# Patient Record
Sex: MALE | Race: WHITE | NOT HISPANIC OR LATINO | Employment: OTHER | URBAN - METROPOLITAN AREA
[De-identification: names, ages, dates, MRNs, and addresses within clinical notes are randomized per-mention and may not be internally consistent; named-entity substitution may affect disease eponyms.]

---

## 2018-05-04 ENCOUNTER — HOSPITAL ENCOUNTER (EMERGENCY)
Facility: CLINIC | Age: 73
Discharge: HOME | End: 2018-05-04
Attending: EMERGENCY MEDICINE | Admitting: EMERGENCY MEDICINE
Payer: MEDICARE

## 2018-05-04 VITALS
RESPIRATION RATE: 20 BRPM | TEMPERATURE: 97.7 F | BODY MASS INDEX: 24.38 KG/M2 | HEIGHT: 72 IN | OXYGEN SATURATION: 91 % | WEIGHT: 180 LBS | HEART RATE: 73 BPM | SYSTOLIC BLOOD PRESSURE: 100 MMHG | DIASTOLIC BLOOD PRESSURE: 57 MMHG

## 2018-05-04 DIAGNOSIS — R33.9 URINARY RETENTION: ICD-10-CM

## 2018-05-04 LAB
ALBUMIN UR-MCNC: NEGATIVE MG/DL
APPEARANCE UR: CLEAR
BILIRUB UR QL STRIP: NEGATIVE
COLOR UR AUTO: YELLOW
GLUCOSE UR STRIP-MCNC: NEGATIVE MG/DL
HGB UR QL STRIP: ABNORMAL
KETONES UR STRIP-MCNC: NEGATIVE MG/DL
LEUKOCYTE ESTERASE UR QL STRIP: NEGATIVE
MUCOUS THREADS #/AREA URNS LPF: PRESENT /LPF
NITRATE UR QL: NEGATIVE
PH UR STRIP: 6 PH (ref 5–7)
RBC #/AREA URNS AUTO: 52 /HPF (ref 0–2)
SOURCE: ABNORMAL
SP GR UR STRIP: 1.01 (ref 1–1.03)
SQUAMOUS #/AREA URNS AUTO: <1 /HPF (ref 0–1)
UROBILINOGEN UR STRIP-MCNC: 0 MG/DL (ref 0–2)
WBC #/AREA URNS AUTO: 3 /HPF (ref 0–5)

## 2018-05-04 PROCEDURE — 99284 EMERGENCY DEPT VISIT MOD MDM: CPT | Mod: 25

## 2018-05-04 PROCEDURE — 51702 INSERT TEMP BLADDER CATH: CPT

## 2018-05-04 PROCEDURE — 51798 US URINE CAPACITY MEASURE: CPT

## 2018-05-04 PROCEDURE — 81001 URINALYSIS AUTO W/SCOPE: CPT | Performed by: EMERGENCY MEDICINE

## 2018-05-04 RX ORDER — TAMSULOSIN HYDROCHLORIDE 0.4 MG/1
CAPSULE ORAL DAILY
COMMUNITY
End: 2018-07-30

## 2018-05-04 RX ORDER — MULTIVIT WITH MINERALS/LUTEIN
1 TABLET ORAL DAILY
COMMUNITY

## 2018-05-04 ASSESSMENT — ENCOUNTER SYMPTOMS
ABDOMINAL PAIN: 1
ABDOMINAL DISTENTION: 1
VOMITING: 1
DIFFICULTY URINATING: 1
DIARRHEA: 0

## 2018-05-04 NOTE — ED PROVIDER NOTES
History     Chief Complaint:  Urinary retention    HPI   Mahad Ward is a 72 year old male with a history of benign prostatic hypertrophy, taking tamsulosin, who presents with urinary retention. The patient reports that he began noticing some difficulty urinating two days ago despite drinking a large amount of fluids. Yesterday he notes that he was able to pass a few drops of urine, though he feels very distended. Yesterday he began having some severe abdominal cramping and then vomited. This vomiting did relieve his abdominal pain. He has continued to be unable to urinate. He denies having had any diarrhea.     Allergies:  Levaquin    Medications:    Combivent respimat  Dulera  Tamsulosin     Past Medical History:    BPH  COPD    Past Surgical History:    History reviewed. No pertinent past surgical history.     Family History:    The patient denies any relevant family medical history.     Social History:  The patient was accompanied to the ED by his wife.  Marital Status:   [2]     Review of Systems   Gastrointestinal: Positive for abdominal distention, abdominal pain and vomiting. Negative for diarrhea.   Genitourinary: Positive for difficulty urinating.   All other systems reviewed and are negative.    Physical Exam   Vitals:  Patient Vitals for the past 24 hrs:   BP Temp Temp src Pulse Heart Rate Resp SpO2 Height Weight   05/04/18 1222 100/57 97.7  F (36.5  C) Temporal 73 73 20 91 % 1.829 m (6') 81.6 kg (180 lb)        Physical Exam  Vital signs and nursing notes reviewed.     Constitutional: laying on gurney appears mildly uncomfortable  HENT: Oropharynx is clear and moist  Eyes: Conjunctivae are normal bilaterally. Pupils equal  Neck: normal range of motion  Cardiovascular: Normal rate, regular rhythm, normal heart sounds.   Pulmonary/Chest: Effort normal and breath sounds normal. No respiratory distress.   Abdominal: Soft. Bowel sounds are normal. Some suprapubic abdominal tenderness. Upper  abdomen is soft and non-distended. No flank tenderness.. No rebound or guarding.   Musculoskeletal: No joint swelling or edema.   Neurological: Alert and oriented. No focal weakness  Skin: Skin is warm and dry. No rash noted.   Psych: normal affect     Emergency Department Course     Laboratory:  Laboratory findings were communicated with the patient who voiced understanding of the findings.  UA: Blood: Moderate, RBC: 52(H), Mucous: present    Emergency Department Course:  Nursing notes and vitals reviewed.  I performed an exam of the patient as documented above.     1512 I rechecked with the patient    Findings and plan explained to the Patient. Patient discharged home with instructions regarding supportive care, medications, and reasons to return. The importance of close follow-up was reviewed.      I personally reviewed the laboratory results with the patient and answered all related questions prior to discharge.    Impression & Plan      Medical Decision Making:  Mahad Ward is a 72 year old male who presents to the emergency department today with urinary retention. The patient said his symptoms started approximately 36 hours ago.  On arrival he was not able to empty his bladder and had over 1000 cc's noted on bladder scan.  Gonzalez catheter was placed and the patient had significant improvment in symptomology. On recheck he was sleeping and resting comfortably.  He has no concerning flank pain or abdominal pain on examination. He has a history of prostatic hypertrophy which does put him at risk of urinary retention, but has no other concerning history that would suggest a cause for his urinary retention. His urinalysis does not show any evidence of a urinary tract infection. He does have a Urologist that he has seen in the past. I advised him to call the clinic for follow up in three days to have the Gonzalez catheter removed, or if he cant see his Urologist he can follow upright his primary physician. There is  no clear indication that he needs lab tests or imaging studies at this time. I felt he was safe for discharge home. Patient understands the plan and was discharged home.    Diagnosis:    ICD-10-CM    1. Urinary retention R33.9       Disposition:   Discharged    CMS Diagnoses: None     Scribe Disclosure:  I, Rodrigo RousseauEdwige, am serving as a scribe at 1:24 PM on 5/4/2018 to document services personally performed by Prakash Boo MD, based on my observations and the provider's statements to me.   [facility] EMERGENCY DEPARTMENT       Prakash Boo MD  05/04/18 8005

## 2018-05-04 NOTE — ED AVS SNAPSHOT
[facility] Emergency Department    [address]    [facility] [address]    Phone:  [phone]    Fax:  [phone]                                       Mahad Ward   MRN: [id number]    Department:  [facility] Emergency Department   Date of Visit:  5/4/2018           After Visit Summary Signature Page     I have received my discharge instructions, and my questions have been answered. I have discussed any challenges I see with this plan with the nurse or doctor.    ..........................................................................................................................................  Patient/Patient Representative Signature      ..........................................................................................................................................  Patient Representative Print Name and Relationship to Patient    ..................................................               ................................................  Date                                            Time    ..........................................................................................................................................  Reviewed by Signature/Title    ...................................................              ..............................................  Date                                                            Time

## 2018-05-04 NOTE — ED AVS SNAPSHOT
[facility] Emergency Department    [address]    [facility] [address]    Phone:  [phone]    Fax:  [phone]                                       Mahad Ward   MRN: [id number]    Department:  [facility] Emergency Department   Date of Visit:  5/4/2018           Patient Information     Date Of Birth          1945        Your diagnoses for this visit were:     Urinary retention        You were seen by Prakash Boo MD.      Follow-up Information     Follow up with Your Urologyist. Call today.    Why:  for appointment in 3 days, to remove barba catheter        Follow up with [facility] Emergency Department.    Specialty:  EMERGENCY MEDICINE    Why:  if severe pain, fever, uncontrolled vomiting    Contact information:    [address]  [facility] [id number] [phone]        Discharge Instructions           Barba Catheter Care    A Barba catheter is a rubber tube that is placed through the urethra (opening where urine comes out) and into the bladder. This helps drain urine from the bladder. There is a small balloon on the end of the tube that is inflated after insertion. This keeps the catheter from sliding out of the bladder.  A Barba catheter is used to treat urinary retention (unable to pass urine). It is also used when there is incontinence (loss of bladder control).  Home care    Finish taking any prescribed antibiotic even if you are feeling better before then.    It is important to keep bacteria from getting into the collection bag. Do not disconnect the catheter from the collection bag.    Use a leg band to secure the drainage tube, so it does not pull on the catheter. Drain the collection bag when it becomes full using the drain spout at the bottom of the bag.    Do not try to pull or remove your catheter. This will injure your urethra. It must be removed by your healthcare provider or nurse.  Follow-up care  Follow  up with your healthcare provider as advised for repeat urine testing and catheter removal or replacement.  When to seek medical advice  Call your healthcare provider right away if any of these occur:    Fever of 100.4 F (38 C) or higher, or as directed by your healthcare provider    Bladder pain or fullness    Abdominal swelling, nausea or vomiting, or back pain    Blood or urine leakage around the catheter    Bloody urine coming from the catheter (if a new symptom)    Catheter falls out    Catheter stops draining for 6 hours    Weakness, dizziness, or fainting  Date Last Reviewed: 10/1/2016    4965-3116 [employer]. [address] [place], [place] [id number]. All rights reserved. This information is not intended as a substitute for professional medical care. Always follow your healthcare professional's instructions.        Urinary Retention (Male)  Urinary retention is the medical term for difficulty or inability to pass urine, even though your bladder is full.  Causes  The most common cause of urinary retention in men is the bladder outlet being blocked. This can be due to an enlarged prostate gland or a bladder infection. Certain medicines can also cause this problem. This condition is more likely to occur as men get older.    Symptoms  Common symptoms of urinary retention include:    Pain (not experienced by everyone)    Frequent urination    Feeling that the bladder is still full after urinating    Incontinence (not being able to control the release of urine)    Swollen abdomen  Treatment  This condition is treated by inserting a tube (catheter) into the bladder to drain the urine. This provides immediate relief. The catheter may need to stay in place for a few days. The catheter has a balloon on the tip, which is inflated after insertion. This prevents the catheter from falling out.  Home care    If you were given antibiotics, take them until they are used up, or your healthcare provider tells you  to stop. It is important to finish the antibiotics even though you feel better. This is to make sure your infection has cleared.    If a catheter was left in place, it is important to keep bacteria from getting into the collection bag. Don't disconnect the catheter from the collection bag.    Use a leg band to secure the drainage tube, so it does not pull on the catheter. Drain the collection bag when it becomes full using the drain spout at the bottom of the bag.    Don't pull on or try to remove your catheter. This will injure your urethra. The catheter must be removed by a healthcare provider.  Follow-up care  Follow up with your healthcare provider, or as advised.  If a catheter was left in place, it can usually be removed within 3 to 7 days. Some conditions require the catheter to stay in longer. Your healthcare provider will tell you when to return to have the catheter removed.  When to seek medical advice  Call your healthcare provider right away if any of these occur:    Fever of 100.4 F (38 C) or higher, or as directed by your healthcare provider    Bladder or lower-abdominal pain or fullness    Abdominal swelling, nausea, vomiting, or back pain    Blood or urine leakage around the catheter    Bloody urine coming from the catheter (if a new symptom)    Weakness, dizziness, or fainting    Confusion or change in usual level of alertness    If a catheter was left in place, return if:  ? Catheter falls out  ? Catheter stops draining for 6 hours  Date Last Reviewed: 10/1/2017    6482-5499 [employer] [facility]. [address] [place] [address]. All rights reserved. This information is not intended as a substitute for professional medical care. Always follow your healthcare professional's instructions.          24 Hour Appointment Hotline       To make an appointment at any [facility], call [phone] ([phone]). If you don't have a family doctor or clinic, we will help you find one.  [facility] are conveniently located to serve the needs of you and your family.             Review of your medicines      Our records show that you are taking the medicines listed below. If these are incorrect, please call your family doctor or clinic.        Dose / Directions Last dose taken    ALEVE PO        Refills:  0        CENTRUM SILVER per tablet   Dose:  1 tablet        Take 1 tablet by mouth daily   Refills:  0        COMBIVENT RESPIMAT  MCG/ACT inhaler   Dose:  1 puff   Generic drug:  Ipratropium-Albuterol        Inhale 1 puff into the lungs 4 times daily   Refills:  0        FLOMAX 0.4 MG capsule   Generic drug:  tamsulosin        Take by mouth daily   Refills:  0        mometasone-formoterol 200-5 MCG/ACT oral inhaler   Commonly known as:  DULERA   Dose:  2 puff        Inhale 2 puffs into the lungs 2 times daily   Refills:  0        VITAMIN D (CHOLECALCIFEROL) PO        Take by mouth daily   Refills:  0                Procedures and tests performed during your visit     UA with Microscopic      Orders Needing Specimen Collection     None      Pending Results     No orders found from 5/2/2018 to 5/5/2018.            Pending Culture Results     No orders found from 5/2/2018 to 5/5/2018.            Pending Results Instructions     If you had any lab results that were not finalized at the time of your Discharge, you can call the ED Lab Result RN at [phone]. You will be contacted by this team for any positive Lab results or changes in treatment. The nurses are available 7 days a week from 10A to 6:30P.  You can leave a message 24 hours per day and they will return your call.        Test Results From Your Hospital Stay        5/4/2018  3:07 PM      Component Results     Component Value Ref Range & Units Status    Color Urine Yellow  Final    Appearance Urine Clear  Final    Glucose Urine Negative NEG^Negative mg/dL Final    Bilirubin Urine Negative NEG^Negative Final    Ketones Urine Negative  NEG^Negative mg/dL Final    Specific Gravity Urine 1.014 1.003 - 1.035 Final    Blood Urine Moderate (A) NEG^Negative Final    pH Urine 6.0 5.0 - 7.0 pH Final    Protein Albumin Urine Negative NEG^Negative mg/dL Final    Urobilinogen mg/dL 0.0 0.0 - 2.0 mg/dL Final    Nitrite Urine Negative NEG^Negative Final    Leukocyte Esterase Urine Negative NEG^Negative Final    Source Catheterized Urine  Final    WBC Urine 3 0 - 5 /HPF Final    RBC Urine 52 (H) 0 - 2 /HPF Final    Squamous Epithelial /HPF Urine <1 0 - 1 /HPF Final    Mucous Urine Present (A) NEG^Negative /LPF Final                Clinical Quality Measure: Blood Pressure Screening     Your blood pressure was checked while you were in the emergency department today. The last reading we obtained was  BP: 100/57 . Please read the guidelines below about what these numbers mean and what you should do about them.  If your systolic blood pressure (the top number) is less than 120 and your diastolic blood pressure (the bottom number) is less than 80, then your blood pressure is normal. There is nothing more that you need to do about it.  If your systolic blood pressure (the top number) is 120-139 or your diastolic blood pressure (the bottom number) is 80-89, your blood pressure may be higher than it should be. You should have your blood pressure rechecked within a year by a primary care provider.  If your systolic blood pressure (the top number) is 140 or greater or your diastolic blood pressure (the bottom number) is 90 or greater, you may have high blood pressure. High blood pressure is treatable, but if left untreated over time it can put you at risk for heart attack, stroke, or kidney failure. You should have your blood pressure rechecked by a primary care provider within the next 4 weeks.  If your provider in the emergency department today gave you specific instructions to follow-up with your doctor or provider even sooner than that, you should follow that  "instruction and not wait for up to 4 weeks for your follow-up visit.        Thank you for choosing [facility]       Thank you for choosing [facility] for your care. Our goal is always to provide you with excellent care. Hearing back from our patients is one way we can continue to improve our services. Please take a few minutes to complete the written survey that you may receive in the mail after you visit with us. Thank you!        [employer] Information     [employer] lets you send messages to your doctor, view your test results, renew your prescriptions, schedule appointments and more. To sign up, go to [email or web address] . Click on \"Log in\" on the left side of the screen, which will take you to the Welcome page. Then click on \"Sign up Now\" on the right side of the page.     You will be asked to enter the access code listed below, as well as some personal information. Please follow the directions to create your username and password.     Your access code is: [email or web address]  Expires: 2018  3:24 PM     Your access code will  in 90 days. If you need help or a new code, please call your [facility] clinic or [phone].        Care EveryWhere ID     This is your Care EveryWhere ID. This could be used by other organizations to access your [facility] medical records  [id number]        Equal Access to Services     KAYLEEN FLORES : Hadii lesia Bruno, waaxda luqadaha, qaybta kaalmada aiyana, amanda robbins . So [facility] [phone].    ATENCIÓN: Si habla español, tiene a herbert disposición servicios gratuitos de asistencia lingüística. Llame al [phone].    We comply with applicable federal civil rights laws and [place] laws. We do not discriminate on the basis of race, color, national origin, age, disability, sex, sexual orientation, or gender identity.            After Visit Summary       This is your record. Keep this with you and show to your community pharmacist(s) and doctor(s) " at your next visit.

## 2018-05-04 NOTE — DISCHARGE INSTRUCTIONS
Gonzalez Catheter Care    A Gonzalez catheter is a rubber tube that is placed through the urethra (opening where urine comes out) and into the bladder. This helps drain urine from the bladder. There is a small balloon on the end of the tube that is inflated after insertion. This keeps the catheter from sliding out of the bladder.  A Gonzalez catheter is used to treat urinary retention (unable to pass urine). It is also used when there is incontinence (loss of bladder control).  Home care    Finish taking any prescribed antibiotic even if you are feeling better before then.    It is important to keep bacteria from getting into the collection bag. Do not disconnect the catheter from the collection bag.    Use a leg band to secure the drainage tube, so it does not pull on the catheter. Drain the collection bag when it becomes full using the drain spout at the bottom of the bag.    Do not try to pull or remove your catheter. This will injure your urethra. It must be removed by your healthcare provider or nurse.  Follow-up care  Follow up with your healthcare provider as advised for repeat urine testing and catheter removal or replacement.  When to seek medical advice  Call your healthcare provider right away if any of these occur:    Fever of 100.4 F (38 C) or higher, or as directed by your healthcare provider    Bladder pain or fullness    Abdominal swelling, nausea or vomiting, or back pain    Blood or urine leakage around the catheter    Bloody urine coming from the catheter (if a new symptom)    Catheter falls out    Catheter stops draining for 6 hours    Weakness, dizziness, or fainting  Date Last Reviewed: 10/1/2016    8007-6047 [employer]. [address] [place] [address], [place] [address]. All rights reserved. This information is not intended as a substitute for professional medical care. Always follow your healthcare professional's instructions.        Urinary Retention (Male)  Urinary retention is the medical  term for difficulty or inability to pass urine, even though your bladder is full.  Causes  The most common cause of urinary retention in men is the bladder outlet being blocked. This can be due to an enlarged prostate gland or a bladder infection. Certain medicines can also cause this problem. This condition is more likely to occur as men get older.    Symptoms  Common symptoms of urinary retention include:    Pain (not experienced by everyone)    Frequent urination    Feeling that the bladder is still full after urinating    Incontinence (not being able to control the release of urine)    Swollen abdomen  Treatment  This condition is treated by inserting a tube (catheter) into the bladder to drain the urine. This provides immediate relief. The catheter may need to stay in place for a few days. The catheter has a balloon on the tip, which is inflated after insertion. This prevents the catheter from falling out.  Home care    If you were given antibiotics, take them until they are used up, or your healthcare provider tells you to stop. It is important to finish the antibiotics even though you feel better. This is to make sure your infection has cleared.    If a catheter was left in place, it is important to keep bacteria from getting into the collection bag. Don't disconnect the catheter from the collection bag.    Use a leg band to secure the drainage tube, so it does not pull on the catheter. Drain the collection bag when it becomes full using the drain spout at the bottom of the bag.    Don't pull on or try to remove your catheter. This will injure your urethra. The catheter must be removed by a healthcare provider.  Follow-up care  Follow up with your healthcare provider, or as advised.  If a catheter was left in place, it can usually be removed within 3 to 7 days. Some conditions require the catheter to stay in longer. Your healthcare provider will tell you when to return to have the catheter removed.  When to  seek medical advice  Call your healthcare provider right away if any of these occur:    Fever of 100.4 F (38 C) or higher, or as directed by your healthcare provider    Bladder or lower-abdominal pain or fullness    Abdominal swelling, nausea, vomiting, or back pain    Blood or urine leakage around the catheter    Bloody urine coming from the catheter (if a new symptom)    Weakness, dizziness, or fainting    Confusion or change in usual level of alertness    If a catheter was left in place, return if:  ? Catheter falls out  ? Catheter stops draining for 6 hours  Date Last Reviewed: 10/1/2017    4273-6080 [employer]. [address] [place], [place] [id number]. All rights reserved. This information is not intended as a substitute for professional medical care. Always follow your healthcare professional's instructions.

## 2018-05-04 NOTE — ED TRIAGE NOTES
Reports unable to urinate more than a dribble at a time since Wednesday night; states hx of BPH.  Reports he is having spasms and cramping to suprapubic area that have been bad enough to make him vomit twice. ABCs intact.

## 2018-05-07 ENCOUNTER — HOSPITAL ENCOUNTER (EMERGENCY)
Facility: CLINIC | Age: 73
Discharge: HOME | End: 2018-05-08
Attending: EMERGENCY MEDICINE | Admitting: EMERGENCY MEDICINE
Payer: MEDICARE

## 2018-05-07 VITALS
TEMPERATURE: 97.7 F | HEART RATE: 76 BPM | DIASTOLIC BLOOD PRESSURE: 78 MMHG | SYSTOLIC BLOOD PRESSURE: 137 MMHG | OXYGEN SATURATION: 93 % | RESPIRATION RATE: 18 BRPM

## 2018-05-07 DIAGNOSIS — R33.9 URINARY RETENTION: ICD-10-CM

## 2018-05-07 PROCEDURE — 51798 US URINE CAPACITY MEASURE: CPT

## 2018-05-07 PROCEDURE — 51702 INSERT TEMP BLADDER CATH: CPT

## 2018-05-07 PROCEDURE — 81001 URINALYSIS AUTO W/SCOPE: CPT | Performed by: EMERGENCY MEDICINE

## 2018-05-07 PROCEDURE — 99284 EMERGENCY DEPT VISIT MOD MDM: CPT | Mod: 25

## 2018-05-07 ASSESSMENT — ENCOUNTER SYMPTOMS
BACK PAIN: 0
FEVER: 0
DIFFICULTY URINATING: 1
ABDOMINAL PAIN: 0

## 2018-05-07 NOTE — ED AVS SNAPSHOT
[facility] Emergency Department    [address]    [facility] [address]    Phone:  [phone]    Fax:  [phone]                                       Mahad Ward   MRN: [id number]    Department:  [facility] Emergency Department   Date of Visit:  5/7/2018           After Visit Summary Signature Page     I have received my discharge instructions, and my questions have been answered. I have discussed any challenges I see with this plan with the nurse or doctor.    ..........................................................................................................................................  Patient/Patient Representative Signature      ..........................................................................................................................................  Patient Representative Print Name and Relationship to Patient    ..................................................               ................................................  Date                                            Time    ..........................................................................................................................................  Reviewed by Signature/Title    ...................................................              ..............................................  Date                                                            Time

## 2018-05-07 NOTE — ED AVS SNAPSHOT
[facility] Emergency Department    [address]    [facility] [address]    Phone:  [phone]    Fax:  [phone]                                       Mahad Ward   MRN: [id number]    Department:  [facility] Emergency Department   Date of Visit:  5/7/2018           Patient Information     Date Of Birth          1945        Your diagnoses for this visit were:     None       You were seen by Ramesh Almonte MD.      Discharge References/Attachments     [facility]OPPER CATHETER, CARE (ENGLISH)      24 Hour Appointment Hotline       To make an appointment at any [facility], call [phone] ([phone]). If you don't have a family doctor or clinic, we will help you find one. [facility] clinics are conveniently located to serve the needs of you and your family.             Review of your medicines      Our records show that you are taking the medicines listed below. If these are incorrect, please call your family doctor or clinic.        Dose / Directions Last dose taken    ALEVE PO        Refills:  0        CENTRUM SILVER per tablet   Dose:  1 tablet        Take 1 tablet by mouth daily   Refills:  0        COMBIVENT RESPIMAT  MCG/ACT inhaler   Dose:  1 puff   Generic drug:  Ipratropium-Albuterol        Inhale 1 puff into the lungs 4 times daily   Refills:  0        FLOMAX 0.4 MG capsule   Generic drug:  tamsulosin        Take by mouth daily   Refills:  0        mometasone-formoterol 200-5 MCG/ACT oral inhaler   Commonly known as:  DULERA   Dose:  2 puff        Inhale 2 puffs into the lungs 2 times daily   Refills:  0        VITAMIN D (CHOLECALCIFEROL) PO        Take by mouth daily   Refills:  0                Procedures and tests performed during your visit     UA with Microscopic      Orders Needing Specimen Collection     None      Pending Results     No orders found for last 3 day(s).            Pending Culture Results     No orders found for last 3  day(s).            Pending Results Instructions     If you had any lab results that were not finalized at the time of your Discharge, you can call the ED Lab Result RN at [phone]. You will be contacted by this team for any positive Lab results or changes in treatment. The nurses are available 7 days a week from 10A to 6:30P.  You can leave a message 24 hours per day and they will return your call.        Test Results From Your Hospital Stay        5/8/2018 12:14 AM      Component Results     Component Value Ref Range & Units Status    Color Urine Yellow  Final    Appearance Urine Clear  Final    Glucose Urine Negative NEG^Negative mg/dL Final    Bilirubin Urine Negative NEG^Negative Final    Ketones Urine Negative NEG^Negative mg/dL Final    Specific Gravity Urine 1.015 1.003 - 1.035 Final    Blood Urine Moderate (A) NEG^Negative Final    pH Urine 6.0 5.0 - 7.0 pH Final    Protein Albumin Urine 30 (A) NEG^Negative mg/dL Final    Urobilinogen mg/dL 2.0 0.0 - 2.0 mg/dL Final    Nitrite Urine Negative NEG^Negative Final    Leukocyte Esterase Urine Trace (A) NEG^Negative Final    Source Catheterized Urine  Final    WBC Urine 5 0 - 5 /HPF Final    RBC Urine 53 (H) 0 - 2 /HPF Final    Mucous Urine Present (A) NEG^Negative /LPF Final                Clinical Quality Measure: Blood Pressure Screening     Your blood pressure was checked while you were in the emergency department today. The last reading we obtained was  BP: 137/78 . Please read the guidelines below about what these numbers mean and what you should do about them.  If your systolic blood pressure (the top number) is less than 120 and your diastolic blood pressure (the bottom number) is less than 80, then your blood pressure is normal. There is nothing more that you need to do about it.  If your systolic blood pressure (the top number) is 120-139 or your diastolic blood pressure (the bottom number) is 80-89, your blood pressure may be higher than it should  "be. You should have your blood pressure rechecked within a year by a primary care provider.  If your systolic blood pressure (the top number) is 140 or greater or your diastolic blood pressure (the bottom number) is 90 or greater, you may have high blood pressure. High blood pressure is treatable, but if left untreated over time it can put you at risk for heart attack, stroke, or kidney failure. You should have your blood pressure rechecked by a primary care provider within the next 4 weeks.  If your provider in the emergency department today gave you specific instructions to follow-up with your doctor or provider even sooner than that, you should follow that instruction and not wait for up to 4 weeks for your follow-up visit.        Thank you for choosing [facility]       Thank you for choosing [facility] for your care. Our goal is always to provide you with excellent care. Hearing back from our patients is one way we can continue to improve our services. Please take a few minutes to complete the written survey that you may receive in the mail after you visit with us. Thank you!        [facility] Information     [facility] lets you send messages to your doctor, view your test results, renew your prescriptions, schedule appointments and more. To sign up, go to [email or web address] . Click on \"Log in\" on the left side of the screen, which will take you to the Welcome page. Then click on \"Sign up Now\" on the right side of the page.     You will be asked to enter the access code listed below, as well as some personal information. Please follow the directions to create your username and password.     Your access code is: [email or web address]  Expires: 2018  3:24 PM     Your access code will  in 90 days. If you need help or a new code, please call your [facility] clinic or [phone].        Care EveryWhere ID     This is your Care EveryWhere ID. This could be used by other organizations to access your [facility] medical " records  [id number]        Equal Access to Services     KAYLEEN FLORES : Juan Bruno, sarah ulrich, amanda aldrich. So [facility] [phone].    ATENCIÓN: Si habla español, tiene a herbert disposición servicios gratuitos de asistencia lingüística. Llame al [phone].    We comply with applicable federal civil rights laws and [place] laws. We do not discriminate on the basis of race, color, national origin, age, disability, sex, sexual orientation, or gender identity.            After Visit Summary       This is your record. Keep this with you and show to your community pharmacist(s) and doctor(s) at your next visit.

## 2018-05-08 LAB
ALBUMIN UR-MCNC: 30 MG/DL
APPEARANCE UR: CLEAR
BILIRUB UR QL STRIP: NEGATIVE
COLOR UR AUTO: YELLOW
GLUCOSE UR STRIP-MCNC: NEGATIVE MG/DL
HGB UR QL STRIP: ABNORMAL
KETONES UR STRIP-MCNC: NEGATIVE MG/DL
LEUKOCYTE ESTERASE UR QL STRIP: ABNORMAL
MUCOUS THREADS #/AREA URNS LPF: PRESENT /LPF
NITRATE UR QL: NEGATIVE
PH UR STRIP: 6 PH (ref 5–7)
RBC #/AREA URNS AUTO: 53 /HPF (ref 0–2)
SOURCE: ABNORMAL
SP GR UR STRIP: 1.01 (ref 1–1.03)
UROBILINOGEN UR STRIP-MCNC: 2 MG/DL (ref 0–2)
WBC #/AREA URNS AUTO: 5 /HPF (ref 0–5)

## 2018-05-08 NOTE — ED TRIAGE NOTES
Just had cath out today at 3 pm and has not peed since then. Seen recently here and had cath in. Not waiting as long this time.    Pt A&O x 3, CMS x 3, ABCD's adequate in triage

## 2018-05-08 NOTE — DISCHARGE INSTRUCTIONS
Urinary Retention (Male)  Urinary retention is the medical term for difficulty or inability to pass urine, even though your bladder is full.  Causes  The most common cause of urinary retention in men is the bladder outlet being blocked. This can be due to an enlarged prostate gland or a bladder infection. Certain medicines can also cause this problem. This condition is more likely to occur as men get older.    Symptoms  Common symptoms of urinary retention include:    Pain (not experienced by everyone)    Frequent urination    Feeling that the bladder is still full after urinating    Incontinence (not being able to control the release of urine)    Swollen abdomen  Treatment  This condition is treated by inserting a tube (catheter) into the bladder to drain the urine. This provides immediate relief. The catheter may need to stay in place for a few days. The catheter has a balloon on the tip, which is inflated after insertion. This prevents the catheter from falling out.  Home care    If you were given antibiotics, take them until they are used up, or your healthcare provider tells you to stop. It is important to finish the antibiotics even though you feel better. This is to make sure your infection has cleared.    If a catheter was left in place, it is important to keep bacteria from getting into the collection bag. Don't disconnect the catheter from the collection bag.    Use a leg band to secure the drainage tube, so it does not pull on the catheter. Drain the collection bag when it becomes full using the drain spout at the bottom of the bag.    Don't pull on or try to remove your catheter. This will injure your urethra. The catheter must be removed by a healthcare provider.  Follow-up care  Follow up with your healthcare provider, or as advised.  If a catheter was left in place, it can usually be removed within 3 to 7 days. Some conditions require the catheter to stay in longer. Your healthcare provider will  tell you when to return to have the catheter removed.  When to seek medical advice  Call your healthcare provider right away if any of these occur:    Fever of 100.4 F (38 C) or higher, or as directed by your healthcare provider    Bladder or lower-abdominal pain or fullness    Abdominal swelling, nausea, vomiting, or back pain    Blood or urine leakage around the catheter    Bloody urine coming from the catheter (if a new symptom)    Weakness, dizziness, or fainting    Confusion or change in usual level of alertness    If a catheter was left in place, return if:  ? Catheter falls out  ? Catheter stops draining for 6 hours  Date Last Reviewed: 10/1/2017    5914-0055 [employer]. [address] [place] [address], [place] [address]. All rights reserved. This information is not intended as a substitute for professional medical care. Always follow your healthcare professional's instructions.

## 2018-05-08 NOTE — ED PROVIDER NOTES
History     Chief Complaint:  Urinary retention    HPI   Mahad Ward is a 72 year old male who presents with urinary retention.  The patient says that he was seen in the ED on 5/4/18 for urinary rentention, and at this time a barba catheter was placed.  Today, the barba was removed at the patient's clinic but was told to return to the emergency department if urinary retention continues.  The patient says that after clinic today, his urinary retention did continue and he presents to the ED for further evaluation.  Here in the ED, the patient denies any abdominal pressure, abdominal pain, fever, or back pain.  Of note, the patient takes Flomax.  He has no back pain, leg pain, or other concerns.     Allergies:  Levaquin     Medications:    Combivent respimat  Dulera  Tamsulosin      Past Medical History:    BPH  COPD     Past Surgical History:    History reviewed. No pertinent past surgical history.      Family History:    The patient denies any relevant family medical history.      Social History:  The patient presents alone.  Marital Status:   [2]     Review of Systems   Constitutional: Negative for fever.   Gastrointestinal: Negative for abdominal pain.   Genitourinary: Positive for difficulty urinating.   Musculoskeletal: Negative for back pain.   All other systems reviewed and are negative.      Physical Exam     Patient Vitals for the past 24 hrs:   BP Temp Temp src Pulse Resp SpO2   05/07/18 2310 137/78 97.7  F (36.5  C) Temporal 76 18 93 %       Physical Exam  General:                        Well-nourished                        Speaking in full sentences  Eyes:                        Conjunctiva without injection or scleral icterus  ENT:                        Moist mucous membranes                        Nares patent                        Pinnae normal  Neck:                        Full ROM                        No stiffness appreciated  Resp:                        Lungs CTAB                         No crackles, wheezing or audible rubs                        Good air movement  CV:                                        Normal rate, regular rhythm                        S1 and S2 present                        No murmur, gallop or rub  GI:                        BS present                        Abdomen soft without distention                        Non-tender to light and deep palpation                        No palpable pulsatile mass                        No guarding or rebound tenderness  :                        Gonzalez catheter in place  Skin:                        Warm, dry, well perfused                        No rashes or open wounds on exposed skin  MSK:                        Moves all extremities                        No focal deformities or swelling  Neuro:                        Alert                        Answers questions appropriately                        Moves all extremities equally                        Gait stable  Psych:                        Normal affect, normal mood    Emergency Department Course     Laboratory:  UA with micro: Urine blood moderate, protein albumin urine 30, leukocyte esterase urine trace, RBCs/hpf 3 high, mucus urine present o/w negative    Emergency Department Course:  Nursing notes and vitals reviewed. 2331 I performed an exam of the patient as documented above.     The patient provided a urine sample here in the emergency department. This was sent for laboratory testing, findings above.     Findings and plan explained to the Patient. Patient discharged home with instructions regarding supportive care, medications, and reasons to return. The importance of close follow-up was reviewed.     I personally reviewed the laboratory results with the Patient and answered all related questions prior to discharge.     Impression & Plan      Medical Decision Making:  Patient is a 72-year-old male with a history of BPH presenting to the ER for evaluation of urinary  retention.  VS on presentation were mildly elevated BP though otherwise unremarkable.  Patient was seen and evaluated in the ED for a similar complaint 3 days prior with a diagnosis of urinary retention.  Gonzalez catheter was subsequently removed at 1500 today without associated urination since that time.  Symptoms again concerning for urinary retention likely secondary to BPH.  Bladder scanning demonstrates approximately 1 L of retained urine.  After Gonzalez catheter was placed, urine returned without difficulty.  Repeat urinanalysis does not reveal evidence of infection.  He notes no associated abdominal pain nor back pain to suggest pyelonephritis.  I feel that this is unlikely to represent acute cord compressive process or other neurologic emergency.  I recommended remaining with catheter in place until follow-up with urology.  He does have contact information already.  He is welcome to return to the ER with new or troubling symptoms such as recurrent obstruction, fever, vomiting, or any other concerns.  All questions answered prior to discharge.    Diagnosis:    ICD-10-CM    1. Urinary retention R33.9        Disposition:  discharged to home    Iglesia Rojas  5/7/2018   [facility] EMERGENCY DEPARTMENT  I, Iglesia Rojas, am serving as a scribe at 11:31 PM on 5/7/2018 to document services personally performed by Ramesh Almonte MD based on my observations and the provider's statements to me.        Ramesh Almonte MD  05/08/18 0122

## 2018-05-20 ENCOUNTER — HOSPITAL ENCOUNTER (EMERGENCY)
Facility: CLINIC | Age: 73
Discharge: HOME | End: 2018-05-20
Attending: EMERGENCY MEDICINE | Admitting: EMERGENCY MEDICINE
Payer: MEDICARE

## 2018-05-20 VITALS
TEMPERATURE: 97.3 F | RESPIRATION RATE: 18 BRPM | OXYGEN SATURATION: 92 % | SYSTOLIC BLOOD PRESSURE: 116 MMHG | DIASTOLIC BLOOD PRESSURE: 76 MMHG

## 2018-05-20 DIAGNOSIS — R33.9 URINARY RETENTION: ICD-10-CM

## 2018-05-20 LAB
ALBUMIN UR-MCNC: NEGATIVE MG/DL
ANION GAP SERPL CALCULATED.3IONS-SCNC: 4 MMOL/L (ref 3–14)
APPEARANCE UR: ABNORMAL
BACTERIA #/AREA URNS HPF: ABNORMAL /HPF
BILIRUB UR QL STRIP: NEGATIVE
BUN SERPL-MCNC: 17 MG/DL (ref 7–30)
CALCIUM SERPL-MCNC: 8.6 MG/DL (ref 8.5–10.1)
CHLORIDE SERPL-SCNC: 111 MMOL/L (ref 94–109)
CO2 SERPL-SCNC: 26 MMOL/L (ref 20–32)
COLOR UR AUTO: YELLOW
CREAT SERPL-MCNC: 0.94 MG/DL (ref 0.66–1.25)
GFR SERPL CREATININE-BSD FRML MDRD: 79 ML/MIN/1.7M2
GLUCOSE SERPL-MCNC: 107 MG/DL (ref 70–99)
GLUCOSE UR STRIP-MCNC: NEGATIVE MG/DL
HGB UR QL STRIP: ABNORMAL
KETONES UR STRIP-MCNC: NEGATIVE MG/DL
LEUKOCYTE ESTERASE UR QL STRIP: NEGATIVE
MUCOUS THREADS #/AREA URNS LPF: PRESENT /LPF
NITRATE UR QL: NEGATIVE
PH UR STRIP: 7 PH (ref 5–7)
POTASSIUM SERPL-SCNC: 4.1 MMOL/L (ref 3.4–5.3)
RBC #/AREA URNS AUTO: >182 /HPF (ref 0–2)
SODIUM SERPL-SCNC: 141 MMOL/L (ref 133–144)
SOURCE: ABNORMAL
SP GR UR STRIP: 1.01 (ref 1–1.03)
UROBILINOGEN UR STRIP-MCNC: 0 MG/DL (ref 0–2)
WBC #/AREA URNS AUTO: 2 /HPF (ref 0–5)

## 2018-05-20 PROCEDURE — 99283 EMERGENCY DEPT VISIT LOW MDM: CPT

## 2018-05-20 PROCEDURE — 81001 URINALYSIS AUTO W/SCOPE: CPT | Performed by: EMERGENCY MEDICINE

## 2018-05-20 PROCEDURE — 80048 BASIC METABOLIC PNL TOTAL CA: CPT | Performed by: EMERGENCY MEDICINE

## 2018-05-20 ASSESSMENT — ENCOUNTER SYMPTOMS
FEVER: 0
NAUSEA: 0
DIFFICULTY URINATING: 1
HEMATURIA: 1
ABDOMINAL PAIN: 0

## 2018-05-20 NOTE — ED TRIAGE NOTES
Pt has been self cathing for about 4 days.  States that the last time he was able to drain any urine was 1000 Saturday.  States since then he hasn't drained any urine and has only noticed some blood on the catheter.

## 2018-05-20 NOTE — ED NOTES
Did  Bladder scan  Greater than 999 scanned  Pt just learned to self cath   Has been doing since thurs  Has had no trouble since yesterday  Only few drops of blood since  About 10 am    Here for eval

## 2018-05-20 NOTE — ED NOTES
Patient reports that bladder pressure is relieved after catheter placement.  Labs have been drawn and sent.  Patient is resting and awaiting results.

## 2018-05-20 NOTE — ED PROVIDER NOTES
History     Chief Complaint:  Urinary Retention    HPI   Mahad Ward is a 72 year old male who presents with urinary retention. The patient states he has been experiencing issues with urinary retention and output for the past few weeks, he has seen urology for this over this time but no specific cause of this has been found, though there is possible mention of prostate enlargement being the culprit. Patient has been self-cathing himself for the past 4 days, but states he was last able to obtain urine via this method at 1000 yesterday morning. He notes some small drops of hematuria throughout these past 4 days as well. He denies any fevers or any other symptoms.    Allergies:  Levaquin [Levofloxacin]     Medications:    Ipratropium-Albuterol (COMBIVENT RESPIMAT)  MCG/ACT inhaler  mometasone-formoterol (DULERA) 200-5 MCG/ACT oral inhaler  Naproxen Sodium (ALEVE PO)  tamsulosin (FLOMAX) 0.4 MG capsule    Past Medical History:    BPH  COPD    Past Surgical History:    No pertinent past surgical history.    Family History:    No pertinent family history.    Social History:  Smoking status: Never smoker  Alcohol use: No  Marital Status:        Review of Systems   Constitutional: Negative for fever.   Gastrointestinal: Negative for abdominal pain and nausea.   Genitourinary: Positive for difficulty urinating and hematuria. Negative for decreased urine volume, penile pain and penile swelling.   All other systems reviewed and are negative.    Physical Exam     Patient Vitals for the past 24 hrs:   BP Temp Temp src Heart Rate Resp SpO2   05/20/18 0511 116/76 97.3  F (36.3  C) Temporal 87 18 92 %         Physical Exam  General: Patient is alert and interactive when I enter the room  Head:  The scalp, face, and head appear normal  Eyes:  Conjunctivae are normal  ENT:    The nose is normal    Pinnae are normal    External acoustic canals are normal  Neck:  Trachea midline  CV:  Pulses are normal  Resp:  No  respiratory distress   Abdomen:      Soft, non-tender, non-distended  Musc:  Normal muscular tone    No major joint effusions  Skin:  No rash or lesions noted  Neuro:  Speech is normal and fluent. Face is symmetric.     Moving all extremities well.   Psych: Awake. Alert.  Normal affect.  Appropriate interactions.      Emergency Department Course   Laboratory:  BMP: Chloride 111 (H), Glucose 107 (H), otherwise WNL (Creatinine 0.94)    UA: Slightly Cloudy yellow urine, Blood Large, RBC >182 (H), Bacteria Few, Mucous Present, otherwise WNL    Emergency Department Course:  Nursing notes and vitals reviewed.  (0523) I performed an exam of the patient as documented above.    Blood was drawn from the patient. This was sent for laboratory testing, findings above.   Urine sample was obtained and sent for laboratory analysis, findings above.    Findings and plan explained to the patient. Patient discharged home with instructions regarding supportive care, medications, and reasons to return. The importance of close follow-up was reviewed.  Impression & Plan    Medical Decision Making:  Mahad Ward is a 72 year old male who presents for evaluation of abdominal pain and decreased urinary output.  I considered a broad differential including diverticulitis, aneurysm, urinary retention, ureterolithiasis, UTI, pyelonephritis, neurologic causes (MS, cauda equina,etc), colitis, etc.  The history and exam are consistent with acute urinary retention and this is confirmed after barba catheter placement.  A urinalysis was obtained and was negative for infection.  Will send home with catheter and have patient followup with urology in 3-5 days.  The cause of the acute urinary retention is likely his BPH as he is due to have a TURP done. Creatinine normal.Patient is stable for discharge home.      Diagnosis:    ICD-10-CM    1. Urinary retention R33.9        Disposition:  Patient is discharged to home.      Discharge Medications:  New  Prescriptions    No medications on file       I, Nelson Davis, am serving as a scribe on 5/20/2018 at 5:23 AM to personally document services performed by Dr. Mckenzie based on my observations and the provider's statements to me.         Nelson Davis  5/20/2018   [facility] EMERGENCY DEPARTMENT       Rebecca Mckenzie MD  05/20/18 1930

## 2018-05-20 NOTE — ED AVS SNAPSHOT
[facility] Emergency Department    [address]    [facility] [address]    Phone:  [phone]    Fax:  [phone]                                       Mahad Ward   MRN: [id number]    Department:  [facility] Emergency Department   Date of Visit:  5/20/2018           After Visit Summary Signature Page     I have received my discharge instructions, and my questions have been answered. I have discussed any challenges I see with this plan with the nurse or doctor.    ..........................................................................................................................................  Patient/Patient Representative Signature      ..........................................................................................................................................  Patient Representative Print Name and Relationship to Patient    ..................................................               ................................................  Date                                            Time    ..........................................................................................................................................  Reviewed by Signature/Title    ...................................................              ..............................................  Date                                                            Time

## 2018-05-20 NOTE — DISCHARGE INSTRUCTIONS
Urinary Retention (Male)  Urinary retention is the medical term for difficulty or inability to pass urine, even though your bladder is full.  Causes  The most common cause of urinary retention in men is the bladder outlet being blocked. This can be due to an enlarged prostate gland or a bladder infection. Certain medicines can also cause this problem. This condition is more likely to occur as men get older.    Symptoms  Common symptoms of urinary retention include:    Pain (not experienced by everyone)    Frequent urination    Feeling that the bladder is still full after urinating    Incontinence (not being able to control the release of urine)    Swollen abdomen  Treatment  This condition is treated by inserting a tube (catheter) into the bladder to drain the urine. This provides immediate relief. The catheter may need to stay in place for a few days. The catheter has a balloon on the tip, which is inflated after insertion. This prevents the catheter from falling out.  Home care    If you were given antibiotics, take them until they are used up, or your healthcare provider tells you to stop. It is important to finish the antibiotics even though you feel better. This is to make sure your infection has cleared.    If a catheter was left in place, it is important to keep bacteria from getting into the collection bag. Don't disconnect the catheter from the collection bag.    Use a leg band to secure the drainage tube, so it does not pull on the catheter. Drain the collection bag when it becomes full using the drain spout at the bottom of the bag.    Don't pull on or try to remove your catheter. This will injure your urethra. The catheter must be removed by a healthcare provider.  Follow-up care  Follow up with your healthcare provider, or as advised.  If a catheter was left in place, it can usually be removed within 3 to 7 days. Some conditions require the catheter to stay in longer. Your healthcare provider will  tell you when to return to have the catheter removed.  When to seek medical advice  Call your healthcare provider right away if any of these occur:    Fever of 100.4 F (38 C) or higher, or as directed by your healthcare provider    Bladder or lower-abdominal pain or fullness    Abdominal swelling, nausea, vomiting, or back pain    Blood or urine leakage around the catheter    Bloody urine coming from the catheter (if a new symptom)    Weakness, dizziness, or fainting    Confusion or change in usual level of alertness    If a catheter was left in place, return if:  ? Catheter falls out  ? Catheter stops draining for 6 hours  Date Last Reviewed: 10/1/2017    0716-3613 [employer]. [address] [place] [address], [place] [address]. All rights reserved. This information is not intended as a substitute for professional medical care. Always follow your healthcare professional's instructions.          Gonzalez Catheter Care    A Gonzalez catheter is a rubber tube that is placed through the urethra (opening where urine comes out) and into the bladder. This helps drain urine from the bladder. There is a small balloon on the end of the tube that is inflated after insertion. This keeps the catheter from sliding out of the bladder.  A Gonzalez catheter is used to treat urinary retention (unable to pass urine). It is also used when there is incontinence (loss of bladder control).  Home care    Finish taking any prescribed antibiotic even if you are feeling better before then.    It is important to keep bacteria from getting into the collection bag. Do not disconnect the catheter from the collection bag.    Use a leg band to secure the drainage tube, so it does not pull on the catheter. Drain the collection bag when it becomes full using the drain spout at the bottom of the bag.    Do not try to pull or remove your catheter. This will injure your urethra. It must be removed by your healthcare provider or nurse.  Follow-up  care  Follow up with your healthcare provider as advised for repeat urine testing and catheter removal or replacement.  When to seek medical advice  Call your healthcare provider right away if any of these occur:    Fever of 100.4 F (38 C) or higher, or as directed by your healthcare provider    Bladder pain or fullness    Abdominal swelling, nausea or vomiting, or back pain    Blood or urine leakage around the catheter    Bloody urine coming from the catheter (if a new symptom)    Catheter falls out    Catheter stops draining for 6 hours    Weakness, dizziness, or fainting  Date Last Reviewed: 10/1/2016    1581-9065 [employer]. [address] [place] [address]. All rights reserved. This information is not intended as a substitute for professional medical care. Always follow your healthcare professional's instructions.

## 2018-05-20 NOTE — ED AVS SNAPSHOT
[facility] Emergency Department    [address] [place] [address]    Phone:  [phone]    Fax:  [phone]                                       Mahad Ward   MRN: [id number]    Department:  [facility] Emergency Department   Date of Visit:  5/20/2018           Patient Information     Date Of Birth          1945        Your diagnoses for this visit were:     Urinary retention        You were seen by Rebecca Mckenzie MD.      Follow-up Information     Follow up with [facility] In 2 days.    Contact information:    [address] [place] [address]  [place] [address]  [phone]          Discharge Instructions         Urinary Retention (Male)  Urinary retention is the medical term for difficulty or inability to pass urine, even though your bladder is full.  Causes  The most common cause of urinary retention in men is the bladder outlet being blocked. This can be due to an enlarged prostate gland or a bladder infection. Certain medicines can also cause this problem. This condition is more likely to occur as men get older.    Symptoms  Common symptoms of urinary retention include:    Pain (not experienced by everyone)    Frequent urination    Feeling that the bladder is still full after urinating    Incontinence (not being able to control the release of urine)    Swollen abdomen  Treatment  This condition is treated by inserting a tube (catheter) into the bladder to drain the urine. This provides immediate relief. The catheter may need to stay in place for a few days. The catheter has a balloon on the tip, which is inflated after insertion. This prevents the catheter from falling out.  Home care    If you were given antibiotics, take them until they are used up, or your healthcare provider tells you to stop. It is important to finish the antibiotics even though you feel better. This is to make sure your infection has cleared.    If a catheter was left in place,  it is important to keep bacteria from getting into the collection bag. Don't disconnect the catheter from the collection bag.    Use a leg band to secure the drainage tube, so it does not pull on the catheter. Drain the collection bag when it becomes full using the drain spout at the bottom of the bag.    Don't pull on or try to remove your catheter. This will injure your urethra. The catheter must be removed by a healthcare provider.  Follow-up care  Follow up with your healthcare provider, or as advised.  If a catheter was left in place, it can usually be removed within 3 to 7 days. Some conditions require the catheter to stay in longer. Your healthcare provider will tell you when to return to have the catheter removed.  When to seek medical advice  Call your healthcare provider right away if any of these occur:    Fever of 100.4 F (38 C) or higher, or as directed by your healthcare provider    Bladder or lower-abdominal pain or fullness    Abdominal swelling, nausea, vomiting, or back pain    Blood or urine leakage around the catheter    Bloody urine coming from the catheter (if a new symptom)    Weakness, dizziness, or fainting    Confusion or change in usual level of alertness    If a catheter was left in place, return if:  ? Catheter falls out  ? Catheter stops draining for 6 hours  Date Last Reviewed: 10/1/2017    0370-7015 [employer]. [address] [place] [address]. All rights reserved. This information is not intended as a substitute for professional medical care. Always follow your healthcare professional's instructions.          Gonzalez Catheter Care    A Gonzalez catheter is a rubber tube that is placed through the urethra (opening where urine comes out) and into the bladder. This helps drain urine from the bladder. There is a small balloon on the end of the tube that is inflated after insertion. This keeps the catheter from sliding out of the bladder.  A Gonzalez catheter is used to  treat urinary retention (unable to pass urine). It is also used when there is incontinence (loss of bladder control).  Home care    Finish taking any prescribed antibiotic even if you are feeling better before then.    It is important to keep bacteria from getting into the collection bag. Do not disconnect the catheter from the collection bag.    Use a leg band to secure the drainage tube, so it does not pull on the catheter. Drain the collection bag when it becomes full using the drain spout at the bottom of the bag.    Do not try to pull or remove your catheter. This will injure your urethra. It must be removed by your healthcare provider or nurse.  Follow-up care  Follow up with your healthcare provider as advised for repeat urine testing and catheter removal or replacement.  When to seek medical advice  Call your healthcare provider right away if any of these occur:    Fever of 100.4 F (38 C) or higher, or as directed by your healthcare provider    Bladder pain or fullness    Abdominal swelling, nausea or vomiting, or back pain    Blood or urine leakage around the catheter    Bloody urine coming from the catheter (if a new symptom)    Catheter falls out    Catheter stops draining for 6 hours    Weakness, dizziness, or fainting  Date Last Reviewed: 10/1/2016    5435-6242 [employer]. [address] [place] [address]. All rights reserved. This information is not intended as a substitute for professional medical care. Always follow your healthcare professional's instructions.          24 Hour Appointment Hotline       To make an appointment at any [facility], call [phone] ([phone]). If you don't have a family doctor or clinic, we will help you find one. [facility] are conveniently located to serve the needs of you and your family.             Review of your medicines      Our records show that you are taking the medicines listed below. If these are incorrect, please  call your family doctor or clinic.        Dose / Directions Last dose taken    ALEVE PO        Refills:  0        CENTRUM SILVER per tablet   Dose:  1 tablet        Take 1 tablet by mouth daily   Refills:  0        COMBIVENT RESPIMAT  MCG/ACT inhaler   Dose:  1 puff   Generic drug:  Ipratropium-Albuterol        Inhale 1 puff into the lungs 4 times daily   Refills:  0        FLOMAX 0.4 MG capsule   Generic drug:  tamsulosin        Take by mouth daily   Refills:  0        mometasone-formoterol 200-5 MCG/ACT oral inhaler   Commonly known as:  DULERA   Dose:  2 puff        Inhale 2 puffs into the lungs 2 times daily   Refills:  0        VITAMIN D (CHOLECALCIFEROL) PO        Take by mouth daily   Refills:  0                Procedures and tests performed during your visit     Basic metabolic panel    UA with Microscopic reflex to Culture      Orders Needing Specimen Collection     None      Pending Results     No orders found from 5/18/2018 to 5/21/2018.            Pending Culture Results     No orders found from 5/18/2018 to 5/21/2018.            Pending Results Instructions     If you had any lab results that were not finalized at the time of your Discharge, you can call the ED Lab Result RN at [phone]. You will be contacted by this team for any positive Lab results or changes in treatment. The nurses are available 7 days a week from 10A to 6:30P.  You can leave a message 24 hours per day and they will return your call.        Test Results From Your Hospital Stay        5/20/2018  6:05 AM      Component Results     Component Value Ref Range & Units Status    Sodium 141 133 - 144 mmol/L Final    Potassium 4.1 3.4 - 5.3 mmol/L Final    Chloride 111 (H) 94 - 109 mmol/L Final    Carbon Dioxide 26 20 - 32 mmol/L Final    Anion Gap 4 3 - 14 mmol/L Final    Glucose 107 (H) 70 - 99 mg/dL Final    Urea Nitrogen 17 7 - 30 mg/dL Final    Creatinine 0.94 0.66 - 1.25 mg/dL Final    GFR Estimate 79 >60 mL/min/1.7m2 Final     Non  GFR Calc    GFR Estimate If Black >90 >60 mL/min/1.7m2 Final    African American GFR Calc    Calcium 8.6 8.5 - 10.1 mg/dL Final         5/20/2018  6:26 AM      Component Results     Component Value Ref Range & Units Status    Color Urine Yellow  Final    Appearance Urine Slightly Cloudy  Final    Glucose Urine Negative NEG^Negative mg/dL Final    Bilirubin Urine Negative NEG^Negative Final    Ketones Urine Negative NEG^Negative mg/dL Final    Specific Gravity Urine 1.013 1.003 - 1.035 Final    Blood Urine Large (A) NEG^Negative Final    pH Urine 7.0 5.0 - 7.0 pH Final    Protein Albumin Urine Negative NEG^Negative mg/dL Final    Urobilinogen mg/dL 0.0 0.0 - 2.0 mg/dL Final    Nitrite Urine Negative NEG^Negative Final    Leukocyte Esterase Urine Negative NEG^Negative Final    Source Catheterized Urine  Final    WBC Urine 2 0 - 5 /HPF Final    RBC Urine >182 (H) 0 - 2 /HPF Final    Reviewed, acceptable    Bacteria Urine Few (A) NEG^Negative /HPF Final    Mucous Urine Present (A) NEG^Negative /LPF Final                Clinical Quality Measure: Blood Pressure Screening     Your blood pressure was checked while you were in the emergency department today. The last reading we obtained was  BP: 116/76 . Please read the guidelines below about what these numbers mean and what you should do about them.  If your systolic blood pressure (the top number) is less than 120 and your diastolic blood pressure (the bottom number) is less than 80, then your blood pressure is normal. There is nothing more that you need to do about it.  If your systolic blood pressure (the top number) is 120-139 or your diastolic blood pressure (the bottom number) is 80-89, your blood pressure may be higher than it should be. You should have your blood pressure rechecked within a year by a primary care provider.  If your systolic blood pressure (the top number) is 140 or greater or your diastolic blood pressure (the bottom number)  "is 90 or greater, you may have high blood pressure. High blood pressure is treatable, but if left untreated over time it can put you at risk for heart attack, stroke, or kidney failure. You should have your blood pressure rechecked by a primary care provider within the next 4 weeks.  If your provider in the emergency department today gave you specific instructions to follow-up with your doctor or provider even sooner than that, you should follow that instruction and not wait for up to 4 weeks for your follow-up visit.        Thank you for choosing [facility]       Thank you for choosing [facility] for your care. Our goal is always to provide you with excellent care. Hearing back from our patients is one way we can continue to improve our services. Please take a few minutes to complete the written survey that you may receive in the mail after you visit with us. Thank you!        [employer] Information     [employer] lets you send messages to your doctor, view your test results, renew your prescriptions, schedule appointments and more. To sign up, go to [email or web address] . Click on \"Log in\" on the left side of the screen, which will take you to the Welcome page. Then click on \"Sign up Now\" on the right side of the page.     You will be asked to enter the access code listed below, as well as some personal information. Please follow the directions to create your username and password.     Your access code is: [email or web address]  Expires: 2018  3:24 PM     Your access code will  in 90 days. If you need help or a new code, please call your [facility] clinic or [phone].        Care EveryWhere ID     This is your Care EveryWhere ID. This could be used by other organizations to access your [facility] medical records  [id number]        Equal Access to Services     KAYLEEN FLORES : sarah Tomas qaybta kaalmada adeegyada, waxay idiin hayaan adeeg kharash la'aan ah. So waananth " [phone].    ATENCIÓN: Si habla español, tiene a herbert disposición servicios gratuitos de asistencia lingüística. Llame al [phone].    We comply with applicable federal civil rights laws and [place] laws. We do not discriminate on the basis of race, color, national origin, age, disability, sex, sexual orientation, or gender identity.            After Visit Summary       This is your record. Keep this with you and show to your community pharmacist(s) and doctor(s) at your next visit.

## 2018-06-02 ENCOUNTER — HOSPITAL ENCOUNTER (EMERGENCY)
Facility: CLINIC | Age: 73
Discharge: HOME | End: 2018-06-02
Attending: EMERGENCY MEDICINE | Admitting: EMERGENCY MEDICINE
Payer: MEDICARE

## 2018-06-02 VITALS
HEART RATE: 87 BPM | DIASTOLIC BLOOD PRESSURE: 67 MMHG | TEMPERATURE: 97.8 F | SYSTOLIC BLOOD PRESSURE: 112 MMHG | RESPIRATION RATE: 16 BRPM | OXYGEN SATURATION: 100 %

## 2018-06-02 DIAGNOSIS — N39.0 ACUTE UTI: ICD-10-CM

## 2018-06-02 DIAGNOSIS — T83.091A OBSTRUCTION OF FOLEY CATHETER, INITIAL ENCOUNTER (H): ICD-10-CM

## 2018-06-02 DIAGNOSIS — R10.9 ABDOMINAL CRAMPING: ICD-10-CM

## 2018-06-02 LAB
ALBUMIN UR-MCNC: NEGATIVE MG/DL
ANION GAP SERPL CALCULATED.3IONS-SCNC: 4 MMOL/L (ref 3–14)
APPEARANCE UR: CLEAR
BACTERIA #/AREA URNS HPF: ABNORMAL /HPF
BACTERIA SPEC CULT: NORMAL
BASOPHILS # BLD AUTO: 0 10E9/L (ref 0–0.2)
BASOPHILS NFR BLD AUTO: 0.6 %
BILIRUB UR QL STRIP: NEGATIVE
BUN SERPL-MCNC: 17 MG/DL (ref 7–30)
CALCIUM SERPL-MCNC: 8.7 MG/DL (ref 8.5–10.1)
CHLORIDE SERPL-SCNC: 108 MMOL/L (ref 94–109)
CO2 SERPL-SCNC: 29 MMOL/L (ref 20–32)
COLOR UR AUTO: YELLOW
CREAT SERPL-MCNC: 0.82 MG/DL (ref 0.66–1.25)
DIFFERENTIAL METHOD BLD: NORMAL
EOSINOPHIL # BLD AUTO: 0.3 10E9/L (ref 0–0.7)
EOSINOPHIL NFR BLD AUTO: 5.2 %
ERYTHROCYTE [DISTWIDTH] IN BLOOD BY AUTOMATED COUNT: 13.4 % (ref 10–15)
GFR SERPL CREATININE-BSD FRML MDRD: >90 ML/MIN/1.7M2
GLUCOSE SERPL-MCNC: 115 MG/DL (ref 70–99)
GLUCOSE UR STRIP-MCNC: NEGATIVE MG/DL
HCT VFR BLD AUTO: 42.4 % (ref 40–53)
HGB BLD-MCNC: 13.7 G/DL (ref 13.3–17.7)
HGB UR QL STRIP: ABNORMAL
IMM GRANULOCYTES # BLD: 0 10E9/L (ref 0–0.4)
IMM GRANULOCYTES NFR BLD: 0.4 %
KETONES UR STRIP-MCNC: NEGATIVE MG/DL
LEUKOCYTE ESTERASE UR QL STRIP: ABNORMAL
LYMPHOCYTES # BLD AUTO: 1.3 10E9/L (ref 0.8–5.3)
LYMPHOCYTES NFR BLD AUTO: 25.1 %
MCH RBC QN AUTO: 30 PG (ref 26.5–33)
MCHC RBC AUTO-ENTMCNC: 32.3 G/DL (ref 31.5–36.5)
MCV RBC AUTO: 93 FL (ref 78–100)
MONOCYTES # BLD AUTO: 0.4 10E9/L (ref 0–1.3)
MONOCYTES NFR BLD AUTO: 8.3 %
MUCOUS THREADS #/AREA URNS LPF: PRESENT /LPF
NEUTROPHILS # BLD AUTO: 3.1 10E9/L (ref 1.6–8.3)
NEUTROPHILS NFR BLD AUTO: 60.4 %
NITRATE UR QL: POSITIVE
NRBC # BLD AUTO: 0 10*3/UL
NRBC BLD AUTO-RTO: 0 /100
PH UR STRIP: 7 PH (ref 5–7)
PLATELET # BLD AUTO: 251 10E9/L (ref 150–450)
POTASSIUM SERPL-SCNC: 3.9 MMOL/L (ref 3.4–5.3)
RBC # BLD AUTO: 4.57 10E12/L (ref 4.4–5.9)
RBC #/AREA URNS AUTO: 9 /HPF (ref 0–2)
SODIUM SERPL-SCNC: 141 MMOL/L (ref 133–144)
SOURCE: ABNORMAL
SP GR UR STRIP: 1.01 (ref 1–1.03)
SPECIMEN SOURCE: NORMAL
SQUAMOUS #/AREA URNS AUTO: <1 /HPF (ref 0–1)
UROBILINOGEN UR STRIP-MCNC: 0 MG/DL (ref 0–2)
WBC # BLD AUTO: 5.2 10E9/L (ref 4–11)
WBC #/AREA URNS AUTO: 15 /HPF (ref 0–5)

## 2018-06-02 PROCEDURE — 80048 BASIC METABOLIC PNL TOTAL CA: CPT | Performed by: EMERGENCY MEDICINE

## 2018-06-02 PROCEDURE — 51702 INSERT TEMP BLADDER CATH: CPT

## 2018-06-02 PROCEDURE — 87186 SC STD MICRODIL/AGAR DIL: CPT | Performed by: EMERGENCY MEDICINE

## 2018-06-02 PROCEDURE — 81001 URINALYSIS AUTO W/SCOPE: CPT | Performed by: EMERGENCY MEDICINE

## 2018-06-02 PROCEDURE — 99283 EMERGENCY DEPT VISIT LOW MDM: CPT

## 2018-06-02 PROCEDURE — 85025 COMPLETE CBC W/AUTO DIFF WBC: CPT | Performed by: EMERGENCY MEDICINE

## 2018-06-02 PROCEDURE — 87088 URINE BACTERIA CULTURE: CPT | Performed by: EMERGENCY MEDICINE

## 2018-06-02 PROCEDURE — 87086 URINE CULTURE/COLONY COUNT: CPT | Performed by: EMERGENCY MEDICINE

## 2018-06-02 RX ORDER — NITROFURANTOIN 25; 75 MG/1; MG/1
100 CAPSULE ORAL 2 TIMES DAILY
Qty: 10 CAPSULE | Refills: 0 | Status: SHIPPED | OUTPATIENT
Start: 2018-06-02 | End: 2018-06-07

## 2018-06-02 RX ORDER — LIDOCAINE 40 MG/G
CREAM TOPICAL
Status: DISCONTINUED | OUTPATIENT
Start: 2018-06-02 | End: 2018-06-02 | Stop reason: HOSPADM

## 2018-06-02 ASSESSMENT — ENCOUNTER SYMPTOMS
VOMITING: 0
DIFFICULTY URINATING: 1
CHILLS: 0
FEVER: 0

## 2018-06-02 NOTE — ED AVS SNAPSHOT
[facility] Emergency Department    [address]    [facility] [address]    Phone:  [phone]    Fax:  [phone]                                       Mahad Ward   MRN: [id number]    Department:  [facility] Emergency Department   Date of Visit:  6/2/2018           After Visit Summary Signature Page     I have received my discharge instructions, and my questions have been answered. I have discussed any challenges I see with this plan with the nurse or doctor.    ..........................................................................................................................................  Patient/Patient Representative Signature      ..........................................................................................................................................  Patient Representative Print Name and Relationship to Patient    ..................................................               ................................................  Date                                            Time    ..........................................................................................................................................  Reviewed by Signature/Title    ...................................................              ..............................................  Date                                                            Time

## 2018-06-02 NOTE — ED AVS SNAPSHOT
[facility] Emergency Department    [address]    [facility] [address]    Phone:  [phone]    Fax:  [phone]                                       Mahad Ward   MRN: [id number]    Department:  [facility] Emergency Department   Date of Visit:  6/2/2018           Patient Information     Date Of Birth          1945        Your diagnoses for this visit were:     Obstruction of Gonzalez catheter, initial encounter (H)     Abdominal cramping     Acute UTI        You were seen by Lelo Raygoza MD.      Follow-up Information     Follow up with Urology.    Why:  call on Monday        Follow up with [facility] Emergency Department.    Specialty:  EMERGENCY MEDICINE    Why:  As needed, If symptoms worsen    Contact information:    [address]  [facility] [id number] [phone]        Discharge Instructions       Discharge Instructions  Urinary Tract Infection  You or your child have been diagnosed with a urinary tract infection, or UTI. The urinary tract includes the kidneys (which make urine/pee), ureters (the tubes that carry urine/pee from the kidneys to the bladder), the bladder (which stores urine/pee), and urethra (the tube that carries urine/pee out of the bladder). Urinary tract infections occur when bacteria travel up the urethra into the bladder (bladder infection) and, in some cases, from there into the kidneys (kidney infection).  Generally, every Emergency Department visit should have a follow-up clinic visit with either a primary or a specialty clinic/provider. Please follow-up as instructed by your emergency provider today.  Return to the Emergency Department if:    You or your child have severe back pain.    You or your child are vomiting (throwing up) so that you cannot take your medicine.    You or your child have a new fever (had not previously had a fever) over 101 F.    You or your child have confusion or are  very weak, or feel very ill.    Your child seems much more ill, will not wake up, will not respond right, or is crying for a long time and will not calm down.    You or your child are showing signs of dehydration. These signs may include decreased urination (pee), dry mouth/gums/tongue, or decreased activity.    Follow-up with your provider:     Children under 24 months need to be seen by their regular provider within one week after a diagnosis of a UTI. It may be necessary to do some more tests to look at the child s kidney or bladder.    You should begin to feel better within 24 - 48 hours of starting your antibiotic; follow-up with your regular clinic/doctor/provider if this is not the case.    Treatment:     You will be treated with an antibiotic to kill the bacteria. We have to make an educated guess, based on what we know about common bacteria and antibiotics, as to which antibiotic will work for your infection. We will be correct most times but there will be some cases where the antibiotic chosen is not correct (see urine cultures below).    Take a pain medication such as acetaminophen (Tylenol ) or ibuprofen (Advil , Motrin , Nuprin ).    Phenazopyridine (Pyridium , Uristat ) is a prescription medication that numbs the bladder to reduce the burning pain of some UTIs.  The same medication is available in a non-prescription version (Azo-Standard , Urodol ). This medication will change the color of the urine and tears (usually blue or orange). If you wear contacts, do not wear them while taking this medication as they may be stained by the medication.    Urine Cultures:    If indicated, a urine culture may have been performed today. This test generally takes 24-48 hours to complete so the results are not known at this time. The results can confirm that an infection is present but also determine which antibiotic is effective for the specific bacteria that is causing the infection. If your urine culture shows  "that the antibiotic you were given today will not work to treat your infection, we will attempt to contact you to make arrangements to change the antibiotic. If the culture confirms that the antibiotic is effective for your infection, you will not be contacted. We often recommend follow-up with your regular physician/provider on the culture results regardless of this process.    Antibiotic Warning:     If you have been placed on antibiotics - watch for signs of allergic reaction.  These include rash, lip swelling, difficulty breathing, wheezing, and dizziness.  If you develop any of these symptoms, stop the antibiotic immediately and go to an emergency room or urgent care for evaluation.    Probiotics: If you have been given an antibiotic, you may want to also take a probiotic pill or eat yogurt with live cultures. Probiotics have \"good bacteria\" to help your intestines stay healthy. Studies have shown that probiotics help prevent diarrhea and other intestine problems (including C. diff infection) when you take antibiotics. You can buy these without a prescription in the pharmacy section of the store.   If you were given a prescription for medicine here today, be sure to read all of the information (including the package insert) that comes with your prescription.  This will include important information about the medicine, its side effects, and any warnings that you need to know about.  The pharmacist who fills the prescription can provide more information and answer questions you may have about the medicine.  If you have questions or concerns that the pharmacist cannot address, please call or return to the Emergency Department.   Remember that you can always come back to the Emergency Department if you are not able to see your regular provider in the amount of time listed above, if you get any new symptoms, or if there is anything that worries you.        24 Hour Appointment Hotline       To make an appointment at " any [facility] clinic, call [phone] ([phone]). If you don't have a family doctor or clinic, we will help you find one. [facility] are conveniently located to serve the needs of you and your family.             Review of your medicines      START taking        Dose / Directions Last dose taken    nitroFURantoin (macrocrystal-monohydrate) 100 MG capsule   Commonly known as:  MACROBID   Dose:  100 mg   Quantity:  10 capsule        Take 1 capsule (100 mg) by mouth 2 times daily for 5 days   Refills:  0          Our records show that you are taking the medicines listed below. If these are incorrect, please call your family doctor or clinic.        Dose / Directions Last dose taken    ALEVE PO        Refills:  0        CENTRUM SILVER per tablet   Dose:  1 tablet        Take 1 tablet by mouth daily   Refills:  0        COMBIVENT RESPIMAT  MCG/ACT inhaler   Dose:  1 puff   Generic drug:  Ipratropium-Albuterol        Inhale 1 puff into the lungs 4 times daily   Refills:  0        FLOMAX 0.4 MG capsule   Generic drug:  tamsulosin        Take by mouth daily   Refills:  0        mometasone-formoterol 200-5 MCG/ACT oral inhaler   Commonly known as:  DULERA   Dose:  2 puff        Inhale 2 puffs into the lungs 2 times daily   Refills:  0        VITAMIN D (CHOLECALCIFEROL) PO        Take by mouth daily   Refills:  0                Prescriptions were sent or printed at these locations (1 Prescription)                   Other Prescriptions                Printed at Department/Unit printer (1 of 1)         nitroFURantoin, macrocrystal-monohydrate, (MACROBID) 100 MG capsule                Procedures and tests performed during your visit     Procedure/Test Number of Times Performed    Basic metabolic panel 1    CBC with platelets differential 1    UA with Microscopic 1    Urine Culture Aerobic Bacterial 2      Orders Needing Specimen Collection     None      Pending Results     Date and Time Order Name Status  Description    6/2/2018 0915 Urine Culture Aerobic Bacterial In process             Pending Culture Results     Date and Time Order Name Status Description    6/2/2018 0915 Urine Culture Aerobic Bacterial In process             Pending Results Instructions     If you had any lab results that were not finalized at the time of your Discharge, you can call the ED Lab Result RN at [phone]. You will be contacted by this team for any positive Lab results or changes in treatment. The nurses are available 7 days a week from 10A to 6:30P.  You can leave a message 24 hours per day and they will return your call.        Test Results From Your Hospital Stay        6/2/2018  9:18 AM      Component Results     Component Value Ref Range & Units Status    WBC 5.2 4.0 - 11.0 10e9/L Final    RBC Count 4.57 4.4 - 5.9 10e12/L Final    Hemoglobin 13.7 13.3 - 17.7 g/dL Final    Hematocrit 42.4 40.0 - 53.0 % Final    MCV 93 78 - 100 fl Final    MCH 30.0 26.5 - 33.0 pg Final    MCHC 32.3 31.5 - 36.5 g/dL Final    RDW 13.4 10.0 - 15.0 % Final    Platelet Count 251 150 - 450 10e9/L Final    Diff Method Automated Method  Final    % Neutrophils 60.4 % Final    % Lymphocytes 25.1 % Final    % Monocytes 8.3 % Final    % Eosinophils 5.2 % Final    % Basophils 0.6 % Final    % Immature Granulocytes 0.4 % Final    Nucleated RBCs 0 0 /100 Final    Absolute Neutrophil 3.1 1.6 - 8.3 10e9/L Final    Absolute Lymphocytes 1.3 0.8 - 5.3 10e9/L Final    Absolute Monocytes 0.4 0.0 - 1.3 10e9/L Final    Absolute Eosinophils 0.3 0.0 - 0.7 10e9/L Final    Absolute Basophils 0.0 0.0 - 0.2 10e9/L Final    Abs Immature Granulocytes 0.0 0 - 0.4 10e9/L Final    Absolute Nucleated RBC 0.0  Final         6/2/2018  9:33 AM      Component Results     Component Value Ref Range & Units Status    Sodium 141 133 - 144 mmol/L Final    Potassium 3.9 3.4 - 5.3 mmol/L Final    Chloride 108 94 - 109 mmol/L Final    Carbon Dioxide 29 20 - 32 mmol/L Final    Anion Gap 4 3 -  14 mmol/L Final    Glucose 115 (H) 70 - 99 mg/dL Final    Urea Nitrogen 17 7 - 30 mg/dL Final    Creatinine 0.82 0.66 - 1.25 mg/dL Final    GFR Estimate >90 >60 mL/min/1.7m2 Final    Non  GFR Calc    GFR Estimate If Black >90 >60 mL/min/1.7m2 Final    African American GFR Calc    Calcium 8.7 8.5 - 10.1 mg/dL Final         6/2/2018  9:21 AM      Component Results     Component Value Ref Range & Units Status    Color Urine Yellow  Final    Appearance Urine Clear  Final    Glucose Urine Negative NEG^Negative mg/dL Final    Bilirubin Urine Negative NEG^Negative Final    Ketones Urine Negative NEG^Negative mg/dL Final    Specific Gravity Urine 1.011 1.003 - 1.035 Final    Blood Urine Moderate (A) NEG^Negative Final    pH Urine 7.0 5.0 - 7.0 pH Final    Protein Albumin Urine Negative NEG^Negative mg/dL Final    Urobilinogen mg/dL 0.0 0.0 - 2.0 mg/dL Final    Nitrite Urine Positive (A) NEG^Negative Final    Leukocyte Esterase Urine Large (A) NEG^Negative Final    Source Catheterized Urine  Final    WBC Urine 15 (H) 0 - 5 /HPF Final    RBC Urine 9 (H) 0 - 2 /HPF Final    Bacteria Urine Few (A) NEG^Negative /HPF Final    Squamous Epithelial /HPF Urine <1 0 - 1 /HPF Final    Mucous Urine Present (A) NEG^Negative /LPF Final         6/2/2018  9:42 AM      Component Results     Component    Specimen Description    Midstream Urine    Culture Micro    Canceled, Test credited  Duplicate request           6/2/2018  9:33 AM                Clinical Quality Measure: Blood Pressure Screening     Your blood pressure was checked while you were in the emergency department today. The last reading we obtained was  BP: (!) 151/91 . Please read the guidelines below about what these numbers mean and what you should do about them.  If your systolic blood pressure (the top number) is less than 120 and your diastolic blood pressure (the bottom number) is less than 80, then your blood pressure is normal. There is nothing more that  "you need to do about it.  If your systolic blood pressure (the top number) is 120-139 or your diastolic blood pressure (the bottom number) is 80-89, your blood pressure may be higher than it should be. You should have your blood pressure rechecked within a year by a primary care provider.  If your systolic blood pressure (the top number) is 140 or greater or your diastolic blood pressure (the bottom number) is 90 or greater, you may have high blood pressure. High blood pressure is treatable, but if left untreated over time it can put you at risk for heart attack, stroke, or kidney failure. You should have your blood pressure rechecked by a primary care provider within the next 4 weeks.  If your provider in the emergency department today gave you specific instructions to follow-up with your doctor or provider even sooner than that, you should follow that instruction and not wait for up to 4 weeks for your follow-up visit.        Thank you for choosing [facility]       Thank you for choosing [facility] for your care. Our goal is always to provide you with excellent care. Hearing back from our patients is one way we can continue to improve our services. Please take a few minutes to complete the written survey that you may receive in the mail after you visit with us. Thank you!        [employer] Information     [employer] lets you send messages to your doctor, view your test results, renew your prescriptions, schedule appointments and more. To sign up, go to [email or web address] . Click on \"Log in\" on the left side of the screen, which will take you to the Welcome page. Then click on \"Sign up Now\" on the right side of the page.     You will be asked to enter the access code listed below, as well as some personal information. Please follow the directions to create your username and password.     Your access code is: [email or web address]  Expires: 2018  3:24 PM     Your access code will  in 90 days. If you need help or a new " code, please call your [facility] clinic or [phone].        Care EveryWhere ID     This is your Care EveryWhere ID. This could be used by other organizations to access your [facility] medical records  [id number]        Equal Access to Services     KAYLEEN FLORES : Juan Bruno, waaxda luqadaha, qaybta kaalmada aiyana, amanda lucio. So [facility] [phone].    ATENCIÓN: Si habla español, tiene a herbert disposición servicios gratuitos de asistencia lingüística. Llame al [phone].    We comply with applicable federal civil rights laws and [place] laws. We do not discriminate on the basis of race, color, national origin, age, disability, sex, sexual orientation, or gender identity.            After Visit Summary       This is your record. Keep this with you and show to your community pharmacist(s) and doctor(s) at your next visit.

## 2018-06-02 NOTE — ED TRIAGE NOTES
Patient has barba catheter in place and states that it didn't drain this AM. Patient scheduled to have surgery on prostate this Wednesday. Presents with back pain and bladder pressure and pain.

## 2018-06-02 NOTE — ED PROVIDER NOTES
History     Chief Complaint:  Catheter problem     HPI   Mahad Ward is a 72 year old male, with a history of benign prostatic hyperplasia, who presents with catheter problem. The patient was seen at the ED about two weeks (5/20/2018) prior for urinary retention. The patient was sent home with a catheter and was advised to follow up with urology. Today the patient reports with a catheter problem. He states that since last night he has not noticed any new urine and has had the urge to go. He is currently experiencing cramping. However, denies any fevers, chills, or vomiting. He is otherwise drinking and eating normally. The patient notes chronic shortness of breath that is unchanged. He is currently being seen by Dr. Neff for Urology.       Allergies:  Levaquin     Medications:    Combivent Respimat   Dulera  Centrum silver  Flomax    Past Medical History:    Benign prostatic hyperplasia   Chronic obstructive pulmonary disease    Past Surgical History:    History reviewed. No pertinent past surgical history.    Family History:    The patient denies any relevant family medical history.    Social History:  Here alone  Marital Status:   [2]      Review of Systems   Constitutional: Negative for chills and fever.   Respiratory: Positive for shortness of breath ( chronic, unchanged).    Gastrointestinal: Negative for vomiting.   Genitourinary: Positive for difficulty urinating.   All other systems reviewed and are negative.    Physical Exam   Vitals:  Patient Vitals for the past 24 hrs:   BP Temp Temp src Pulse Resp SpO2   06/02/18 0828 (!) 151/91 97.8  F (36.6  C) Oral 87 16 95 %       Physical Exam  General: Elderly male sitting upright.   Eyes: PERRL, Conjunctive within normal limits  ENT: Moist mucous membranes, oropharynx clear.   CV: Normal S1S2, no murmur, rub or gallop. Regular rate and rhythm  Resp: Clear to auscultation bilaterally, no wheezes, rales or rhonchi. Normal respiratory effort.  GI:  Abdomen is soft, no cva tenderness to percussion. Mild tenderness to the suprapubic region. Nondistended. No palpable masses. No rebound or guarding.  MSK: No edema. Nontender. Normal active range of motion.  Skin: Warm and dry. No rashes or lesions or ecchymoses on visible skin.  Neuro: Alert and oriented. Responds appropriately to all questions and commands. No focal findings appreciated. Normal muscle tone.  Psych: Normal mood and affect. Pleasant.  Emergency Department Course   Laboratory:  Laboratory findings were communicated with the patient who voiced understanding of the findings.  Urine Culture Aerobic Bacterial (Pending)  CBC: AWNL (WBC 5.2, HGB 13.7, )  BMP: Glucose 115(H); O/W WNL (Creatinine 0.82)  UA w/ Microscopic Urine blood Moderate (A); Nitrite Positive (A); Leukocyte Esterase urine Large (A); WBC/HPF 15 (H); RBC/HPF 9 (H); Bacteria Few (A); Mucous Urine Present (A) O/W WNL     Emergency Department Course:  Nursing notes and vitals reviewed.  I performed an exam of the patient as documented above.   The patient provided a urine sample here in the emergency department. This was sent for laboratory testing, findings above.  IV was inserted and blood was drawn for laboratory testing, results above.    10:04 I checked in with the patient. He feels completely better. Denies any new concerns.    I personally reviewed the laboratory results with the patient and answered all related questions prior to discharge.    Impression & Plan      Medical Decision Making:  Mahad Ward is a 72 year old male with a history of prostate hypertrophy, currently with a barba catheter in place for the last 10 days, who presents to the emergency department with concerns for lack of outflow. Barba was replaced here without difficulty and with good urine output and resolution of his abdominal cramping/pressure. There is no hematuria. He does have evidence of pyuria and in the setting with nitrites present as well, he  will be treated while waiting for urine culture. He has an ciprofloxacin so we will discharge him on Macrobid for 5 days. He has surgery reported on Wednesday and should keep this follow up. He should call the urology clinic on Monday and discuss what happened. Return immediately back to the emergency department if fever, vomiting, pain etc. He was passing urine normally with the replacement of barba catheter here in the emergency department. Al questions answered prior to discharge.     Diagnosis:    ICD-10-CM    1. Obstruction of Barba catheter, initial encounter (H) T83.091A    2. Abdominal cramping R10.9    3. Acute UTI N39.0        Disposition:   Discharged.        Discharge Medications:  Discharge Medication List as of 6/2/2018 10:12 AM      START taking these medications    Details   nitroFURantoin, macrocrystal-monohydrate, (MACROBID) 100 MG capsule Take 1 capsule (100 mg) by mouth 2 times daily for 5 days, Disp-10 capsule, R-0, Local Print             Scribe Disclosure:  I, Remberto Farfan, am serving as a scribe at 8:35 AM on 6/2/2018 to document services personally performed by Lelo Raygoza MD, based on my observations and the provider's statements to me.  [facility] EMERGENCY DEPARTMENT       Lelo Raygoza MD  06/03/18 8336

## 2018-06-03 ASSESSMENT — ENCOUNTER SYMPTOMS: SHORTNESS OF BREATH: 1

## 2018-06-05 LAB
BACTERIA SPEC CULT: ABNORMAL
BACTERIA SPEC CULT: ABNORMAL
Lab: ABNORMAL
SPECIMEN SOURCE: ABNORMAL

## 2018-07-30 ENCOUNTER — HOSPITAL ENCOUNTER (INPATIENT)
Facility: CLINIC | Age: 73
LOS: 5 days | Discharge: HOME | DRG: 193 | End: 2018-08-04
Attending: EMERGENCY MEDICINE | Admitting: INTERNAL MEDICINE
Payer: MEDICARE

## 2018-07-30 ENCOUNTER — APPOINTMENT (OUTPATIENT)
Dept: GENERAL RADIOLOGY | Facility: CLINIC | Age: 73
DRG: 193 | End: 2018-07-30
Attending: EMERGENCY MEDICINE
Payer: MEDICARE

## 2018-07-30 DIAGNOSIS — R79.89 ELEVATED TROPONIN: ICD-10-CM

## 2018-07-30 DIAGNOSIS — R09.02 HYPOXIA: ICD-10-CM

## 2018-07-30 DIAGNOSIS — J44.1 COPD EXACERBATION (H): ICD-10-CM

## 2018-07-30 DIAGNOSIS — N28.9 RENAL INSUFFICIENCY: ICD-10-CM

## 2018-07-30 DIAGNOSIS — I82.402 ACUTE DEEP VEIN THROMBOSIS (DVT) OF LEFT LOWER EXTREMITY, UNSPECIFIED VEIN (H): Primary | ICD-10-CM

## 2018-07-30 PROBLEM — J18.9 PNEUMONIA: Status: ACTIVE | Noted: 2018-07-30

## 2018-07-30 LAB
ALBUMIN SERPL-MCNC: 2.5 G/DL (ref 3.4–5)
ALP SERPL-CCNC: 133 U/L (ref 40–150)
ALT SERPL W P-5'-P-CCNC: 27 U/L (ref 0–70)
ANION GAP SERPL CALCULATED.3IONS-SCNC: 7 MMOL/L (ref 3–14)
AST SERPL W P-5'-P-CCNC: 19 U/L (ref 0–45)
BASE EXCESS BLDV CALC-SCNC: 1.5 MMOL/L
BASOPHILS # BLD AUTO: 0 10E9/L (ref 0–0.2)
BASOPHILS NFR BLD AUTO: 0.2 %
BILIRUB SERPL-MCNC: 0.8 MG/DL (ref 0.2–1.3)
BUN SERPL-MCNC: 38 MG/DL (ref 7–30)
CALCIUM SERPL-MCNC: 9 MG/DL (ref 8.5–10.1)
CHLORIDE SERPL-SCNC: 104 MMOL/L (ref 94–109)
CO2 BLDCOV-SCNC: 28 MMOL/L (ref 21–28)
CO2 SERPL-SCNC: 29 MMOL/L (ref 20–32)
CREAT SERPL-MCNC: 1.27 MG/DL (ref 0.66–1.25)
DIFFERENTIAL METHOD BLD: ABNORMAL
EOSINOPHIL # BLD AUTO: 0 10E9/L (ref 0–0.7)
EOSINOPHIL NFR BLD AUTO: 0.1 %
ERYTHROCYTE [DISTWIDTH] IN BLOOD BY AUTOMATED COUNT: 14.1 % (ref 10–15)
GFR SERPL CREATININE-BSD FRML MDRD: 55 ML/MIN/1.7M2
GLUCOSE SERPL-MCNC: 126 MG/DL (ref 70–99)
HCO3 BLDV-SCNC: 29 MMOL/L (ref 21–28)
HCT VFR BLD AUTO: 43 % (ref 40–53)
HGB BLD-MCNC: 13.4 G/DL (ref 13.3–17.7)
IMM GRANULOCYTES # BLD: 0.1 10E9/L (ref 0–0.4)
IMM GRANULOCYTES NFR BLD: 0.9 %
LACTATE BLD-SCNC: 1.4 MMOL/L (ref 0.7–2.1)
LYMPHOCYTES # BLD AUTO: 0.9 10E9/L (ref 0.8–5.3)
LYMPHOCYTES NFR BLD AUTO: 7.3 %
MCH RBC QN AUTO: 30 PG (ref 26.5–33)
MCHC RBC AUTO-ENTMCNC: 31.2 G/DL (ref 31.5–36.5)
MCV RBC AUTO: 96 FL (ref 78–100)
MONOCYTES # BLD AUTO: 1 10E9/L (ref 0–1.3)
MONOCYTES NFR BLD AUTO: 8.2 %
NEUTROPHILS # BLD AUTO: 10.2 10E9/L (ref 1.6–8.3)
NEUTROPHILS NFR BLD AUTO: 83.3 %
NRBC # BLD AUTO: 0 10*3/UL
NRBC BLD AUTO-RTO: 0 /100
NT-PROBNP SERPL-MCNC: 9383 PG/ML (ref 0–900)
O2/TOTAL GAS SETTING VFR VENT: ABNORMAL %
OXYHGB MFR BLDV: 76 %
PCO2 BLDV: 58 MM HG (ref 40–50)
PCO2 BLDV: 59 MM HG (ref 40–50)
PH BLDV: 7.3 PH (ref 7.32–7.43)
PH BLDV: 7.3 PH (ref 7.32–7.43)
PLATELET # BLD AUTO: 427 10E9/L (ref 150–450)
PO2 BLDV: 42 MM HG (ref 25–47)
PO2 BLDV: 45 MM HG (ref 25–47)
POTASSIUM SERPL-SCNC: 5 MMOL/L (ref 3.4–5.3)
PROCALCITONIN SERPL-MCNC: 0.6 NG/ML
PROT SERPL-MCNC: 8.1 G/DL (ref 6.8–8.8)
RBC # BLD AUTO: 4.46 10E12/L (ref 4.4–5.9)
SAO2 % BLDV FROM PO2: 71 %
SODIUM SERPL-SCNC: 140 MMOL/L (ref 133–144)
TROPONIN I SERPL-MCNC: 0.19 UG/L (ref 0–0.04)
TROPONIN I SERPL-MCNC: 0.21 UG/L (ref 0–0.04)
TROPONIN I SERPL-MCNC: 0.26 UG/L (ref 0–0.04)
WBC # BLD AUTO: 12.3 10E9/L (ref 4–11)

## 2018-07-30 PROCEDURE — 40000809 ZZH STATISTIC NO DOCUMENTATION TO SUPPORT CHARGE

## 2018-07-30 PROCEDURE — 40000274 ZZH STATISTIC RCP CONSULT EA 30 MIN

## 2018-07-30 PROCEDURE — 40000275 ZZH STATISTIC RCP TIME EA 10 MIN

## 2018-07-30 PROCEDURE — 25000125 ZZHC RX 250: Performed by: EMERGENCY MEDICINE

## 2018-07-30 PROCEDURE — 36415 COLL VENOUS BLD VENIPUNCTURE: CPT | Performed by: PHYSICIAN ASSISTANT

## 2018-07-30 PROCEDURE — 83605 ASSAY OF LACTIC ACID: CPT

## 2018-07-30 PROCEDURE — 93005 ELECTROCARDIOGRAM TRACING: CPT

## 2018-07-30 PROCEDURE — 99223 1ST HOSP IP/OBS HIGH 75: CPT | Mod: AI | Performed by: INTERNAL MEDICINE

## 2018-07-30 PROCEDURE — 94644 CONT INHLJ TX 1ST HOUR: CPT

## 2018-07-30 PROCEDURE — 85025 COMPLETE CBC W/AUTO DIFF WBC: CPT | Performed by: EMERGENCY MEDICINE

## 2018-07-30 PROCEDURE — 82805 BLOOD GASES W/O2 SATURATION: CPT | Performed by: EMERGENCY MEDICINE

## 2018-07-30 PROCEDURE — 99285 EMERGENCY DEPT VISIT HI MDM: CPT | Mod: 25

## 2018-07-30 PROCEDURE — 96365 THER/PROPH/DIAG IV INF INIT: CPT

## 2018-07-30 PROCEDURE — 94640 AIRWAY INHALATION TREATMENT: CPT | Mod: 76

## 2018-07-30 PROCEDURE — 84484 ASSAY OF TROPONIN QUANT: CPT | Performed by: PHYSICIAN ASSISTANT

## 2018-07-30 PROCEDURE — 84484 ASSAY OF TROPONIN QUANT: CPT | Performed by: EMERGENCY MEDICINE

## 2018-07-30 PROCEDURE — 12000007 ZZH R&B INTERMEDIATE

## 2018-07-30 PROCEDURE — 84145 PROCALCITONIN (PCT): CPT | Performed by: EMERGENCY MEDICINE

## 2018-07-30 PROCEDURE — 96367 TX/PROPH/DG ADDL SEQ IV INF: CPT

## 2018-07-30 PROCEDURE — 87040 BLOOD CULTURE FOR BACTERIA: CPT | Performed by: EMERGENCY MEDICINE

## 2018-07-30 PROCEDURE — 80053 COMPREHEN METABOLIC PANEL: CPT | Performed by: EMERGENCY MEDICINE

## 2018-07-30 PROCEDURE — 36415 COLL VENOUS BLD VENIPUNCTURE: CPT | Performed by: EMERGENCY MEDICINE

## 2018-07-30 PROCEDURE — 82803 BLOOD GASES ANY COMBINATION: CPT

## 2018-07-30 PROCEDURE — 71046 X-RAY EXAM CHEST 2 VIEWS: CPT

## 2018-07-30 PROCEDURE — 94640 AIRWAY INHALATION TREATMENT: CPT

## 2018-07-30 PROCEDURE — 83880 ASSAY OF NATRIURETIC PEPTIDE: CPT | Performed by: EMERGENCY MEDICINE

## 2018-07-30 PROCEDURE — 96375 TX/PRO/DX INJ NEW DRUG ADDON: CPT

## 2018-07-30 PROCEDURE — 96366 THER/PROPH/DIAG IV INF ADDON: CPT

## 2018-07-30 PROCEDURE — 25000125 ZZHC RX 250: Performed by: PHYSICIAN ASSISTANT

## 2018-07-30 PROCEDURE — 25000128 H RX IP 250 OP 636: Performed by: EMERGENCY MEDICINE

## 2018-07-30 RX ORDER — ONDANSETRON 4 MG/1
4 TABLET, ORALLY DISINTEGRATING ORAL EVERY 6 HOURS PRN
Status: DISCONTINUED | OUTPATIENT
Start: 2018-07-30 | End: 2018-08-04 | Stop reason: HOSPADM

## 2018-07-30 RX ORDER — CEFTRIAXONE 1 G/1
1 INJECTION, POWDER, FOR SOLUTION INTRAMUSCULAR; INTRAVENOUS EVERY 24 HOURS
Status: DISCONTINUED | OUTPATIENT
Start: 2018-07-31 | End: 2018-08-04 | Stop reason: HOSPADM

## 2018-07-30 RX ORDER — ALBUTEROL SULFATE 5 MG/ML
10 SOLUTION, NON-ORAL INHALATION CONTINUOUS PRN
Status: DISCONTINUED | OUTPATIENT
Start: 2018-07-30 | End: 2018-07-30

## 2018-07-30 RX ORDER — ALBUTEROL SULFATE 0.83 MG/ML
3 SOLUTION RESPIRATORY (INHALATION)
Status: DISCONTINUED | OUTPATIENT
Start: 2018-07-30 | End: 2018-08-04 | Stop reason: HOSPADM

## 2018-07-30 RX ORDER — NALOXONE HYDROCHLORIDE 0.4 MG/ML
.1-.4 INJECTION, SOLUTION INTRAMUSCULAR; INTRAVENOUS; SUBCUTANEOUS
Status: DISCONTINUED | OUTPATIENT
Start: 2018-07-30 | End: 2018-07-30

## 2018-07-30 RX ORDER — ONDANSETRON 2 MG/ML
4 INJECTION INTRAMUSCULAR; INTRAVENOUS EVERY 6 HOURS PRN
Status: DISCONTINUED | OUTPATIENT
Start: 2018-07-30 | End: 2018-08-04 | Stop reason: HOSPADM

## 2018-07-30 RX ORDER — AMOXICILLIN 250 MG
2 CAPSULE ORAL 2 TIMES DAILY PRN
Status: DISCONTINUED | OUTPATIENT
Start: 2018-07-30 | End: 2018-08-04 | Stop reason: HOSPADM

## 2018-07-30 RX ORDER — AMOXICILLIN 250 MG
1 CAPSULE ORAL 2 TIMES DAILY PRN
Status: DISCONTINUED | OUTPATIENT
Start: 2018-07-30 | End: 2018-08-04 | Stop reason: HOSPADM

## 2018-07-30 RX ORDER — NALOXONE HYDROCHLORIDE 0.4 MG/ML
.1-.4 INJECTION, SOLUTION INTRAMUSCULAR; INTRAVENOUS; SUBCUTANEOUS
Status: DISCONTINUED | OUTPATIENT
Start: 2018-07-30 | End: 2018-08-04 | Stop reason: HOSPADM

## 2018-07-30 RX ORDER — FLUTICASONE PROPIONATE AND SALMETEROL 113; 14 UG/1; UG/1
1 POWDER, METERED RESPIRATORY (INHALATION) 2 TIMES DAILY
COMMUNITY

## 2018-07-30 RX ORDER — ACETAMINOPHEN 325 MG/1
650 TABLET ORAL EVERY 4 HOURS PRN
Status: DISCONTINUED | OUTPATIENT
Start: 2018-07-30 | End: 2018-08-04 | Stop reason: HOSPADM

## 2018-07-30 RX ORDER — CEFTRIAXONE 1 G/1
1 INJECTION, POWDER, FOR SOLUTION INTRAMUSCULAR; INTRAVENOUS ONCE
Status: COMPLETED | OUTPATIENT
Start: 2018-07-30 | End: 2018-07-30

## 2018-07-30 RX ORDER — IPRATROPIUM BROMIDE AND ALBUTEROL SULFATE 2.5; .5 MG/3ML; MG/3ML
3 SOLUTION RESPIRATORY (INHALATION) ONCE
Status: COMPLETED | OUTPATIENT
Start: 2018-07-30 | End: 2018-07-30

## 2018-07-30 RX ORDER — MAGNESIUM SULFATE 1 G/100ML
1 INJECTION INTRAVENOUS ONCE
Status: COMPLETED | OUTPATIENT
Start: 2018-07-30 | End: 2018-07-30

## 2018-07-30 RX ORDER — CHOLECALCIFEROL (VITAMIN D3) 50 MCG
2000 TABLET ORAL EVERY MORNING
COMMUNITY

## 2018-07-30 RX ORDER — METHYLPREDNISOLONE SODIUM SUCCINATE 125 MG/2ML
125 INJECTION, POWDER, LYOPHILIZED, FOR SOLUTION INTRAMUSCULAR; INTRAVENOUS ONCE
Status: COMPLETED | OUTPATIENT
Start: 2018-07-30 | End: 2018-07-30

## 2018-07-30 RX ORDER — LIDOCAINE 40 MG/G
CREAM TOPICAL
Status: DISCONTINUED | OUTPATIENT
Start: 2018-07-30 | End: 2018-08-04 | Stop reason: HOSPADM

## 2018-07-30 RX ORDER — METHYLPREDNISOLONE SODIUM SUCCINATE 125 MG/2ML
60 INJECTION, POWDER, LYOPHILIZED, FOR SOLUTION INTRAMUSCULAR; INTRAVENOUS DAILY
Status: DISCONTINUED | OUTPATIENT
Start: 2018-07-31 | End: 2018-08-03

## 2018-07-30 RX ORDER — POLYETHYLENE GLYCOL 3350 17 G/17G
17 POWDER, FOR SOLUTION ORAL DAILY PRN
Status: DISCONTINUED | OUTPATIENT
Start: 2018-07-30 | End: 2018-08-04 | Stop reason: HOSPADM

## 2018-07-30 RX ORDER — IPRATROPIUM BROMIDE AND ALBUTEROL SULFATE 2.5; .5 MG/3ML; MG/3ML
3 SOLUTION RESPIRATORY (INHALATION) EVERY 4 HOURS
Status: DISCONTINUED | OUTPATIENT
Start: 2018-07-30 | End: 2018-08-01

## 2018-07-30 RX ORDER — AZITHROMYCIN 250 MG/1
250 TABLET, FILM COATED ORAL DAILY
Status: COMPLETED | OUTPATIENT
Start: 2018-07-31 | End: 2018-08-03

## 2018-07-30 RX ORDER — SODIUM CHLORIDE, SODIUM LACTATE, POTASSIUM CHLORIDE, CALCIUM CHLORIDE 600; 310; 30; 20 MG/100ML; MG/100ML; MG/100ML; MG/100ML
1000 INJECTION, SOLUTION INTRAVENOUS CONTINUOUS
Status: DISCONTINUED | OUTPATIENT
Start: 2018-07-30 | End: 2018-07-30

## 2018-07-30 RX ADMIN — CEFTRIAXONE SODIUM 1 G: 1 INJECTION, POWDER, FOR SOLUTION INTRAMUSCULAR; INTRAVENOUS at 15:57

## 2018-07-30 RX ADMIN — IPRATROPIUM BROMIDE AND ALBUTEROL SULFATE 3 ML: .5; 3 SOLUTION RESPIRATORY (INHALATION) at 20:03

## 2018-07-30 RX ADMIN — ALBUTEROL SULFATE 10 MG: 5 SOLUTION RESPIRATORY (INHALATION) at 15:42

## 2018-07-30 RX ADMIN — MAGNESIUM SULFATE IN DEXTROSE 1 G: 10 INJECTION, SOLUTION INTRAVENOUS at 18:01

## 2018-07-30 RX ADMIN — IPRATROPIUM BROMIDE AND ALBUTEROL SULFATE 3 ML: .5; 3 SOLUTION RESPIRATORY (INHALATION) at 14:58

## 2018-07-30 RX ADMIN — SODIUM CHLORIDE, POTASSIUM CHLORIDE, SODIUM LACTATE AND CALCIUM CHLORIDE 1000 ML: 600; 310; 30; 20 INJECTION, SOLUTION INTRAVENOUS at 18:29

## 2018-07-30 RX ADMIN — AZITHROMYCIN MONOHYDRATE 500 MG: 500 INJECTION, POWDER, LYOPHILIZED, FOR SOLUTION INTRAVENOUS at 16:34

## 2018-07-30 RX ADMIN — IPRATROPIUM BROMIDE AND ALBUTEROL SULFATE 3 ML: .5; 3 SOLUTION RESPIRATORY (INHALATION) at 23:36

## 2018-07-30 RX ADMIN — SODIUM CHLORIDE 500 ML: 9 INJECTION, SOLUTION INTRAVENOUS at 15:32

## 2018-07-30 RX ADMIN — METHYLPREDNISOLONE SODIUM SUCCINATE 125 MG: 125 INJECTION, POWDER, FOR SOLUTION INTRAMUSCULAR; INTRAVENOUS at 15:34

## 2018-07-30 ASSESSMENT — ACTIVITIES OF DAILY LIVING (ADL)
FALL_HISTORY_WITHIN_LAST_SIX_MONTHS: NO
SWALLOWING: 0-->SWALLOWS FOODS/LIQUIDS WITHOUT DIFFICULTY
RETIRED_COMMUNICATION: 0-->UNDERSTANDS/COMMUNICATES WITHOUT DIFFICULTY
AMBULATION: 0-->INDEPENDENT
RETIRED_EATING: 0-->INDEPENDENT
DRESS: 0-->INDEPENDENT
ADLS_ACUITY_SCORE: 15
BATHING: 0-->INDEPENDENT
TOILETING: 0-->INDEPENDENT
COGNITION: 0 - NO COGNITION ISSUES REPORTED
TRANSFERRING: 0-->INDEPENDENT

## 2018-07-30 ASSESSMENT — ENCOUNTER SYMPTOMS
SHORTNESS OF BREATH: 1
COUGH: 1
FEVER: 0

## 2018-07-30 NOTE — IP AVS SNAPSHOT
"` `     [place] [address] MEDICAL SURGICAL: [phone]                 INTERAGENCY TRANSFER FORM - NOTES (H&P, Discharge Summary, Consults, Procedures, Therapies)   2018                    Hospital Admission Date: 2018  GREGOR COHEN   : 1945  Sex: Male        Patient PCP Information     Provider PCP Type    [facility]         History & Physicals      H&P by Michelle Yeh PA-C at 2018  6:01 PM     Author:  Michelle Yeh PA-C Service:  Internal Medicine Author Type:  Physician Assistant - C    Filed:  2018  9:11 PM Date of Service:  2018  6:01 PM Creation Time:  2018  6:00 PM    Status:  Attested :  Michelle Yeh PA-C (Physician Assistant - C)    Cosigner:  Raul Sanford MD at 2018  9:49 PM        Attestation signed by Raul Sanford MD at 2018  9:49 PM        Attestation:     Patient seen and examined, discussed with CRISTOFER Azar.  In brief, Bossman Cohen is a pleasant 72-year-old man who quit smoking 4 years ago with past medical history significant for oxygen dependent COPD with relatively recent TURP on 18 who presents with recent productive cough and dyspnea.  He feels he has been sick for quite some time at home, coughing up green sputum and came in tonight due to worsening symptoms.     /56  Pulse 105  Temp 96.8  F (36  C) (Oral)  Resp 24  Ht 1.803 m (5' 11\")  Wt 75.8 kg (167 lb)  SpO2 96%  BMI 23.29 kg/m2  On exam after arrival to the medical floor he is sitting up in bed and was resting comfortably upon my arrival.  Heart is regular rate and rhythm  Lungs actually have good air movement with faint end expiratory wheezes and only mild bilateral crackles  Lower extremities are notable for 1+ pitting edema bilaterally with chronic venous changes noted on his left leg but not his right.     Lab workup was remarkable for BUN of 38, creatinine 1.28, albumin low at 2.5 and " elevated BNP at 9383.  Troponin was also elevated at 0.256 upon presentation.     EKG showed sinus tachycardia     Chest x-ray showed bilateral patchy pulmonary infiltrates.     Assessment/plan: Acute hypoxemic respiratory failure.  He notes he feels much better than when he arrived in the emergency room.  Clinically he likely does have a COPD exacerbation due to either bronchitis or atypical pneumonia though his elevated BNP raises the question of possible flash pulmonary edema or even a cardiomyopathy leading to pulmonary edema.  --Agree with ongoing treatment directed primarily at COPD with steroids, nebs, antibiotics etc.  --Have ordered a transthoracic echocardiogram to evaluate further for cardiomyopathy.  If he slow to improve I would certainly consider trial of IV Lasix given his elevated BNP and lower extremity edema.     Raul Sanford MD  07/30/2018                                                           History and Physical     Mahad Ward MRN# [id number]   YOB: 1945 Age: 72 year old      Date of Admission:  7/30/2018    Primary care provider: [facility]          Assessment and Plan:   Mahad Ward is a 72 year old male with a PMH significant for[AK1.1] O2 dependent COPD and BPH s/p TURP on 6/6/18[AK1.2], who presents with[AK1.1] increased cough and SOB.    1. Acute on chronic respiratory failure - underlying O2 dependent COPD, bilateral infiltrates on CXR with elevated WBC at 12.3. Likely COPD exacerbation due to pneumonia. BNP and troponin are also elevated, 9300 and 0.256 respectively, raising concern for cardiogenic cause and possible pulmonary edema. Continue O2, wean to baseline 3L. Management of COPD and pneumonia per below. Will trend trops, monitor on tele and get an echo in the AM.   2. COPD exacerbation due to pneumonia - 1 week of increasing productive cough of yellow sputum and increased SOB. Afebrile, tachypneic, mild tachycardia and elevated WBC  of 12.3. Received IV Rocephin and Azithromycin in ED, will continue. Also received 125 mg IV solu-medrol, Duoneb and continuous albuterol neb for 1 hr. He also received 1L LR, 500 mL NS and mag sulfate. Continue scheduled Duonebs, PRN albuterol nebs and 60 mg IV solu-medrol daily. Wean to baseline O2 of 3L.   3. RK - Cr 1.27, BUN 38. Possible mild dehydration due to poor PO intake due to SOB vs mild fluid overload. Received 1.5 L of IVFs in ED. Hold off on further hydration, recheck in AM. Echo per above, pending results, may consider diuresis. Pt also recently underwent TURP which may be contributing.   4. BPH - s/p TURP on 6/6. Resume home meds  5. Leukocytosis - likely due to underlying pneumonia. procalcitonin pending.[AK1.2]       Prophylaxis -[AK1.1] mechanical[AK1.2]   Code status -[AK1.1] Full Code[AK1.2]  Dispo -[AK1.1] admit to inpatient[AK1.2]                Chief Complaint:[AK1.1]   Cough, SOB[AK1.2]         History of Present Illness:   Mahad Ward is a 72 year old male with a PMH significant for[AK1.1] O2 dependent COPD and BPH s/p TURP on 6/6/18[AK1.2], who presents with[AK1.1] increased cough and SOB.  Patient reports 1 week of increasing productive cough.  He notes yellow sputum.  He notes that this is an increase from his baseline.  He also notes shortness of breath that is worse with exertion and mild associated chest pain.  He denies fever, chills, abdominal pain, nausea, vomiting, diarrhea or dysuria.  He is chronically on O2 at home, stating he is on 3 L.  Of note, he underwent a TURP in early June without any respiratory complications.[AK1.2]  In the ED,[AK1.1] vital signs relatively stable.  Heart rates 105, respirations 22 and SPO2 was 78% on room air.  Patient is maintained on 3 L of O2 at home and is satting 94-98% on 3 L.  CMP shows an elevated creatinine of 1.27, elevated BUN 38, otherwise unremarkable.  BNP is elevated at 9383, troponin is elevated at 0.256.  CBC shows a elevated  white count of 12.3, otherwise unremarkable.  Chest x-ray shows extensive patchy bilateral interstitial infiltrates.  EKG shows sinus rhythm with occasional PVC, right atrial enlargement, and pulmonary disease pattern.  He received 500 mL normal saline bolus, 1 L lactated Ringer bolus, DuoNeb, continuous albuterol neb for 1 hour, 1 g IV mag sulfate, 125 mg IV Solu-Medrol IV Rocephin and IV azithromycin.  He will be admitted for further management of COPD exacerbation likely due to underlying pneumonia.[AK1.2]    Hx obtained by speaking with ED physician, chart review and pt interview.               Past Medical History:[AK1.1]     Past Medical History:   Diagnosis Date     BPH (benign prostatic hyperplasia)      COPD (chronic obstructive pulmonary disease) (H)[AK1.3]                Past Surgical History:[AK1.1]   History reviewed. No pertinent surgical history.[AK1.3]            Social History:[AK1.1]     Social History     Social History     Marital status:      Spouse name: N/A     Number of children: N/A     Years of education: N/A     Occupational History     Not on file.     Social History Main Topics     Smoking status: Former Smoker     Smokeless tobacco: Never Used     Alcohol use No     Drug use: No     Sexual activity: Not on file     Other Topics Concern     Not on file     Social History Narrative[AK1.3]               Family History:[AK1.1]   Reviewed and noncontributory[AK1.2]         Allergies:[AK1.1]      Allergies   Allergen Reactions     Levaquin [Levofloxacin][AK1.3]                Medications:     Prior to Admission medications    Medication Sig Last Dose Taking? Auth Provider   Cholecalciferol (VITAMIN D3) 2000 units TABS Take 2,000 Units by mouth every morning 7/29/2018 at am Yes Unknown, Entered By History   Fluticasone-Salmeterol (AIRDUO RESPICLICK) 113-14 MCG/ACT AEPB inhaler Inhale 1 puff into the lungs 2 times daily  Yes Unknown, Entered By History   Ipratropium-Albuterol (COMBIVENT  "RESPIMAT)  MCG/ACT inhaler Inhale 1 puff into the lungs every 4 hours   Yes Reported, Patient   Multiple Vitamins-Minerals (CENTRUM SILVER) per tablet Take 1 tablet by mouth daily 7/29/2018 at am Yes Reported, Patient              Review of Systems:   A Comprehensive greater than 10 system review of systems was carried out.  Pertinent positives and negatives are noted above.  Otherwise negative for contributory information.     Review Of Systems  Skin:[AK1.1] negative[AK1.2]  Eyes:[AK1.1] negative[AK1.2]  Ears/Nose/Throat:[AK1.1] negative[AK1.2]  Respiratory:[AK1.1] No shortness of breath, dyspnea on exertion, cough, or hemoptysis[AK1.2]  Cardiovascular:[AK1.1] negative[AK1.2]  Gastrointestinal:[AK1.1] negative[AK1.2]  Genitourinary:[AK1.1] negative[AK1.2]  Musculoskeletal:[AK1.1] negative[AK1.2]  Neurologic:[AK1.1] negative[AK1.2]  Psychiatric:[AK1.1] negative[AK1.2]  Hematologic/Lymphatic/Immunologic:[AK1.1] negative[AK1.2]  Endocrine:[AK1.1] negative[AK1.2]             Physical Exam:[AK1.1]   Blood pressure 113/80, pulse 105, temperature 98.6  F (37  C), temperature source Oral, resp. rate 22, height 1.803 m (5' 11\"), weight 81.6 kg (180 lb), SpO2 98 %.[AK1.3]  Exam:  GENERAL:  Comfortable.  PSYCH: pleasant, oriented, No acute distress.  HEENT:  Atraumatic, normocephalic. Normal conjunctiva, normal hearing, and oropharynx is normal.  NECK:  Supple, no neck vein distention  HEART:  Normal S1, S2 with no murmur, no pericardial rub, gallops or S3 or S4.  LUNGS:[AK1.1] Lung sounds throughout, slight inspiratory wheeze and expiratory wheeze[AK1.2],[AK1.1] mildly tachypneic[AK1.2] No rales or ronchi.  GI:  Soft, normal bowel sounds. Non-tender, non distended.  EXTREMITIES:[AK1.1] Trace bilateral lower extremity[AK1.2] edema, +2 pulses bilateral and equal.  SKIN:  Dry to touch, No rash, wound or ulcerations.  NEUROLOGIC:  CN 2-12 intact, BL 5/5 symmetric upper and lower extremity strength, sensation is intact with " no focal deficits.               Data:[AK1.1]       Recent Labs  Lab 07/30/18  1505   NTBNPI 9383*       Recent Labs  Lab 07/30/18  1505   WBC 12.3*   HGB 13.4   HCT 43.0   MCV 96          Recent Labs  Lab 07/30/18  1505      POTASSIUM 5.0   CHLORIDE 104   CO2 29   ANIONGAP 7   *   BUN 38*   CR 1.27*   GFRESTIMATED 55*   GFRESTBLACK 67   KATELYNN 9.0   PROTTOTAL 8.1   ALBUMIN 2.5*   BILITOTAL 0.8   ALKPHOS 133   AST 19   ALT 27     Invalid input(s): LACTIC ACID    Recent Labs  Lab 07/30/18  1922 07/30/18  1505   TROPI 0.213* 0.256*       Recent Results (from the past 48 hour(s))   XR Chest 2 Views    Narrative    XR CHEST 2 VW 7/30/2018 3:14 PM    HISTORY: Respiratory Distress;       Impression    IMPRESSION: Extensive patchy bilateral interstitial infiltrates.    LENORA WHATLEY MD[AK1.2]         Michelle Yeh PA-C    This patient was seen and discussed with [AK1.1] Claribel[AK1.2] who agrees with the current plans as outlined above.[AK1.1]          Revision History        User Key Date/Time User Provider Type Action    > AK1.2 7/30/2018  9:11 PM Michelle Yeh PA-C Physician Assistant - CHACHA Sign     AK1.3 7/30/2018  6:01 PM Michelle Yeh PA-C Physician Assistant - C      AK1.1 7/30/2018  6:00 PM Michelle Yeh PA-C Physician Assistant - CHACHA                      Discharge Summaries      Discharge Summaries by Stephane Perez MD at 8/4/2018 11:06 AM     Author:  Stephane Perez MD Service:  Hospitalist Author Type:  Physician    Filed:  8/4/2018 11:06 AM Date of Service:  8/4/2018 11:06 AM Creation Time:  8/4/2018 10:56 AM    Status:  Signed :  Stephane Perez MD (Physician)         [facility]  Discharge Summary  Name: Mahad Ward    MRN: [id number]  YOB: 1945    Age: 72 year old  Date of Discharge:[AG1.1]  8/4/2018[AG1.2]  Date of Admission: 7/30/2018  Primary Care Provider: [facility]  Discharge Physician:  Al  MD Chris  Discharging Service:  Hospitalist      Discharge Diagnosis:  Acute on chronic hypoxic respiratory failure secondary to COPD and acute exacerbation with pulmonary hypertension and cor pulmonale  Bilateral opacity and infiltrates suspected infectious in etiology with possible pneumonia cannot entirely rule out bacterial in etiology with moderate elevation of pro-calcitonin and earlier leukocytosis  Left lower extremity acute DVT likely provoke  Findings of pulmonary nodules inflammatory versus infectious or neoplastic  Elevated troponin I likely demand ischemia from #1  Grade 1 diastolic dysfunction     Other Diagnosis:[AG1.1]  Past Medical History:   Diagnosis Date     BPH (benign prostatic hyperplasia)      COPD (chronic obstructive pulmonary disease) (H)[AG1.3]           Discharge Disposition:  Discharged to home     Allergies:  Allergies   Allergen Reactions     Levaquin [Levofloxacin]         Discharge Medications:   Current Discharge Medication List      START taking these medications    Details   amoxicillin-clavulanate (AUGMENTIN) 875-125 MG per tablet Take 1 tablet by mouth 2 times daily for 1 day  Qty: 2 tablet, Refills: 0    Associated Diagnoses: COPD exacerbation (H)      benzonatate (TESSALON) 100 MG capsule Take 1 capsule (100 mg) by mouth 3 times daily as needed for cough  Qty: 15 capsule, Refills: 0    Associated Diagnoses: COPD exacerbation (H)      enoxaparin (LOVENOX) 80 MG/0.8ML injection Inject 0.74 mLs (74 mg) Subcutaneous every 12 hours  Qty: 2 Syringe, Refills: 0    Associated Diagnoses: Acute deep vein thrombosis (DVT) of left lower extremity, unspecified vein (H)      predniSONE (DELTASONE) 20 MG tablet Take 2 tabs (40 mg) by mouth daily x 1 day, 1 tabs (20 mg) daily x 2 days, 1/2 tab (10 mg) daily x 2 days.  Qty: 5 tablet, Refills: 0    Associated Diagnoses: COPD exacerbation (H)      warfarin (COUMADIN) 4 MG tablet Take 1 tablet (4 mg) by mouth daily  Qty: 7 tablet, Refills: 0  "   Comments: Repeat INR this coming Monday and adjust accordingly 8/6/2018  Associated Diagnoses: Acute deep vein thrombosis (DVT) of left lower extremity, unspecified vein (H)         CONTINUE these medications which have NOT CHANGED    Details   Cholecalciferol (VITAMIN D3) 2000 units TABS Take 2,000 Units by mouth every morning      Fluticasone-Salmeterol (AIRDUO RESPICLICK) 113-14 MCG/ACT AEPB inhaler Inhale 1 puff into the lungs 2 times daily      Ipratropium-Albuterol (COMBIVENT RESPIMAT)  MCG/ACT inhaler Inhale 1 puff into the lungs every 4 hours       Multiple Vitamins-Minerals (CENTRUM SILVER) per tablet Take 1 tablet by mouth daily              Condition on Discharge:  Discharge condition: Stable   Discharge vitals: Blood pressure 132/75, pulse 75, temperature 97  F (36.1  C), temperature source Oral, resp. rate 18, height 1.803 m (5' 11\"), weight 73.7 kg (162 lb 6.4 oz), SpO2 98 %.   Code status on discharge: Full Code     History of Present Illness:  See detailed admission note for full details.        Significant Physical Exam Findings Day of Discharge:  HEENT; Atraumatic, normocephalic, pinkish conjuctiva, pupils bilateral reactive   Skin: warm and moist, no rashes  Lymphatics: no cervical or axillary lymphandenopathy  Lungs: Fair air entry, no further crackles heard, very minimal wheezing on left lower lung field  Heart: normal rate, normal rhythm, no rubs or gallops.   Abdomen: normal bowel sounds, no tenderness, no peritoneal signs, no guarding  Extremities: no deformities, no edema   Neuro; follow commands, alert and oriented x3, spontaneous speech, coherent, moves all extremities spontaneously  Psych; no hallucination, euthymic mood, not agitated    Procedures other than Imaging:  None     Imaging:[AG1.1]  Results for orders placed or performed during the hospital encounter of 07/30/18   XR Chest 2 Views    Narrative    XR CHEST 2 VW 7/30/2018 3:14 PM    HISTORY: Respiratory Distress;       " Impression    IMPRESSION: Extensive patchy bilateral interstitial infiltrates.    LENORA WHATLEY MD   US Lower Extremity Venous Duplex Left    Narrative    VENOUS ULTRASOUND LEFT LEG  7/31/2018 6:27 PM     HISTORY: Asymptomatic edema, evaluate for clot in one with  acute-on-chronic hypoxic respiratory failure.     COMPARISON: None.    FINDINGS: Examination of the deep veins with graded compression and  color flow Doppler with spectral wave form analysis was performed.   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 There is partially occlusive deep venous thrombus in one  of these duplicated veins which extends into the popliteal vein.      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 This is partially occlusive.    CLARA BA MD   CT Chest Pulmonary Embolism w Contrast    Narrative    CT CHEST PULMONARY EMBOLISM WITH CONTRAST 7/31/2018 9:24 PM     HISTORY: DVT. Hypoxia, rule out PE.     CONTRAST DOSE:  64 mL Isovue-[id number]    Radiation dose for this scan was reduced using automated exposure  control, adjustment of the mA and/or kV according to patient size, or  iterative reconstruction technique.    FINDINGS:  There is a good contrast bolus within the pulmonary  arteries. There are no pulmonary arterial filling defects to indicate  pulmonary embolism. Contrast enhancement within the aorta is less  optimal. However, no aortic dissection is demonstrated. Nonenlarged  bilateral hilar lymph nodes are noted. Within the lateral segment of  the right middle lobe, there is a pleural-based 2.0 x 2.5 cm opacity  or mass. A smaller similar of pleural-based opacity or mass is also  noted laterally within the left lower lobe at the left lung base.  Miliary nodular pattern is noted throughout both lungs involving upper  and lower lobes. No pleural effusion or pneumothorax is  demonstrated.  Nonspecific 1.3 cm hypodensity is present within the right lobe of the  liver. Hepatic fatty infiltration is noted.      Impression    IMPRESSION:  1. No CT evidence of pulmonary embolism.  2. Right middle lobe lateral segment pleural-based 2.5 x 2.0 cm  ill-defined opacity which could represent neoplastic or inflammatory  disease. Similar pleural-based opacity is noted laterally in the left  lower lung near the lung base.  3. Miliary nodular opacities throughout both upper and lower lungs of  indeterminate etiology. Again, this could represent neoplastic versus  inflammatory disease.    SHENA KIRKPATRICK MD[AG1.3]        Consultations:  No consultations were requested during this admission.     Recent Lab Results:    Recent Labs  Lab 08/04/18  0551 08/03/18  0705 08/02/18  0630 08/01/18 0418   WBC  --  10.2 12.1* 16.2*   HGB  --  11.7* 12.2* 11.4*   HCT  --  37.7* 39.3* 37.1*   MCV  --  95 96 97    371 409 398       Recent Labs  Lab 07/31/18  0930 07/30/18  1539 07/30/18  1505   CULT Moderate growthNormal rohini No growth after 5 days No growth after 5 days       Recent Labs  Lab 08/02/18  0630 08/01/18  0418 07/31/18  0613 07/30/18  1505    141 142 140   POTASSIUM 4.5 4.7 4.8 5.0   CHLORIDE 103 106 109 104   CO2 33* 31 27 29   ANIONGAP 3 4 6 7   GLC 80 105* 134* 126*   BUN 22 34* 30 38*   CR 0.69 0.82 0.83 1.27*   GFRESTIMATED >90 >90 >90 55*   GFRESTBLACK >90 >90 >90 67   KATELYNN 8.7 8.6 8.4* 9.0   MAG  --   --  2.5*  --    PROTTOTAL  --   --   --  8.1   ALBUMIN  --   --   --  2.5*   BILITOTAL  --   --   --  0.8   ALKPHOS  --   --   --  133   AST  --   --   --  19   ALT  --   --   --  27       Recent Labs  Lab 08/02/18  0630 08/01/18  0418 07/31/18  0613 07/30/18  1505   GLC 80 105* 134* 126*       Recent Labs  Lab 07/30/18  1542   LACT 1.4       Recent Labs  Lab 07/30/18  2254 07/30/18  1922 07/30/18  1505   TROPI 0.189* 0.213* 0.256*     No results for input(s): COLOR, APPEARANCE, URINEGLC,  URINEBILI, URINEKETONE, SG, UBLD, URINEPH, PROTEIN, UROBILINOGEN, NITRITE, LEUKEST, RBCU, WBCU in the last 168 hours.       Pending Results:    Unresulted Labs Ordered in the Past 30 Days of this Admission     Date and Time Order Name Status Description    7/30/2018 1510 Blood culture ONE site Preliminary     7/30/2018 1506 Blood culture ONE site Preliminary            Discharge Instructions and Follow-Up:   Discharge diet:   Active Diet Order      Combination Diet Regular Diet Adult      Diet   Discharge activity: Activity as tolerated   Discharge follow-up: 1 week with PCP, repeat INR this coming Monday.   Outpatient therapy: None    Other instructions:  I spoke earlier with patient's primary care physician over the phone and discussed with the current diagnosis and plan of care here and the need for follow-up with recent diagnosis of DVT currently on anticoagulation and earlier CT scan findings that might need further imaging in the near future.     Hospital Course:  Bossman is currently doing well and back to his baseline home oxygen settings.  He feels a whole lot better with his breathing status as he can tolerate oral diet, ambulating in his room and denies any worsening symptoms of shortness of breath.  He denies any coughing spells no reported fever, tolerating diet with no nausea or vomiting.  No reported bleeding tendencies.  He is requesting for home discharge today and reassured me that he can likely finish the course of Lovenox can be given subcutaneously at least 2 more dosing.  His INR is on trending up at 1.7 and he received already 4 days of heparin/Lovenox while on warfarin.  He was ruled out for pulmonary embolism but will has multiple findings on his CT scan imaging of the chest.  It was felt likely that he has underlying infectious process as well provided with antibiotics receive IV 6 day course here and will be finishing an oral Augmentin in the next 2 more doses upon discharge.  He will likely  benefit for repeat imaging to monitor these findings and hopefully rule out any malignant processes.    Please refer to excerpts of my prior progress notes for other details of his hospitalization here as listed below.    Summary of Stay: Mahad Ward is a 72 year old male with a history of O2 dependent COPD at [facility], quit smoking 4 years ago, BPH s/p TURP 6/6/18, admitted on 7/30/2018 with productive cough and SOB and on presentation had e/o acute on chronic respiratory failure with sats in the upper 70 % range which promptly responded to supplemental O2.  Admission evaluation notable for extensive bilateral pulmonary infiltrates, elevated trop at 0.256, and BNP at 9K.  Although clinically it all seemed most consistent with with COPD with exacerbation 2/2 LRI presumed bacterial. There was no e/o sepsis.        He has been placed on steroids/nebs and empiric abx.  Echo on 7/31 significant for pulmonary hypertension and some e/o right heart strain, he was also noted to have asx le edema (which he reports as chronic) and hx of dvt in setting of trauma.  LLE doppler confirmed DVT and so initiated on heparin gtt.  CT PE negative for pulmonary emboli but + for some pleural based nodules      Overnight into 7/31 he had a brief episode of AF with RVR that spontaneously resolved      Problem List:   1. Acute on chronic hypoxic respiratory failure with mild CO2 retention: 2/2 acute copd with exacerbation.   2. Acute on chronic COPD with exacerbation-felt infectious-bacterial vs viral difficult to tell.  procal not particularly impressive at 0.6.  Would complete course of short course of abx (5 days of azith, 7 days ceftriaxone or equivalent).  Resp panel negative, strep urinary agn neg, sputum culture pending.  And for steroid burst 5-7 days, duonebs.    -Change IV Medrol to oral prednisone today.     3. LLE DVT:  Did have TURP last month and has been more immobile of late-initiated on heparin, await pharm liason input as  did poorly with warfarin in the past per his report. Presumably prostate cancer screening up to date in light of recent TURP-but should follow-up with primary MD, thinks he had a colonoscopy recently but unsure of the details-should also f/u with his primary for this.  Has some new pulmonary nodules which will need close f/u as well-see below.  Could be provoked but needs these other issues looked at.   -Start warfarin and assume it is a provoke DVT with recent surgery and hospitalization will need to be an anticoagulation for his DVT at least in the next 3 months however if he has recurrence of atrial fibrillation that might need a lifelong coverage with anticoagulation.  -Stop heparin infusion and change to Lovenox subcu with therapeutic dosing.  -Patient stated that he had no prior bleeding tendencies, he was on warfarin for short period of time several decades ago after he apparently had a blood clot on his lower extremity as he sustained a work-related injury at that time.     4. Pulmonary nodules: high risk-could be inflammatory/infectious vs neoplastic-I'd recommend repeat CT scan in 4 weeks to ensure resolution and if not then needs bx or PET.      5. Elevated troponin:  EKG without acute ST-T findings.  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    Earlier determined that likely not ACS related and will need further risk stratification as outpatient.  Given age/tob hx should get OP stress testing at some point. Would wait til current episode resolved  6. Grade 1 diastolic dysfunction by echo-I think he would benefit from trial of diuresis.  Given furosemide 20 mg IV x1 and  noted no improvement in respiratory status  7. Chronic O2 dependent COPD at 3 L/nc      DVT Prophylaxis: Enoxaparin (Lovenox) SQ     Total time spent in face to face contact with the patient and coordinating discharge was:  > 30 Minutes.[AG1.1]       Revision History         "User Key Date/Time User Provider Type Action    > AG1.2 8/4/2018 11:06 AM Stephane Perez MD Physician Sign     AG1.3 8/4/2018 11:02 AM Stephane Perez MD Physician      AG1.1 8/4/2018 10:56 AM Stephane Perez MD Physician                      Consult Notes      Consults by Sunshine De La Rosa RN at 7/31/2018  2:30 PM     Author:  Sunshine De La Rosa RN Service:  (none) Author Type:      Filed:  7/31/2018  2:30 PM Date of Service:  7/31/2018  2:30 PM Creation Time:  7/31/2018  2:23 PM    Status:  Signed :  Sunshine De La Rosa RN ()     Consult Orders:    1. Care Coordinator IP Consult [[id number]] ordered by Carla Gutiérrez MD at 07/31/18 1420     2. Care Coordinator IP Consult [[id number]] ordered by Michelle Yeh PA-C at 07/30/18 1810                Care Transition Initial Assessment - RN    Reason For Consult: care coordination/care conference, discharge planning   Met with: Patient.  DATA[SO1.1]   Active Problems:    COPD exacerbation (H)    Pneumonia[SO1.2]       Cognitive Status: awake, alert and oriented.  [facility]  Primary Care MD Name: González Cornelius  Contact information and PCP information verified: Yes  Lives With: spouse                    Insurance concerns: No Insurance issues identified  ASSESSMENT  Patient currently receives the following services:  Home O2- [facility]        Identified issues/concerns regarding health management: Patient admitted with COPD exacerbation. Patient has home O2. Patient receiving nebs and steroids in hospital. Patient does not have a neb machine at home. Given and reviewed COPD action sheet with patient. Patient states that activity is a trigger and states \"I know I should have been seen sooner as I was feeling tired all the time, but I thought it would just go away.\" Reinforced to monitor for COPD symptoms daily and utilize action plan for seeking medical care. Patient states will make own f/u " appointment.     PLAN  Financial costs for the patient: Not discussed.  Patient given options and choices for discharge Yes.  Patient/family is agreeable to the plan?  Yes.  Patient anticipates discharging to Home.        Patient anticipates needs for home equipment: Unkown, maybe neb machine  Plan/Disposition: Home   Appointments: Patient will make f/u PCP appointment.       Care  (CTS) will continue to follow as needed.    Sunshine LAM CTS[SO1.1]             Revision History        User Key Date/Time User Provider Type Action    > SO1.2 7/31/2018  2:30 PM Sunshine De La Rosa RN Case Manager Sign     SO1.1 7/31/2018  2:23 PM Sunshine De La Rosa RN Case Manager                      Progress Notes - Physician (Notes from 08/01/18 through 08/04/18)      Progress Notes by Jasbir Shah PT at 8/4/2018  9:26 AM     Author:  Jasbir Shah PT Service:  (none) Author Type:  Physical Therapist    Filed:  8/4/2018  9:26 AM Date of Service:  8/4/2018  9:26 AM Creation Time:  8/4/2018  9:26 AM    Status:  Signed :  Jasbir Shah PT (Physical Therapist)          08/04/18 0919   Quick Adds   Type of Visit Initial PT Evaluation   Living Environment   Lives With spouse   Living Arrangements mobile home   Home Accessibility stairs to enter home;tub/shower is not walk in   Number of Stairs to Enter Home 3   Number of Stairs Within Home 0   Stair Railings at Home outside, present on left side   Transportation Available family or friend will provide   Living Environment Comment Pt lives with spouse in mobile home, 3 stairs to enter, all needs met on main level, tub/shower, standard height toilet.    Self-Care   Dominant Hand right   Usual Activity Tolerance moderate   Current Activity Tolerance moderate   Regular Exercise no   Equipment Currently Used at Home none   Activity/Exercise/Self-Care Comment Indep PLOF   Functional Level Prior   Ambulation 0-->independent   Transferring 0-->independent   Toileting  "0-->independent   Bathing 0-->independent   Dressing 0-->independent   Eating 0-->independent   Communication 0-->understands/communicates without difficulty   Swallowing 0-->swallows foods/liquids without difficulty   Cognition 0 - no cognition issues reported   Fall history within last six months no   Which of the above functional risks had a recent onset or change? ambulation   Prior Functional Level Comment Pt states he can amb funtional grocery store distances with use of cart, has portable O2 concentrator up to 3Lpm he uses with activity, no O2 at rest.   General Information   Onset of Illness/Injury or Date of Surgery - Date 08/03/18   Referring Physician Stephane Perez MD   Patient/Family Goals Statement return home today or tomorrow   Pertinent History of Current Problem (include personal factors and/or comorbidities that impact the POC) Mahad Ward is a 72 year old male with a history of O2 dependent COPD at Counts include [facility] beds at the [facility], quit smoking 4 years ago, BPH s/p TURP 6/6/18, admitted on 7/30/2018 with productive cough and SOB and on presentation had e/o acute on chronic respiratory failure with sats in the upper 70 % range which promptly responded to supplemental O2.  Admission evaluation notable for extensive bilateral pulmonary infiltrates, elevated trop at 0.256, and BNP at 9K.  Although clinically it all seemed most consistent with with COPD with exacerbation 2/2 LRI presumed bacterial. There was no e/o sepsis.    Currently, pt reports feeling \"much better\".   Precautions/Limitations oxygen therapy device and L/min  (currently on 3Lpm O2)   General Observations 3Lpm O2 currently, SaO2 92%.   Cognitive Status Examination   Orientation orientation to person, place and time   Pain Assessment   Patient Currently in Pain No   Integumentary/Edema   Integumentary/Edema Comments mild generalized swelling B lower extremities, redness L lower leg   Posture    Posture Forward head position   Range of Motion (ROM) "   ROM Comment WFL BLEs to > 100 deg flexion, AROM shoulders overhead   Strength   Strength Comments 5/5 B knee ext, 4+/5 B hip flexion, 5/5 B ankle DF, WFL for mobility, generalized deconditioning assumed   Bed Mobility   Bed Mobility Comments Indep supine > sit EOB with HOB elevated   Transfer Skills   Transfer Comments SBA/indep sit <> stand transfers, good functional standing balance, no UE support needed   Gait   Gait Comments Amb with reciprocal gait pattern, no AD, mild imbalance at times but no LOB, SOB reported, on 3Lpm O2   Balance   Balance Comments [place] EO x 30 sec steady, EC x 10 sec mild sway, mostly steady gait with mild dynamic gait impairement, no overt LOB, no AD, low fall risk.   Sensory Examination   Sensory Perception Comments baseline mild numbness in feet   Coordination   Coordination no deficits were identified   Muscle Tone   Muscle Tone no deficits were identified   General Therapy Interventions   Planned Therapy Interventions gait training;risk factor education;home program guidelines;progressive activity/exercise;strengthening   Clinical Impression   Criteria for Skilled Therapeutic Intervention yes, treatment indicated   PT Diagnosis Impaired functional activity tolerance and endurance   Influenced by the following impairments O2 needs, deconditioning, mild swelling LEs, SOB with activity   Functional limitations due to impairments Impaired tolerance with functional amb distances, SOB/increased O2 needs with activity   Clinical Presentation Stable/Uncomplicated   Clinical Presentation Rationale improving status, indep PLOF, current O2 needs   Clinical Decision Making (Complexity) Low complexity   Therapy Frequency` daily   Predicted Duration of Therapy Intervention (days/wks) 1 day   Anticipated Equipment Needs at Discharge (none)   Anticipated Discharge Disposition Home   Risk & Benefits of therapy have been explained Yes   Patient, Family & other staff in agreement with plan of care  "Yes   Clinical Impression Comments will benefit from 1 PT session to education/train on proper activity guidelines/progressive walking/exercise program.   [facility] AM-PAC TM \"6 Clicks\"   2016, Trustees of [facility], under license to [employer].  All rights reserved.   6 Clicks Short Forms Basic Mobility Inpatient Short Form   [facility] AM-PAC  \"6 Clicks\" V.2 Basic Mobility Inpatient Short Form   1. Turning from your back to your side while in a flat bed without using bedrails? 4 - None   2. Moving from lying on your back to sitting on the side of a flat bed without using bedrails? 4 - None   3. Moving to and from a bed to a chair (including a wheelchair)? 4 - None   4. Standing up from a chair using your arms (e.g., wheelchair, or bedside chair)? 4 - None   5. To walk in hospital room? 3 - A Little   6. Climbing 3-5 steps with a railing? 3 - A Little   Basic Mobility Raw Score (Score out of 24.Lower scores equate to lower levels of function) 22   Total Evaluation Time   Total Evaluation Time (Minutes) 15[CS1.1]        Revision History        User Key Date/Time User Provider Type Action    > CS1.1 8/4/2018  9:26 AM Jasbir Shah, PT Physical Therapist Sign            Progress Notes by Norma Rojas OT at 8/3/2018  4:02 PM     Author:  Norma Rojas OT Service:  (none) Author Type:  Occupational Therapist    Filed:  8/3/2018  4:02 PM Date of Service:  8/3/2018  4:02 PM Creation Time:  8/3/2018  4:02 PM    Status:  Signed :  Norma Rojas OT (Occupational Therapist)          08/03/18 1527   Quick Adds   Type of Visit Initial Occupational Therapy Evaluation   Living Environment   Lives With spouse   Living Arrangements mobile home   Home Accessibility stairs to enter home;tub/shower is not walk in   Number of Stairs to Enter Home 3   Number of Stairs Within Home 0   Transportation Available car;family or friend will provide   Living Environment Comment Pt lives with spouse in " mobile home, 3 stairs to enter, all needs met on main level, tub/shower, standard height toilet.    Self-Care   Dominant Hand right   Usual Activity Tolerance moderate   Current Activity Tolerance moderate   Regular Exercise no   Equipment Currently Used at Home none   Functional Level Prior   Ambulation 0-->independent   Transferring 0-->independent   Toileting 0-->independent   Bathing 0-->independent   Dressing 0-->independent   Eating 0-->independent   Communication 0-->understands/communicates without difficulty   Swallowing 0-->swallows foods/liquids without difficulty   Cognition 0 - no cognition issues reported   Fall history within last six months no   Prior Functional Level Comment Pt reports indep in all ADLS, IADLs and mobility tasks with no AD at baseline. Shares household tasks with spouse.    General Information   Onset of Illness/Injury or Date of Surgery - Date 07/30/18   Referring Physician Stephane Perez MD   Patient/Family Goals Statement Pt's goal is to d/c home   Additional Occupational Profile Info/Pertinent History of Current Problem Per chart: Pt is a 72 year old male with a history of O2 dependent COPD at 3/NC, quit smoking 4 years ago, BPH s/p TURP 6/6/18, admitted on 7/30/2018 with productive cough and SOB and on presentation had e/o acute on chronic respiratory failure with sats in the upper 70 % range which promptly responded to supplemental O2.  Admission evaluation notable for extensive bilateral pulmonary infiltrates, elevated trop at 0.256, and BNP at 9K.  Although clinically it all seemed most consistent with with COPD with exacerbation 2/2 LRI presumed bacterial.   Precautions/Limitations oxygen therapy device and L/min   Cognitive Status Examination   Orientation orientation to person, place and time   Level of Consciousness alert   Able to Follow Commands WNL/WFL   Personal Safety (Cognitive) mild impairment   Memory impaired   Visual Perception   Visual Perception No  deficits were identified   Sensory Examination   Sensory Comments Pt denies numbness/tingling in BUEs   Pain Assessment   Patient Currently in Pain No   Range of Motion (ROM)   ROM Comment WFL   Strength   Strength Comments BUE MMT: 4+/5   Hand Strength   Hand Strength Comments WFL   Coordination   Upper Extremity Coordination No deficits were identified   Mobility   Bed Mobility Comments Initiated - refer to OT daily note for details.    Bed Mobility Skill: Sit to Supine   Level of [place]: Sit/Supine stand-by assist   Physical Assist/Nonphysical Assist: Sit/Supine 1 person assist   Bed Mobility Skill: Supine to Sit   Level of [place]: Supine/Sit stand-by assist   Physical Assist/Nonphysical Assist: Supine/Sit 1 person assist   Transfer Skills   Transfer Comments Initiated - refer to OT daily note for details   Transfer Skill: Bed to Chair/Chair to Bed   Level of [place]: Bed to Chair independent   Transfer Skill: Sit to Stand   Level of [place]: Sit/Stand independent   Transfer Skill: Toilet Transfer   Level of [place]: Toilet independent   Balance   Balance Comments No balance deficits identified   Upper Body Dressing   Level of [place]: Dress Upper Body stand-by assist   Physical Assist/Nonphysical Assist: Dress Upper Body 1 person assist   Lower Body Dressing   Level of [place]: Dress Lower Body stand-by assist   Physical Assist/Nonphysical Assist: Dress Lower Body 1 person assist   Toileting   Level of [place]: Toilet stand-by assist   Physical Assist/Nonphysical Assist: Toilet 1 person assist   Grooming   Level of [place]: Grooming stand-by assist   Physical Assist/Nonphysical Assist: Grooming 1 person assist   Instrumental Activities of Daily Living (IADL)   Previous Responsibilities laundry;housekeeping;yardwork;medication management;driving   IADL Comments Pt has support of spouse for IADLs as needed upon return home   Activities of Daily Living Analysis  "  Impairments Contributing to Impaired Activities of Daily Living (O2 use)   ADL Comments Pt mildly below baseline level of functioning in ADLs, limited by decreased activity tolerance   General Therapy Interventions   Planned Therapy Interventions ADL retraining;IADL retraining;strengthening   Clinical Impression   Criteria for Skilled Therapeutic Interventions Met yes, treatment indicated   OT Diagnosis Impaired ADLs, IADLs and mobility   Influenced by the following impairments Decreased functional activity tolerance, O2 need, SOB   Assessment of Occupational Performance 5 or more Performance Deficits   Identified Performance Deficits Bathing, dressing, grooming, toileting, homemaking, transfers   Clinical Decision Making (Complexity) Low complexity   Therapy Frequency daily   Predicted Duration of Therapy Intervention (days/wks) 3 days   Anticipated Discharge Disposition Home with Assist   Risks and Benefits of Treatment have been explained. Yes   Patient, Family & other staff in agreement with plan of care Yes   Clinical Impression Comments Pt would benefit from skilled OT to maximize safety and indep in all ADLs, IADLs and mobility tasks due to current deficits impacting function    [facility] AM-PAC  \"6 Clicks\" Daily Activity Inpatient Short Form   1. Putting on and taking off regular lower body clothing? 3 - A Little   2. Bathing (including washing, rinsing, drying)? 3 - A Little   3. Toileting, which includes using toilet, bedpan or urinal? 3 - A Little   4. Putting on and taking off regular upper body clothing? 3 - A Little   5. Taking care of personal grooming such as brushing teeth? 4 - None   6. Eating meals? 4 - None   Daily Activity Raw Score (Score out of 24.Lower scores equate to lower levels of function) 20   Total Evaluation Time   Total Evaluation Time (Minutes) 10[JL1.1]        Revision History        User Key Date/Time User Provider Type Action    > JL1.1 8/3/2018  4:02 PM Norma Rojas, " OT Occupational Therapist Sign            Progress Notes by Stephane Perez MD at 8/3/2018  9:49 AM     Author:  Stephane Perez MD Service:  Hospitalist Author Type:  Physician    Filed:  8/3/2018  9:53 AM Date of Service:  8/3/2018  9:49 AM Creation Time:  8/3/2018  9:49 AM    Status:  Signed :  Stephane Perez MD (Physician)         [facility]  Hospitalist Progress Note[AG1.1]  Stephane Perez MD, MD 08/03/2018[AG1.2]  (Text Page)  Reason for Stay (Diagnosis): Multifactorial shortness of breath         Assessment and Plan:        Summary of Stay: Mahad Ward is a 72 year old male with a history of O2 dependent COPD at [facility], quit smoking 4 years ago, BPH s/p TURP 6/6/18, admitted on 7/30/2018 with productive cough and SOB and on presentation had e/o acute on chronic respiratory failure with sats in the upper 70 % range which promptly responded to supplemental O2.  Admission evaluation notable for extensive bilateral pulmonary infiltrates, elevated trop at 0.256, and BNP at 9K.  Although clinically it all seemed most consistent with with COPD with exacerbation 2/2 LRI presumed bacterial. There was no e/o sepsis.       He has been placed on steroids/nebs and empiric abx.  Echo on 7/31 significant for pulmonary hypertension and some e/o right heart strain, he was also noted to have asx le edema (which he reports as chronic) and hx of dvt in setting of trauma.  LLE doppler confirmed DVT and so initiated on heparin gtt.  CT PE negative for pulmonary emboli but + for some pleural based nodules     Overnight into 7/31 he had a brief episode of AF with RVR that spontaneously resolved     Problem List:   1. Acute on chronic hypoxic respiratory failure with mild CO2 retention: 2/2 acute copd with exacerbation.   2. Acute on chronic COPD with exacerbation-felt infectious-bacterial vs viral difficult to tell.  procal not particularly impressive at 0.6.  Would complete course of  short course of abx (5 days of azith, 7 days ceftriaxone or equivalent).  Resp panel negative, strep urinary agn neg, sputum culture pending.  And for steroid burst 5-7 days, fernanda.    -Change IV Medrol to oral prednisone today.    3. LLE DVT:  Did have TURP last month and has been more immobile of late-initiated on heparin, await pharm liason input as did poorly with warfarin in the past per his report. Presumably prostate cancer screening up to date in light of recent TURP-but should follow-up with primary MD, thinks he had a colonoscopy recently but unsure of the details-should also f/u with his primary for this.  Has some new pulmonary nodules which will need close f/u as well-see below.  Could be provoked but needs these other issues looked at.   -Start warfarin and assume it is a provoke DVT with recent surgery and hospitalization will need to be an anticoagulation for his DVT at least in the next 3 months however if he has recurrence of atrial fibrillation that might need a lifelong coverage with anticoagulation.  -Stop heparin infusion and change to Lovenox subcu with therapeutic dosing.  -Patient stated that he had no prior bleeding tendencies, he was on warfarin for short period of time several decades ago after he apparently had a blood clot on his lower extremity as he sustained a work-related injury at that time.    4. Pulmonary nodules: high risk-could be inflammatory/infectious vs neoplastic-I'd recommend repeat CT scan in 4 weeks to ensure resolution and if not then needs bx or PET.     5. Elevated troponin:  EKG without acute ST-T findings.  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    Earlier determined that likely not ACS related and will need further risk stratification as outpatient.  Given age/tob hx should get OP stress testing at some point. Would wait til current episode resolved  6. Grade 1 diastolic dysfunction by echo-I  "think he would benefit from trial of diuresis.  Given furosemide 20 mg IV x1 and  noted no improvement in respiratory status  7. Chronic O2 dependent COPD at 3 L/nc     DVT Prophylaxis: Enoxaparin (Lovenox) SQ  Code Status: Full Code  Functional:  Lives with wife in a house, independent.   Grandson is also staying        Disposition Plan     Expected discharge in 1-2 days to prior living arrangement once respiratory status stable.        Interval History (Subjective):      Continuing care today.  Seen and examined.  Chart reviewed.  Case discussed with nursing service.  Bossman appears to be in better spirits as he is able to sleep for several hours last night as his coughing spells as improve.  He feels that his breathing and shortness of breath are also improving.  Remain afebrile.  Tolerating oral diet.  No bleeding tendencies reported.              Physical Exam:      Last Vital Signs:[AG1.1]  /79 (BP Location: Right arm)  Pulse 83  Temp 96.2  F (35.7  C) (Oral)  Resp 20  Ht 1.803 m (5' 11\")  Wt 73.7 kg (162 lb 6.4 oz)  SpO2 95%  BMI 22.65 kg/m2    I/O last 3 completed shifts:  In: 150 [P.O.:150]  Out: 2000 [Urine:2000]  Wt Readings from Last 1 Encounters:   08/03/18 73.7 kg (162 lb 6.4 oz)     Vitals:    07/30/18 1449 07/30/18 1850 08/01/18 1135 08/02/18 0652   Weight: 81.6 kg (180 lb) 75.8 kg (167 lb) 76.3 kg (168 lb 3.2 oz) 75.3 kg (166 lb)    08/03/18 0600   Weight: 73.7 kg (162 lb 6.4 oz)[AG1.2]       Constitutional: Awake, alert, cooperative, no apparent distress   Respiratory:  Fair air entry, minimal scant wheezing mostly in the left lung fields, bilateral fine inspiratory crackles   Cardiovascular: Regular rate and rhythm, normal S1 and S2, and no murmur noted   Abdomen: Normal bowel sounds, soft, non-distended, non-tender   Skin: No rashes, no cyanosis, dry to touch   Neuro: Alert and oriented x3, no weakness, spontaneous and coherent speech   Extremities:  +1 pitting edema on both lower " extremities, normal range of motion   Other(s): Euthymic mood, not agitated       All other systems: Negative          Medications:      All current medications were reviewed with changes reflected in problem list.         Data:      All new lab and imaging data was reviewed.   Labs:[AG1.1]    Recent Labs  Lab 07/31/18  0930 07/30/18  1539 07/30/18  1505   CULT Moderate growthNormal rohini No growth after 4 days No growth after 4 days       Recent Labs  Lab 08/02/18  0630 08/01/18  0418 07/31/18  0613    141 142   POTASSIUM 4.5 4.7 4.8   CHLORIDE 103 106 109   CO2 33* 31 27   ANIONGAP 3 4 6   GLC 80 105* 134*   BUN 22 34* 30   CR 0.69 0.82 0.83   GFRESTIMATED >90 >90 >90   GFRESTBLACK >90 >90 >90   KATELYNN 8.7 8.6 8.4*       Recent Labs  Lab 08/03/18  0705 08/02/18  0630 08/01/18  0418   WBC 10.2 12.1* 16.2*   HGB 11.7* 12.2* 11.4*   HCT 37.7* 39.3* 37.1*   MCV 95 96 97    409 398       Recent Labs  Lab 08/02/18  0630 08/01/18  0418 07/31/18  0613 07/30/18  1505   GLC 80 105* 134* 126*       Recent Labs  Lab 08/03/18  0705 08/02/18  0630   INR 1.32* 1.12       Recent Labs  Lab 07/30/18  2254 07/30/18  1922 07/30/18  1505   TROPI 0.189* 0.213* 0.256*     No results for input(s): COLOR, APPEARANCE, URINEGLC, URINEBILI, URINEKETONE, SG, UBLD, URINEPH, PROTEIN, UROBILINOGEN, NITRITE, LEUKEST, RBCU, WBCU in the last 168 hours.[AG1.2]   Imaging:   Recent Results (from the past 48 hour(s))   US Lower Extremity Venous Duplex Left    Narrative    VENOUS ULTRASOUND LEFT LEG  7/31/2018 6:27 PM     HISTORY: Asymptomatic edema, evaluate for clot in one with  acute-on-chronic hypoxic respiratory failure.     COMPARISON: None.    FINDINGS: Examination of the deep veins with graded compression and  color flow Doppler with spectral wave form analysis was performed.   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 There is partially occlusive deep venous thrombus in one  of these  duplicated veins which extends into the popliteal vein.      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 This is partially occlusive.    CLARA BA MD   CT Chest Pulmonary Embolism w Contrast    Narrative    CT CHEST PULMONARY EMBOLISM WITH CONTRAST 7/31/2018 9:24 PM     HISTORY: DVT. Hypoxia, rule out PE.     CONTRAST DOSE:  64 mL Isovue-370    Radiation dose for this scan was reduced using automated exposure  control, adjustment of the mA and/or kV according to patient size, or  iterative reconstruction technique.    FINDINGS:  There is a good contrast bolus within the pulmonary  arteries. There are no pulmonary arterial filling defects to indicate  pulmonary embolism. Contrast enhancement within the aorta is less  optimal. However, no aortic dissection is demonstrated. Nonenlarged  bilateral hilar lymph nodes are noted. Within the lateral segment of  the right middle lobe, there is a pleural-based 2.0 x 2.5 cm opacity  or mass. A smaller similar of pleural-based opacity or mass is also  noted laterally within the left lower lobe at the left lung base.  Miliary nodular pattern is noted throughout both lungs involving upper  and lower lobes. No pleural effusion or pneumothorax is demonstrated.  Nonspecific 1.3 cm hypodensity is present within the right lobe of the  liver. Hepatic fatty infiltration is noted.      Impression    IMPRESSION:  1. No CT evidence of pulmonary embolism.  2. Right middle lobe lateral segment pleural-based 2.5 x 2.0 cm  ill-defined opacity which could represent neoplastic or inflammatory  disease. Similar pleural-based opacity is noted laterally in the left  lower lung near the lung base.  3. Miliary nodular opacities throughout both upper and lower lungs of  indeterminate etiology. Again, this could represent neoplastic versus  inflammatory disease.    SHENA KIRKPATRICK MD[AG1.1]           Revision History        User Key Date/Time User Provider Type Action    > AG1.2 8/3/2018  9:53 AM Stephane Perez MD Physician Sign     AG1.1 8/3/2018  9:49 AM Stephane Perez MD Physician             Progress Notes by Carla Gutiérrez MD at 8/1/2018  6:04 PM     Author:  Carla Gutiérrez MD Service:  Hospitalist Author Type:  Physician    Filed:  8/2/2018  5:27 PM Date of Service:  8/1/2018  6:04 PM Creation Time:  8/1/2018  6:04 PM    Status:  Addendum :  Carla Gutiérrez MD (Physician)         [facility]  Hospitalist Progress Note  Carla Gutiérrez MD 08/01/2018    Reason for Stay (Diagnosis): SOB         Assessment and Plan:      Summary of Stay: Mahad Ward is a 72 year old male with a history of O2 dependent COPD at [facility], quit smoking 4 years ago, BPH s/p TURP 6/6/18, admitted on 7/30/2018 with productive cough and SOB and on presentation had e/o acute on chronic respiratory failure with sats in the upper 70 % range which promptly responded to supplemental O2.  Admission evaluation notable for extensive bilateral pulmonary infiltrates, elevated trop at 0.256, and BNP at 9K.  Although clinically it all seemed most consistent with with COPD with exacerbation 2/2 LRI presumed bacterial. There was no e/o sepsis.      He has been placed on steroids/nebs and empiric abx.  Echo on 7/31 significant for pulmonary hypertension and some e/o right heart strain, he was also noted to have asx le edema (which he reports as chronic) and hx of dvt in setting of trauma.  LLE doppler confirmed DVT and so initiated on heparin gtt.  CT PE negative for pulmonary emboli but + for some pleural based nodules    Overnight into 7/31 he had a brief episode of AF with RVR that spontaneously resolved    Problem List:   1. Acute on chronic hypoxic respiratory failure with mild CO2 retention: 2/2 acute copd with exacerbation.   2. Acute on chronic COPD with exacerbation-felt infectious-bacterial vs viral  difficult to tell.  procal not particularly impressive at 0.6.  Would complete course of short course of abx (5 days of azith, 5 days ceftriaxone or equivalent).  Resp panel negative, strep urinary agn neg, sputum culture pending. Would do steroid burst 5-7 days, duonebs.  More wheezy today as is refusing nebs  3. LLE DVT:  Did have TURP last month and has been more immobile of late-initiated on heparin, await pharm liason input as did poorly with warfarin in the past per his report. Presumably prostate cancer screening up to date in light of recent TURP-but should dbl check with primary MD, thinks he had a colonoscopy recently but unsure of the details-should also f/u with his primary for this.  Has some new pulmonary nodules which will need close f/u as well-see below.  Could be provoked but needs these other issues looked at.   4. Pulmonary nodules: high risk-could be inflammatory/infectious vs neoplastic-I'd recommend repeat CT scan in 4 weeks to ensure resolution and if not then needs bx or PET.   5. Elevated troponin[KD1.1] 2/2 demand ischemia[KD1.2]:  EKG without acute ST-T findings.  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   Given age/tob hx should get OP stress testing at some point. Would wait til current episode resolved  6. G1 dd by echo-I think he would benefit from trial of diuresis.  Given furosemide 20 mg IV x1 and I note no improvement in respiratory status  7. Chronic O2 dependent COPD at 3 L/nc    DVT Prophylaxis: Enoxaparin (Lovenox) SQ  Code Status: Full Code  Functional:  Lives with wife in a house, independent.   Grandson is also staying    Disposition Plan   Expected discharge in 2 days to prior living arrangement once respiratory status stable.     Entered: Carlarenato Gutiérrez 08/01/2018, 6:04 PM               Interval History (Subjective):      Thinks he's doing ok although clearly looks more sob to me today. Denies any pain.  No  "n/v/d.  No cp.                    Physical Exam:      Last Vital Signs:  /70 (BP Location: Right arm)  Pulse 87  Temp 96.3  F (35.7  C) (Oral)  Resp 20  Ht 1.803 m (5' 11\")  Wt 76.3 kg (168 lb 3.2 oz)  SpO2 96%  BMI 23.46 kg/m2    I/O:up 1.6 L  Weight- unclear  Tele NSR    Pleasant nad looks stated age thin head nc/at sclera clear lungs diffusely diminished air movement but also with tight sounding wheezes in b lung fields  rrr-also distant no mrg 1 + le edema on the right, 2 + on the left           Medications:      All current medications were reviewed with changes reflected in problem list.         Data:      All new lab and imaging data was reviewed.   Labs:    Recent Labs  Lab 08/01/18  0418      POTASSIUM 4.7   CHLORIDE 106   CO2 31   ANIONGAP 4   *   BUN 34*   CR 0.82   GFRESTIMATED >90   GFRESTBLACK >90   KATELYNN 8.6       Recent Labs  Lab 08/01/18  0418   WBC 16.2*   HGB 11.4*   HCT 37.1*   MCV 97         Imaging:    Echo:  7/31/18   The visual ejection fraction is estimated at 50-55%.  Grade I or early diastolic dysfunction.  Abnormal \"septal bounce\" due to right heart disease/pulm HTN  The right ventricle is mild to moderately dilated.  Moderately decreased right ventricular systolic function  The right atrium is mildly dilated.  There is no color Doppler evidence of an atrial shunt.  Right ventricular systolic pressure could not be approximated due to  inadequate tricuspid regurgitation.  Dilated IVC (>2.5cm) with no respiratory collapse; right atrial pressure is  estimated at >20mmHg.  Borderline aortic root dilatation.  Study most likely c/w acute/chronic cor pulmonale[KD1.1]       Revision History        User Key Date/Time User Provider Type Action    > KD1.2 8/2/2018  5:27 PM Carla Gutiérrez MD Physician Addend     KD1.1 8/1/2018  6:12 PM Carla Gutiérrez MD Physician Sign            Progress Notes by Stephane Perez MD at 8/2/2018 10:23 AM     Author:  Stephane Perez " MD Jose Raul Service:  Hospitalist Author Type:  Physician    Filed:  8/2/2018 10:30 AM Date of Service:  8/2/2018 10:23 AM Creation Time:  8/2/2018 10:23 AM    Status:  Signed :  Stephane Perez MD (Physician)         [facility]  Hospitalist Progress Note[AG1.1]  Stephane Perez MD, MD 08/02/2018[AG1.2]  (Text Page)  Reason for Stay (Diagnosis): Multifactorial shortness of breath         Assessment and Plan:        Summary of Stay: Mahad Ward is a 72 year old male with a history of O2 dependent COPD at 3/[facility], quit smoking 4 years ago, BPH s/p TURP 6/6/18, admitted on 7/30/2018 with productive cough and SOB and on presentation had e/o acute on chronic respiratory failure with sats in the upper 70 % range which promptly responded to supplemental O2.  Admission evaluation notable for extensive bilateral pulmonary infiltrates, elevated trop at 0.256, and BNP at 9K.  Although clinically it all seemed most consistent with with COPD with exacerbation 2/2 LRI presumed bacterial. There was no e/o sepsis.       He has been placed on steroids/nebs and empiric abx.  Echo on 7/31 significant for pulmonary hypertension and some e/o right heart strain, he was also noted to have asx le edema (which he reports as chronic) and hx of dvt in setting of trauma.  LLE doppler confirmed DVT and so initiated on heparin gtt.  CT PE negative for pulmonary emboli but + for some pleural based nodules     Overnight into 7/31 he had a brief episode of AF with RVR that spontaneously resolved     Problem List:   1. Acute on chronic hypoxic respiratory failure with mild CO2 retention: 2/2 acute copd with exacerbation.   2. Acute on chronic COPD with exacerbation-felt infectious-bacterial vs viral difficult to tell.  procal not particularly impressive at 0.6.  Would complete course of short course of abx (5 days of azith, 5 days ceftriaxone or equivalent).  Resp panel negative, strep urinary agn neg, sputum culture  pending.  And for steroid burst 5-7 days, fernanda.    3. LLE DVT:  Did have TURP last month and has been more immobile of late-initiated on heparin, await pharm liason input as did poorly with warfarin in the past per his report. Presumably prostate cancer screening up to date in light of recent TURP-but should follow-up with primary MD, thinks he had a colonoscopy recently but unsure of the details-should also f/u with his primary for this.  Has some new pulmonary nodules which will need close f/u as well-see below.  Could be provoked but needs these other issues looked at.   -Start warfarin and assume it is a provoke DVT with recent surgery and hospitalization will need to be an anticoagulation for his DVT at least in the next 3 months however if he has recurrence of atrial fibrillation that might need a lifelong coverage with anticoagulation.  -Patient stated that he had no prior bleeding tendencies, he was on warfarin for short period of time several decades ago after he apparently had a blood clot on his lower extremity as he sustained a work-related injury at that time.  4. Pulmonary nodules: high risk-could be inflammatory/infectious vs neoplastic-I'd recommend repeat CT scan in 4 weeks to ensure resolution and if not then needs bx or PET.   5. Elevated troponin:  EKG without acute ST-T findings.  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    Earlier determined that likely not ACS related and will need further risk stratification as outpatient.  Given age/tob hx should get OP stress testing at some point. Would wait til current episode resolved  6. Grade 1 diastolic dysfunction by echo-I think he would benefit from trial of diuresis.  Given furosemide 20 mg IV x1 and  noted no improvement in respiratory status  7. Chronic O2 dependent COPD at 3 L/nc     DVT Prophylaxis: Enoxaparin (Lovenox) SQ  Code Status: Full Code  Functional:  Lives with wife  "in a house, independent.   Grandson is also staying        Disposition Plan     Expected discharge in 2 days to prior living arrangement once respiratory status stable.        Interval History (Subjective):      I have assumed care today.  Seen and examined.  Chart reviewed.  Case discussed with nursing service.  Bossman is found sitting on the hospital bed and not in any form of distress.  He denies any chest pain, shortness of breath, nausea, vomiting he thinks he is feeling a little better with earlier shortness of breath.  He is tolerating oral diet with no reported complaints of nausea, vomiting or diarrhea.  Remain afebrile.  On oxygen supplementation.              Physical Exam:      Last Vital Signs:[AG1.1]  /70 (BP Location: Right arm)  Pulse 67  Temp 97.4  F (36.3  C) (Oral)  Resp 18  Ht 1.803 m (5' 11\")  Wt 75.3 kg (166 lb)  SpO2 95%  BMI 23.15 kg/m2    I/O last 3 completed shifts:  In: 1258.1 [P.O.:1180; I.V.:78.1]  Out: 1525 [Urine:1525]  Wt Readings from Last 1 Encounters:   08/02/18 75.3 kg (166 lb)     Vitals:    07/30/18 1449 07/30/18 1850 08/01/18 1135 08/02/18 0652   Weight: 81.6 kg (180 lb) 75.8 kg (167 lb) 76.3 kg (168 lb 3.2 oz) 75.3 kg (166 lb)[AG1.2]       Constitutional: Awake, alert, cooperative, no apparent distress   Respiratory:  Fair air entry, minimal scant wheezing, bilateral fine inspiratory crackles   Cardiovascular: Regular rate and rhythm, normal S1 and S2, and no murmur noted   Abdomen: Normal bowel sounds, soft, non-distended, non-tender   Skin: No rashes, no cyanosis, dry to touch   Neuro: Alert and oriented x3, no weakness, spontaneous and coherent speech   Extremities:  +1 pitting edema on both lower extremities, normal range of motion   Other(s): Euthymic mood, not agitated       All other systems: Negative          Medications:      All current medications were reviewed with changes reflected in problem list.         Data:      All new lab and imaging data was " reviewed.   Labs:[AG1.1]    Recent Labs  Lab 07/31/18  0930 07/30/18  1539 07/30/18  1505   CULT Culture in progress No growth after 3 days No growth after 3 days       Recent Labs  Lab 08/02/18  0630 08/01/18  0418 07/31/18  0613    141 142   POTASSIUM 4.5 4.7 4.8   CHLORIDE 103 106 109   CO2 33* 31 27   ANIONGAP 3 4 6   GLC 80 105* 134*   BUN 22 34* 30   CR 0.69 0.82 0.83   GFRESTIMATED >90 >90 >90   GFRESTBLACK >90 >90 >90   KATELYNN 8.7 8.6 8.4*       Recent Labs  Lab 08/02/18  0630 08/01/18  0418 07/31/18  0613   WBC 12.1* 16.2* 13.7*   HGB 12.2* 11.4* 11.9*   HCT 39.3* 37.1* 38.7*   MCV 96 97 97    398 377       Recent Labs  Lab 08/02/18  0630 08/01/18  0418 07/31/18  0613 07/30/18  1505   GLC 80 105* 134* 126*     No results for input(s): INR in the last 168 hours.    Recent Labs  Lab 07/30/18  2254 07/30/18  1922 07/30/18  1505   TROPI 0.189* 0.213* 0.256*     No results for input(s): COLOR, APPEARANCE, URINEGLC, URINEBILI, URINEKETONE, SG, UBLD, URINEPH, PROTEIN, UROBILINOGEN, NITRITE, LEUKEST, RBCU, WBCU in the last 168 hours.[AG1.3]   Imaging:[AG1.1]   Recent Results (from the past 48 hour(s))   US Lower Extremity Venous Duplex Left    Narrative    VENOUS ULTRASOUND LEFT LEG  7/31/2018 6:27 PM     HISTORY: Asymptomatic edema, evaluate for clot in one with  acute-on-chronic hypoxic respiratory failure.     COMPARISON: None.    FINDINGS: Examination of the deep veins with graded compression and  color flow Doppler with spectral wave form analysis was performed.   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 There is partially occlusive deep venous thrombus in one  of these duplicated veins which extends into the popliteal vein.      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 This is partially occlusive.    CLARA BA MD   CT Chest Pulmonary  Embolism w Contrast    Narrative    CT CHEST PULMONARY EMBOLISM WITH CONTRAST 7/31/2018 9:24 PM     HISTORY: DVT. Hypoxia, rule out PE.     CONTRAST DOSE:  64 mL Isovue-370    Radiation dose for this scan was reduced using automated exposure  control, adjustment of the mA and/or kV according to patient size, or  iterative reconstruction technique.    FINDINGS:  There is a good contrast bolus within the pulmonary  arteries. There are no pulmonary arterial filling defects to indicate  pulmonary embolism. Contrast enhancement within the aorta is less  optimal. However, no aortic dissection is demonstrated. Nonenlarged  bilateral hilar lymph nodes are noted. Within the lateral segment of  the right middle lobe, there is a pleural-based 2.0 x 2.5 cm opacity  or mass. A smaller similar of pleural-based opacity or mass is also  noted laterally within the left lower lobe at the left lung base.  Miliary nodular pattern is noted throughout both lungs involving upper  and lower lobes. No pleural effusion or pneumothorax is demonstrated.  Nonspecific 1.3 cm hypodensity is present within the right lobe of the  liver. Hepatic fatty infiltration is noted.      Impression    IMPRESSION:  1. No CT evidence of pulmonary embolism.  2. Right middle lobe lateral segment pleural-based 2.5 x 2.0 cm  ill-defined opacity which could represent neoplastic or inflammatory  disease. Similar pleural-based opacity is noted laterally in the left  lower lung near the lung base.  3. Miliary nodular opacities throughout both upper and lower lungs of  indeterminate etiology. Again, this could represent neoplastic versus  inflammatory disease.    SHENA KIRKPATRICK MD[AG1.4]          Revision History        User Key Date/Time User Provider Type Action    > AG1.4 8/2/2018 10:30 AM Stephane Perez MD Physician Sign     AG1.3 8/2/2018 10:25 AM Stephane Perez MD Physician      AG1.2 8/2/2018 10:24 AM Stephane Perez MD Physician      AG1.1  8/2/2018 10:23 AM Stephane Perez MD Physician                   Procedure Notes     No notes of this type exist for this encounter.         Progress Notes - Therapies (Notes from 08/01/18 through 08/04/18)      Progress Notes by Jasbir Shah PT at 8/4/2018  9:26 AM     Author:  Jasbir Shah PT Service:  (none) Author Type:  Physical Therapist    Filed:  8/4/2018  9:26 AM Date of Service:  8/4/2018  9:26 AM Creation Time:  8/4/2018  9:26 AM    Status:  Signed :  Jasbir Shah PT (Physical Therapist)          08/04/18 0919   Quick Adds   Type of Visit Initial PT Evaluation   Living Environment   Lives With spouse   Living Arrangements mobile home   Home Accessibility stairs to enter home;tub/shower is not walk in   Number of Stairs to Enter Home 3   Number of Stairs Within Home 0   Stair Railings at Home outside, present on left side   Transportation Available family or friend will provide   Living Environment Comment Pt lives with spouse in mobile home, 3 stairs to enter, all needs met on main level, tub/shower, standard height toilet.    Self-Care   Dominant Hand right   Usual Activity Tolerance moderate   Current Activity Tolerance moderate   Regular Exercise no   Equipment Currently Used at Home none   Activity/Exercise/Self-Care Comment Indep PLOF   Functional Level Prior   Ambulation 0-->independent   Transferring 0-->independent   Toileting 0-->independent   Bathing 0-->independent   Dressing 0-->independent   Eating 0-->independent   Communication 0-->understands/communicates without difficulty   Swallowing 0-->swallows foods/liquids without difficulty   Cognition 0 - no cognition issues reported   Fall history within last six months no   Which of the above functional risks had a recent onset or change? ambulation   Prior Functional Level Comment Pt states he can amb funtional grocery store distances with use of cart, has portable O2 concentrator up to 3Lpm he uses with activity, no O2 at  "rest.   General Information   Onset of Illness/Injury or Date of Surgery - Date 08/03/18   Referring Physician Stephane Perez MD   Patient/Family Goals Statement return home today or tomorrow   Pertinent History of Current Problem (include personal factors and/or comorbidities that impact the POC) Mahad Ward is a 72 year old male with a [facility], quit smoking 4 years ago, BPH s/p TURP 6/6/18, admitted on 7/30/2018 with productive cough and SOB and on presentation had e/o acute on chronic respiratory failure with sats in the upper 70 % range which promptly responded to supplemental O2.  Admission evaluation notable for extensive bilateral pulmonary infiltrates, elevated trop at 0.256, and BNP at 9K.  Although clinically it all seemed most consistent with with COPD with exacerbation 2/2 LRI presumed bacterial. There was no e/o sepsis.    Currently, pt reports feeling \"much better\".   Precautions/Limitations oxygen therapy device and L/min  (currently on 3Lpm O2)   General Observations 3Lpm O2 currently, SaO2 92%.   Cognitive Status Examination   Orientation orientation to person, place and time   Pain Assessment   Patient Currently in Pain No   Integumentary/Edema   Integumentary/Edema Comments mild generalized swelling B lower extremities, redness L lower leg   Posture    Posture Forward head position   Range of Motion (ROM)   ROM Comment WFL BLEs to > 100 deg flexion, AROM shoulders overhead   Strength   Strength Comments 5/5 B knee ext, 4+/5 B hip flexion, 5/5 B ankle DF, WFL for mobility, generalized deconditioning assumed   Bed Mobility   Bed Mobility Comments Indep supine > sit EOB with HOB elevated   Transfer Skills   Transfer Comments SBA/indep sit <> stand transfers, good functional standing balance, no UE support needed   Gait   Gait Comments Amb with reciprocal gait pattern, no AD, mild imbalance at times but no LOB, SOB reported, on 3Lpm O2   Balance   Balance " "Comments Rhomberg EO x 30 sec steady, EC x 10 sec mild sway, mostly steady gait with mild dynamic gait impairement, no overt LOB, no AD, low fall risk.   Sensory Examination   Sensory Perception Comments baseline mild numbness in feet   Coordination   Coordination no deficits were identified   Muscle Tone   Muscle Tone no deficits were identified   General Therapy Interventions   Planned Therapy Interventions gait training;risk factor education;home program guidelines;progressive activity/exercise;strengthening   Clinical Impression   Criteria for Skilled Therapeutic Intervention yes, treatment indicated   PT Diagnosis Impaired functional activity tolerance and endurance   Influenced by the following impairments O2 needs, deconditioning, mild swelling LEs, SOB with activity   Functional limitations due to impairments Impaired tolerance with functional amb distances, SOB/increased O2 needs with activity   Clinical Presentation Stable/Uncomplicated   Clinical Presentation Rationale improving status, indep PLOF, current O2 needs   Clinical Decision Making (Complexity) Low complexity   Therapy Frequency` daily   Predicted Duration of Therapy Intervention (days/wks) 1 day   Anticipated Equipment Needs at Discharge (none)   Anticipated Discharge Disposition Home   Risk & Benefits of therapy have been explained Yes   Patient, Family & other staff in agreement with plan of care Yes   Clinical Impression Comments will benefit from 1 PT session to education/train on proper activity guidelines/progressive walking/exercise program.   [facility] [employer] \"6 Clicks\"   2016, Trustees of [facility], under license to [employer].  All rights reserved.   6 Clicks Short Forms Basic Mobility Inpatient Short Form   [facility] [employer]  \"6 Clicks\" V.2 Basic Mobility Inpatient Short Form   1. Turning from your back to your side while in a flat bed without using bedrails? 4 - None   2. Moving from lying on your back to " sitting on the side of a flat bed without using bedrails? 4 - None   3. Moving to and from a bed to a chair (including a wheelchair)? 4 - None   4. Standing up from a chair using your arms (e.g., wheelchair, or bedside chair)? 4 - None   5. To walk in hospital room? 3 - A Little   6. Climbing 3-5 steps with a railing? 3 - A Little   Basic Mobility Raw Score (Score out of 24.Lower scores equate to lower levels of function) 22   Total Evaluation Time   Total Evaluation Time (Minutes) 15[CS1.1]        Revision History        User Key Date/Time User Provider Type Action    > CS1.1 8/4/2018  9:26 AM Jasbir Shah, PT Physical Therapist Sign            Progress Notes by Norma Rojas OT at 8/3/2018  4:02 PM     Author:  Norma Rojas OT Service:  (none) Author Type:  Occupational Therapist    Filed:  8/3/2018  4:02 PM Date of Service:  8/3/2018  4:02 PM Creation Time:  8/3/2018  4:02 PM    Status:  Signed :  Norma Rojas OT (Occupational Therapist)          08/03/18 1527   Quick Adds   Type of Visit Initial Occupational Therapy Evaluation   Living Environment   Lives With spouse   Living Arrangements mobile home   Home Accessibility stairs to enter home;tub/shower is not walk in   Number of Stairs to Enter Home 3   Number of Stairs Within Home 0   Transportation Available car;family or friend will provide   Living Environment Comment Pt lives with spouse in mobile home, 3 stairs to enter, all needs met on main level, tub/shower, standard height toilet.    Self-Care   Dominant Hand right   Usual Activity Tolerance moderate   Current Activity Tolerance moderate   Regular Exercise no   Equipment Currently Used at Home none   Functional Level Prior   Ambulation 0-->independent   Transferring 0-->independent   Toileting 0-->independent   Bathing 0-->independent   Dressing 0-->independent   Eating 0-->independent   Communication 0-->understands/communicates without difficulty   Swallowing 0-->swallows  foods/liquids without difficulty   Cognition 0 - no cognition issues reported   Fall history within last six months no   Prior Functional Level Comment Pt reports indep in all ADLS, IADLs and mobility tasks with no AD at baseline. Shares household tasks with spouse.    General Information   Onset of Illness/Injury or Date of Surgery - Date 07/30/18   Referring Physician Stephane Perez MD   Patient/Family Goals Statement Pt's goal is to d/c home   Additional Occupational Profile Info/Pertinent History of Current Problem Per chart: Pt is a 72 year old male with a history of O2 dependent COPD at [facility], quit smoking 4 years ago, BPH s/p TURP 6/6/18, admitted on 7/30/2018 with productive cough and SOB and on presentation had e/o acute on chronic respiratory failure with sats in the upper 70 % range which promptly responded to supplemental O2.  Admission evaluation notable for extensive bilateral pulmonary infiltrates, elevated trop at 0.256, and BNP at 9K.  Although clinically it all seemed most consistent with with COPD with exacerbation 2/2 LRI presumed bacterial.   Precautions/Limitations oxygen therapy device and L/min   Cognitive Status Examination   Orientation orientation to person, place and time   Level of Consciousness alert   Able to Follow Commands WNL/WFL   Personal Safety (Cognitive) mild impairment   Memory impaired   Visual Perception   Visual Perception No deficits were identified   Sensory Examination   Sensory Comments Pt denies numbness/tingling in BUEs   Pain Assessment   Patient Currently in Pain No   Range of Motion (ROM)   ROM Comment WFL   Strength   Strength Comments BUE MMT: 4+/5   Hand Strength   Hand Strength Comments WFL   Coordination   Upper Extremity Coordination No deficits were identified   Mobility   Bed Mobility Comments Initiated - refer to OT daily note for details.    Bed Mobility Skill: Sit to Supine   Level of [place]: Sit/Supine stand-by assist   Physical  Assist/Nonphysical Assist: Sit/Supine 1 person assist   Bed Mobility Skill: Supine to Sit   Level of [place]: Supine/Sit stand-by assist   Physical Assist/Nonphysical Assist: Supine/Sit 1 person assist   Transfer Skills   Transfer Comments Initiated - refer to OT daily note for details   Transfer Skill: Bed to Chair/Chair to Bed   Level of [place]: Bed to Chair independent   Transfer Skill: Sit to Stand   Level of [place]: Sit/Stand independent   Transfer Skill: Toilet Transfer   Level of [place]: Toilet independent   Balance   Balance Comments No balance deficits identified   Upper Body Dressing   Level of [place]: Dress Upper Body stand-by assist   Physical Assist/Nonphysical Assist: Dress Upper Body 1 person assist   Lower Body Dressing   Level of [place]: Dress Lower Body stand-by assist   Physical Assist/Nonphysical Assist: Dress Lower Body 1 person assist   Toileting   Level of [place]: Toilet stand-by assist   Physical Assist/Nonphysical Assist: Toilet 1 person assist   Grooming   Level of [place]: Grooming stand-by assist   Physical Assist/Nonphysical Assist: Grooming 1 person assist   Instrumental Activities of Daily Living (IADL)   Previous Responsibilities laundry;housekeeping;yardwork;medication management;driving   IADL Comments Pt has support of spouse for IADLs as needed upon return home   Activities of Daily Living Analysis   Impairments Contributing to Impaired Activities of Daily Living (O2 use)   ADL Comments Pt mildly below baseline level of functioning in ADLs, limited by decreased activity tolerance   General Therapy Interventions   Planned Therapy Interventions ADL retraining;IADL retraining;strengthening   Clinical Impression   Criteria for Skilled Therapeutic Interventions Met yes, treatment indicated   OT Diagnosis Impaired ADLs, IADLs and mobility   Influenced by the following impairments Decreased functional activity tolerance, O2 need, SOB  "  Assessment of Occupational Performance 5 or more Performance Deficits   Identified Performance Deficits Bathing, dressing, grooming, toileting, homemaking, transfers   Clinical Decision Making (Complexity) Low complexity   Therapy Frequency daily   Predicted Duration of Therapy Intervention (days/wks) 3 days   Anticipated Discharge Disposition Home with Assist   Risks and Benefits of Treatment have been explained. Yes   Patient, Family & other staff in agreement with plan of care Yes   Clinical Impression Comments Pt would benefit from skilled OT to maximize safety and indep in all ADLs, IADLs and mobility tasks due to current deficits impacting function    [facility] AM-PAC  \"6 Clicks\" Daily Activity Inpatient Short Form   1. Putting on and taking off regular lower body clothing? 3 - A Little   2. Bathing (including washing, rinsing, drying)? 3 - A Little   3. Toileting, which includes using toilet, bedpan or urinal? 3 - A Little   4. Putting on and taking off regular upper body clothing? 3 - A Little   5. Taking care of personal grooming such as brushing teeth? 4 - None   6. Eating meals? 4 - None   Daily Activity Raw Score (Score out of 24.Lower scores equate to lower levels of function) 20   Total Evaluation Time   Total Evaluation Time (Minutes) 10[JL1.1]        Revision History        User Key Date/Time User Provider Type Action    > JL1.1 8/3/2018  4:02 PM Norma Rojas OT Occupational Therapist Sign            "

## 2018-07-30 NOTE — ED NOTES
Pt arrived to room after XR. Pt with SOB. Oxygen at 78%. Patient placed on nasal cannula. See flowsheet. Pt oxygen saturation at 94% with 3L. Pt states SOB improving. MD at beside. Will continue to monitor.

## 2018-07-30 NOTE — LETTER
Key Recommendations:  Patient currently receives the following services:  Home O2- [facility]       Patient admitted with COPD exacerbation.Patient receiving nebs and steroids in hospital. Patient does not have a neb machine at home. Given and reviewed COPD action sheet with patient. Reinforced to monitor for COPD symptoms daily and utilize action plan for seeking medical care.   Patient will need close monitoring for COPD symptoms, monitor BNP as was elevated 9383, and may need neb machine for home.    Sunshine De La Rosa    AVS/Discharge Summary is the source of truth; this is a helpful guide for improved communication of patient story

## 2018-07-30 NOTE — H&P
History and Physical     Mahad Ward MRN# [id number]   YOB: 1945 Age: 72 year old      Date of Admission:  7/30/2018    Primary care provider: Siena Gonzalez          Assessment and Plan:   aMhad Ward is a 72 year old male with a PMH significant for O2 dependent COPD and BPH s/p TURP on 6/6/18, who presents with increased cough and SOB.    1. Acute on chronic respiratory failure - underlying O2 dependent COPD, bilateral infiltrates on CXR with elevated WBC at 12.3. Likely COPD exacerbation due to pneumonia. BNP and troponin are also elevated, 9300 and 0.256 respectively, raising concern for cardiogenic cause and possible pulmonary edema. Continue O2, wean to baseline 3L. Management of COPD and pneumonia per below. Will trend trops, monitor on tele and get an echo in the AM.   2. COPD exacerbation due to pneumonia - 1 week of increasing productive cough of yellow sputum and increased SOB. Afebrile, tachypneic, mild tachycardia and elevated WBC of 12.3. Received IV Rocephin and Azithromycin in ED, will continue. Also received 125 mg IV solu-medrol, Duoneb and continuous albuterol neb for 1 hr. He also received 1L LR, 500 mL NS and mag sulfate. Continue scheduled Duonebs, PRN albuterol nebs and 60 mg IV solu-medrol daily. Wean to baseline O2 of 3L.   3. RK - Cr 1.27, BUN 38. Possible mild dehydration due to poor PO intake due to SOB vs mild fluid overload. Received 1.5 L of IVFs in ED. Hold off on further hydration, recheck in AM. Echo per above, pending results, may consider diuresis. Pt also recently underwent TURP which may be contributing.   4. BPH - s/p TURP on 6/6. Resume home meds  5. Leukocytosis - likely due to underlying pneumonia. procalcitonin pending.       Prophylaxis - mechanical   Code status - Full Code  Dispo - admit to inpatient                Chief Complaint:   Cough, SOB         History of Present Illness:   Mahad Ward is a 72 year old male with a PMH significant  for O2 dependent COPD and BPH s/p TURP on 6/6/18, who presents with increased cough and SOB.  Patient reports 1 week of increasing productive cough.  He notes yellow sputum.  He notes that this is an increase from his baseline.  He also notes shortness of breath that is worse with exertion and mild associated chest pain.  He denies fever, chills, abdominal pain, nausea, vomiting, diarrhea or dysuria.  He is chronically on O2 at home, stating he is on 3 L.  Of note, he underwent a TURP in early June without any respiratory complications.  In the ED, vital signs relatively stable.  Heart rates 105, respirations 22 and SPO2 was 78% on room air.  Patient is maintained on 3 L of O2 at home and is satting 94-98% on 3 L.  CMP shows an elevated creatinine of 1.27, elevated BUN 38, otherwise unremarkable.  BNP is elevated at 9383, troponin is elevated at 0.256.  CBC shows a elevated white count of 12.3, otherwise unremarkable.  Chest x-ray shows extensive patchy bilateral interstitial infiltrates.  EKG shows sinus rhythm with occasional PVC, right atrial enlargement, and pulmonary disease pattern.  He received 500 mL normal saline bolus, 1 L lactated Ringer bolus, DuoNeb, continuous albuterol neb for 1 hour, 1 g IV mag sulfate, 125 mg IV Solu-Medrol IV Rocephin and IV azithromycin.  He will be admitted for further management of COPD exacerbation likely due to underlying pneumonia.    Hx obtained by speaking with ED physician, chart review and pt interview.               Past Medical History:     Past Medical History:   Diagnosis Date     BPH (benign prostatic hyperplasia)      COPD (chronic obstructive pulmonary disease) (H)                Past Surgical History:   History reviewed. No pertinent surgical history.            Social History:     Social History     Social History     Marital status:      Spouse name: N/A     Number of children: N/A     Years of education: N/A     Occupational History     Not on file.  "    Social History Main Topics     Smoking status: Former Smoker     Smokeless tobacco: Never Used     Alcohol use No     Drug use: No     Sexual activity: Not on file     Other Topics Concern     Not on file     Social History Narrative               Family History:   Reviewed and noncontributory         Allergies:      Allergies   Allergen Reactions     Levaquin [Levofloxacin]                Medications:     Prior to Admission medications    Medication Sig Last Dose Taking? Auth Provider   Cholecalciferol (VITAMIN D3) 2000 units TABS Take 2,000 Units by mouth every morning 7/29/2018 at am Yes Unknown, Entered By History   Fluticasone-Salmeterol (AIRDUO RESPICLICK) 113-14 MCG/ACT AEPB inhaler Inhale 1 puff into the lungs 2 times daily  Yes Unknown, Entered By History   Ipratropium-Albuterol (COMBIVENT RESPIMAT)  MCG/ACT inhaler Inhale 1 puff into the lungs every 4 hours   Yes Reported, Patient   Multiple Vitamins-Minerals (CENTRUM SILVER) per tablet Take 1 tablet by mouth daily 7/29/2018 at am Yes Reported, Patient              Review of Systems:   A Comprehensive greater than 10 system review of systems was carried out.  Pertinent positives and negatives are noted above.  Otherwise negative for contributory information.     Review Of Systems  Skin: negative  Eyes: negative  Ears/Nose/Throat: negative  Respiratory: No shortness of breath, dyspnea on exertion, cough, or hemoptysis  Cardiovascular: negative  Gastrointestinal: negative  Genitourinary: negative  Musculoskeletal: negative  Neurologic: negative  Psychiatric: negative  Hematologic/Lymphatic/Immunologic: negative  Endocrine: negative             Physical Exam:   Blood pressure 113/80, pulse 105, temperature 98.6  F (37  C), temperature source Oral, resp. rate 22, height 1.803 m (5' 11\"), weight 81.6 kg (180 lb), SpO2 98 %.  Exam:  GENERAL:  Comfortable.  PSYCH: pleasant, oriented, No acute distress.  HEENT:  Atraumatic, normocephalic. Normal " conjunctiva, normal hearing, and oropharynx is normal.  NECK:  Supple, no neck vein distention  HEART:  Normal S1, S2 with no murmur, no pericardial rub, gallops or S3 or S4.  LUNGS: Lung sounds throughout, slight inspiratory wheeze and expiratory wheeze, mildly tachypneic No rales or ronchi.  GI:  Soft, normal bowel sounds. Non-tender, non distended.  EXTREMITIES: Trace bilateral lower extremity edema, +2 pulses bilateral and equal.  SKIN:  Dry to touch, No rash, wound or ulcerations.  NEUROLOGIC:  CN 2-12 intact, BL 5/5 symmetric upper and lower extremity strength, sensation is intact with no focal deficits.               Data:       Recent Labs  Lab 07/30/18  1505   NTBNPI 9383*       Recent Labs  Lab 07/30/18  1505   WBC 12.3*   HGB 13.4   HCT 43.0   MCV 96          Recent Labs  Lab 07/30/18  1505      POTASSIUM 5.0   CHLORIDE 104   CO2 29   ANIONGAP 7   *   BUN 38*   CR 1.27*   GFRESTIMATED 55*   GFRESTBLACK 67   KATELYNN 9.0   PROTTOTAL 8.1   ALBUMIN 2.5*   BILITOTAL 0.8   ALKPHOS 133   AST 19   ALT 27     Invalid input(s): LACTIC ACID    Recent Labs  Lab 07/30/18  1922 07/30/18  1505   TROPI 0.213* 0.256*       Recent Results (from the past 48 hour(s))   XR Chest 2 Views    Narrative    XR CHEST 2 VW 7/30/2018 3:14 PM    HISTORY: Respiratory Distress;       Impression    IMPRESSION: Extensive patchy bilateral interstitial infiltrates.    MD Michelle GRAYSON PA-C    This patient was seen and discussed with Dr. Sanford who agrees with the current plans as outlined above.

## 2018-07-30 NOTE — IP AVS SNAPSHOT
"[place] [address] MEDICAL SURGICAL: [phone]                                              INTERAGENCY TRANSFER FORM - PHYSICIAN ORDERS   2018                    Hospital Admission Date: 2018  GREGOR COHEN   : 1945  Sex: Male        Attending Provider: Raul Sanford MD     Allergies:  Levaquin [Levofloxacin]    Infection:  None   Service:  GENERAL [facility]    Ht:  1.803 m (5' 11\")   Wt:  73.7 kg (162 lb 6.4 oz)   Admission Wt:  81.6 kg (180 lb)    BMI:  22.65 kg/m 2   BSA:  1.92 m 2            Patient PCP Information     [facility]      ED Clinical Impression     Diagnosis Description Comment Added By Time Added    COPD exacerbation (H) [J44.1] COPD exacerbation (H) [J44.1]  Siena Giordano MD 2018  4:40 PM    Hypoxia [R09.02] Hypoxia [R09.02]  Siena Giordano MD 2018  4:40 PM    Renal insufficiency [N28.9] Renal insufficiency [N28.9]  Siena Giordano MD 2018  4:40 PM    Elevated troponin [R74.8] Elevated troponin [R74.8]  Siena Giordano MD 2018  7:52 AM      Hospital Problems as of 2018              Priority Class Noted POA    COPD exacerbation (H) Medium  2018 Unknown    Pneumonia Medium  2018 Unknown      Non-Hospital Problems as of 2018     None      Code Status History     Date Active Date Inactive Code Status Order ID Comments User Context    2018 10:49 AM  Full Code [id number]  Stephane Perez MD Outpatient    2018  6:54 PM 2018 10:49 AM Full Code [id number]  Michelle Yeh PA-C Inpatient         Medication Review      START taking        Dose / Directions Comments    amoxicillin-clavulanate 875-125 MG per tablet   Commonly known as:  AUGMENTIN        Dose:  1 tablet   Start taking on:  2018   Take 1 tablet by mouth 2 times daily for 1 day   Quantity:  2 tablet   Refills:  0        benzonatate 100 MG capsule   Commonly known as:  TESSALON        Dose:  100 " mg   Take 1 capsule (100 mg) by mouth 3 times daily as needed for cough   Quantity:  15 capsule   Refills:  0        enoxaparin 80 MG/0.8ML injection   Commonly known as:  LOVENOX   Used for:  Acute deep vein thrombosis (DVT) of left lower extremity, unspecified vein (H)        Dose:  1 mg/kg   Inject 0.74 mLs (74 mg) Subcutaneous every 12 hours   Quantity:  2 Syringe   Refills:  0        predniSONE 20 MG tablet   Commonly known as:  DELTASONE        Take 2 tabs (40 mg) by mouth daily x 1 day, 1 tabs (20 mg) daily x 2 days, 1/2 tab (10 mg) daily x 2 days.   Quantity:  5 tablet   Refills:  0        warfarin 4 MG tablet   Commonly known as:  COUMADIN   Used for:  Acute deep vein thrombosis (DVT) of left lower extremity, unspecified vein (H)        Dose:  4 mg   Take 1 tablet (4 mg) by mouth daily   Quantity:  7 tablet   Refills:  0    Repeat INR this coming Monday and adjust accordingly 8/6/2018         CONTINUE these medications which have NOT CHANGED        Dose / Directions Comments    CENTRUM SILVER per tablet        Dose:  1 tablet   Take 1 tablet by mouth daily   Refills:  0        COMBIVENT RESPIMAT  MCG/ACT inhaler   Generic drug:  Ipratropium-Albuterol        Dose:  1 puff   Inhale 1 puff into the lungs every 4 hours   Refills:  0        Fluticasone-Salmeterol 113-14 MCG/ACT Aepb inhaler   Commonly known as:  AIRDUO RESPICLICK        Dose:  1 puff   Inhale 1 puff into the lungs 2 times daily   Refills:  0        Vitamin D3 2000 units Tabs        Dose:  2000 Units   Take 2,000 Units by mouth every morning   Refills:  0                Summary of Visit     Reason for your hospital stay       Admitted for increasing SOB, found with COPD in flare, possible pneumonia, acute DVT             After Care     Activity       Your activity upon discharge: activity as tolerated       Diet       Follow this diet upon discharge: Orders Placed This Encounter      Combination Diet Regular Diet Adult              Procedures     Oxygen Adult       [place] [facility] Order 4-5 liter(s) by nasal cannula pulse dosing with use of portable tank. Expected treatment length is indefinite (99 months).. Test on conserving device as applicable.    Patients who qualify for home O2 coverage under the CMS guidelines require ABG tests or O2 sat readings obtained closest to, but no earlier than 2 days prior to the discharge, as evidence of the need for home oxygen therapy. Testing must be performed while patient is in the chronic stable state. See notes for O2 sats.    I certify that this patient, Mahad Ward has been under my care and that I, or a nurse practitioner or physician's assistant working with me, had a face-to-face encounter that meets the face-to-face encounter requirements with this patient on 8/4/2018. The patient, Mahad Ward was evaluated or treated in whole, or in part, for the following medical condition, which necessitates the use of the ordered oxygen. Treatment Diagnosis: acute on chronic hypoxic respiratory failure with COPD and cor pulmonale    Attending Provider: Stephane Perez MD  Physician signature: See electronic signature associated with these discharge orders  Date of Order: August 4, 2018             Follow-Up Appointment Instructions     Future Labs/Procedures    Follow-up and recommended labs and tests      Comments:    Follow up with primary care provider, González Cornelius, within 7 days to evaluate medication change, to evaluate treatment change and for hospital follow- up.  The following labs/tests are recommended: Repeat INR levels this coming Monday, August 6, 2018 and please adjust Coumadin dosing accordingly..      Follow-Up Appointment Instructions     Follow-up and recommended labs and tests        Follow up with primary care provider, González Cornelius, within 7 days to evaluate medication change, to evaluate treatment change and for hospital follow- up.  The following labs/tests are recommended:  Repeat INR levels this coming Monday, August 6, 2018 and please adjust Coumadin dosing accordingly..             Statement of Approval     Ordered          08/04/18 1049  I have reviewed and agree with all the recommendations and orders detailed in this document.  EFFECTIVE NOW     Approved and electronically signed by:  Stephane Perez MD

## 2018-07-30 NOTE — ED PROVIDER NOTES
History     Chief Complaint:  Shortness of Breath    HPI   Mahad Ward is a 72 year old male with a history of COPD exacerbation and hyperlipidemia who presents to the emergency department today for evaluation of shortness of breath. The patient reports he has been experiencing a productive yellow phlegm cough and shortness of breath for the past week. His shortness of breath has continued to worsen prompting his visit to the emergency department. He denies fever, recent steroid use, hemoptysis, leg swelling, blood thinner use. Of note, the patient follows up with his primary care physician for his COPD and does not follow up with a pulmonologist. He is on 2 liters nasal cannula at home.   Dyspnea on exertion.  No chest pain.  No hemoptysis.  No leg pain/swelling.  No recent antibiotics or trips.    Allergies:  Levaquin [Levofloxacin]    Medications:    Ipratropium-Albuterol (COMBIVENT RESPIMAT)  MCG/ACT inhaler  Multiple Vitamins-Minerals (CENTRUM SILVER) per tablet  tamsulosin (FLOMAX) 0.4 MG capsule  VITAMIN D, CHOLECALCIFEROL, PO    Past Medical History:    BPH  COPD  Hyperlipidemia  Lung nodule  Colon polyp  Erectile dysfunction  Skin cancer    BPH (benign prostatic hyperplasia) 06/06/2018   COPD with acute exacerbation (HC) 10/27/2014   Hyperlipidemia 09/30/2014   Stasis dermatitis, left leg 12/07/2013   ACP (advance care planning) 08/29/2013   Overview:     Formatting of this note may be different from the original.  Patient has identified Health Care Agent(s): Yes  Add Health Care Agents: Yes   Health Care Agent(s):  Primary Health Care Agent: Hortencia Ward Relationship: wife Phone: (h) [phone] (c) [phone]   Secondary Health Care Agent: Irwin Ward Relationship: son Phone: (c) [phone]     Patient has Advance Care Plan Documents (Health Care Directive, POLST): Yes   Advance Care Plan Documents:  Health Care Directive completed 8/29/13    Patient has identified Specific  "Treatment Preferences: Yes   Specific Treatment Preferences:   Code Status: CPR/Attempt Resuscitation     Goals of Treatment: Provide Life sustaining treatment. Intubate, cardiovert, and provide medically necessary care to sustain life if expected to survive.    If it is reasonable certain that patient will not recover the ability to know who he is or has a terminal condition or in a persistent vegetative state. STOP OR WITHHOLD ALL TREATMENTS that are prolonging his life         Last Assessment & Plan:     Advance Care Planning: Disease-specific Session    Mahad Ward is a [facility] patient of primary care provider Dr González Cornelius [facility].    Advance care planning discussions were completed with Mahad and his healthcare agent, his wife Hortencia Ward at the [facility].    Understanding of Illness and Disease [place]:   Mahad identifies his medical condition as COPD. Mahad shares that he was diagnosed 3-4 years ago. He shares that he was a smoker but has quit. Mahad also shares that he has \"bad\" circulation and his left leg swells. In regards to his COPD Mahad shares that if he \"does not do anything strenuous he gets along OK\"  Mahad admits that he does get SOB with exertion and the heat, humidity and cold weather can make it more difficulty to breath. Mahad shares that he uses his \"puffers\" referring to his inhalers and they help. According to Mahad he uses his Advair twice a day and the Combivent 3-4 times a day. Medical history is also significant for venous insufficiency and BPH.   Mahad identifies the following symptoms of his medical condition as being the most bothersome: dyspnea on exertion. His COPD has restricted some activity. He admits that he can't walk any distance in the woods and go hunting like he once did. Last fall he and a friend got a permit to hunt from their car.     Goals of Care:   Mahad currently hopes to maintain independence.    Quality of Life:   The following " "present and future experiences are most important for Mahad to live well: Spending time with family.  Mahad and Hortencia have been  for 47 years. They had 3 children. Their first born son Vasquez was killed at age 2 when he was hit by a car. They have a daughter Madina who lives in [place] and son Irwin who lives in [place]. They have 4 grand children.  Mahad is independent in activities of daily living. They live in a mobile home in [place].  Mahad shares that he likes to hunt and fish and enjoys traveling.   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    Mahad devante with serious challenges in his life: support from wife and children.    Mahad identifies the following fears and worries about his medical care: none     Treatment and Care Preferences:   Past experiences in dealing with family and/or friends that have  or been seriously ill include the tragic death of his first born son Vasquez. Mahad also shares that his father  of lung and esophageal cancer at the age of 84. According to Mahad his father  at home with hospice. He had hospice support for a few weeks. Mahad further shared that his mother also had COPD. She was dependent on oxygen and  in the hospital on her birthday from pneumonia.    As a result of these experiences, Mahad expresses these health care preferences:  \" Really hope it's just bang! Don't not want to be kept alive on machines.    If Mahad reaches a point where he can no longer make decisions for herself and it is reasonable certain that she will not recover the ability to know who he is or has a terminal condition or in a persistent vegetative state.    Mahad requests to STOP OR WITHHOLD ALL TREATMENTS that are prolonging his life. This includes but is not limited to tube feedings, IV (intravenous) fluids, respirator/ventilator (breathing machine), cardiopulmonary resuscitation (CPR), and antibiotics.    If/when Mahad is at the " "end-stage of his illness or terminally ill, he has chosen to receive care at home with hospice.    Summary Mahad's Treatment Preferences:  LOW SURVIVAL; HIGH TREATMENT BURDEN: If Mahad suffered a serious complication, such that he was facing a prolonged hospital stay, required ongoing medical interventions, and the chance of living through the complication was low (for example, only 5 out of 100 would live), Mahad would choose: to focus treatment on comfort and quality of life (\"Quality of life is more important than length of life to Mahad.\")  Comment: \"Does'nt seem reasonable to prolong treatment.\"  \"Don't want to lay there and let machines to do all the work.\"    HIGH SURVIVAL; LOW FUNCTIONAL STATUS: If Mahad had a serious complication and had a good chance of living through the complication but it was expected that he would never be able to walk or talk again and would require 24 hour nursing care, he would choose: to focus treatment on comfort and quality of life (\"Quality of life is more important than length of life to Mahad.\")  Comment: \"miserable\"    HIGH SURVIVAL; LOW COGNITIVE STATUS: If Mahad had a serious complication and had a good chance of living through the complication but it was expected that he would never know who he was or who he was with and would require 24 hour nursing care, he would choose: to focus treatment on comfort and quality of life (\"Quality of life is more important than length of life to Mahad.\")  Comment: \"Just keep me comfortable.\"  No tube feeding.    CARDIO-PULMONARY RESUSCITATION (CPR): The facts, risks and benefits of CPR were discussed with Mahad. If he had a sudden event that caused his heart and breathing to stop, he: WOULD want CPR attempted    MECHANICAL VENTILATION: If Mahad had an episode where he was unable to breathe on his own, he would choose the following: attempt to use any appropriate non-invasive method to assist breathing, and use of mechanical " ventilation Short term OK- but if won't be able to breath on his own would not want to be dependent on machines permanently.    Mahad has chosen his healthcare agent to: do what he or she thinks is best at the time, considering Mahad's wishes    Follow Up Plan:   Mahad was encouraged to continue advance care planning discussions with his primary care provider. Advance Care Planning discussion guide and CPR fact sheet were given to Mahad and his health care agent for review.     Mahad identified the following concerns during his advance care planning session: none    Questions identified for his primary care provider: none    Documents addressed during this advance care planning session:  * Health Care Agents identified. Primary health care agent is his wife Hortencia Ward; secondary health care agent is his son Irwin Ward.  * Health Care Directive completed and scanned into medical record.  * Statement of Treatment Preferences for advanced illness completed and scanned into the medical record.    Recommendations/Plan:   Mahad was encouraged to review Advance Care Plan with Son Irwin as he has been designated as his alternative healthcare agent.   Review HCD after any significant change in medical condition or every 5 years.    Mahad would benefit from: Care Navigation Help Desk  Advance Care Team and Care Navigation brochure(s) were given to Mahad and/or his healthcare agent.   Care Management services available through primary clinic to support and assist with living well and setting goals for with health and wellness or chronic illness.   No needs identified at this time.    Advance Care Planning documentation was cc ed to his primary provider.     Interviewer: Belen Florence RN PHN Advance Care Planning Facilitator [phone] 8/29/2013 9:54 PM  8/29/2013       Lung nodule 06/29/2011   Overview:     CT chest 4/2011  1. Diffuse emphysema.   2. Indeterminate opacities in both lungs are likely  benign, however, recommend followup CT chest in three months to assure stability.   3. Tiny cyst in the dome of the liver    CT 6/30/2011  The small areas of fibrosis and nodularity seen on the previous examination in both lungs are stable. New small areas of infiltrate in the left lung, typical of inflammatory change. Continued followup in six months time recommended     COPD (chronic obstructive pulmonary disease) (HC) 04/12/2011   Overview:     Seen Dr Bernal: MN LUNG.  Recommendation for oral abx and prednisone at onset of bronchitis.     Pseudophakia of both eyes 02/25/2011   Regular astigmatism 01/16/2009   Presbyopia 01/16/2009   Hypertrophy of prostate without urinary obstruction and other lower urinary tract symptoms (LUTS) 03/17/2008   COLON POLYP 03/14/2005   INSUFFICIENCY, VENOUS NOS     ERECTILE DYSFUNCTION - ORGANIC     SKIN CANCER - BASAL CELL     Resolved Problems    Problem Noted Date Resolved Date   Acute on chronic respiratory failure with hypoxia (HC) 10/28/2014 08/10/2015   Pneumonia 10/28/2014 08/10/2015   DYSHIDROTIC ECZEMA 04/26/2004 05/10/2004   OBSTRUCTION, CHRONIC AIRWAY NEC   09/30/2014   ABNORMAL FINDING, STOOL CONTENTS   04/26/2004   FATIGUE AND MALAISE   04/26/2004         Past Surgical History:    APPENDECTOMY       COLONOSCOPY SCREENING   CATARACT REMOVAL x2  MN PRK LASER   TONSIL AND ADENOIDECTOMY        COLONOSCOPY SCREENING  TURP     Family History:    Cancer    Social History:  The patient was accompanied to the ED by wife.  Smoking Status: Former  Smokeless Tobacco: Never  Alcohol Use: No  Marital Status:       Review of Systems   Constitutional: Negative for fever.   Respiratory: Positive for cough and shortness of breath.    Cardiovascular: Negative for chest pain and leg swelling.   All other systems reviewed and are negative.  Pt c/o cough and shortness of breath for a week. Cough is productive of yellow phlegm.     Physical Exam     Patient Vitals for the past 24  "hrs:   BP Temp Temp src Pulse Resp SpO2 Height Weight   07/30/18 1449 100/61 98.6  F (37  C) Oral 105 22 (!) 82 % 1.803 m (5' 11\") 81.6 kg (180 lb)       Physical Exam  GEN: patient appears tired, wife at the bedside  HEAD: atraumatic, normocephalic  EYES: pupils reactive, conjunctivae normal  ENT: TMs flat and white bilaterally, oropharynx normal with no erythema or exudate, mucus membranes dry  NECK: no cervical LAD, no JVD  RESPIRATORY: mild tachypnea, breath sounds decreased to auscultation (exp wheezes), patient able to speak in full sentences  CVS: normal S1/S2, II/VI systolic ej murmurs, no rubs/gallops  ABDOMEN: soft, nontender, no masses or organomegaly, no rebound, decreased bowel sounds  BACK: no costovertebral angle tenderness  EXTREMITIES: intact pulses x 4, full range of motion at joints, no edema, venous stasis left leg but no redness  SKIN: warm and dry  NEURO: GCS 15, cranial nerves intact.  Motor- moves all 4 extremities.  Overall symmetrical exam.  Alert, wife at the bedside  HEME: no bruising or petechiae/contusions  LYMPH: no lymphadenopathy        Emergency Department Course   ECG:  Indication: shortness of breath  Completed at 1523.   Sinsu rhythm with occasional premature ventricular complexes  Right atrial enlargement  Pulmonary disease pattern  Abnormal ECG   Rate 99 bpm. SC interval 138. QRS duration 78. QT/QTc 334/428. P-R-T axes 86  60  76.    Imaging:  Radiology findings were communicated with the patient and family who voiced understanding of the findings.  XR chest 2 views  IMPRESSION: Extensive patchy bilateral interstitial infiltrates.  Report per radiology     Laboratory:  Laboratory findings were communicated with the patient and family who voiced understanding of the findings.  CBC: WBC 12.3 (H) o/w WNL. (HGB 13.4, )   CMP: AWNL (Creatinine 1.27 (H), glucose 126 (H), BUN 38 (H), GFR estimate 55 (L), albumin 2.5 (L) o/w WNL  Troponin (Collected 1505): 0.256 (HH)  BNP: 9383 " "(H)  Blood gas venous: pH Venous 7.30 (L), PCO2 Venous 58 (H), PO2 Venous 42, Bicarbonate 28, Base Deficit 71, FIO2 1.4   ISTAT gases lactate lea POCT: pH Venous 7.30 (L), PCO2 Venous 58 (H), PO2 Venous 42, Bicarbonate Venous 28, O2 Sat Venous 71, Lactic Acid 1.4      Blood cultures: Pending      Interventions:  1458 DuoNeb 3mL Nebulization   1532 NS Bolus 1,000mL IV  1534 solu-medrol 125 mg IV  1542 albuterol 10 mg continuous nebulization x 1 hour  1634 Rocephin 1 g IV  1634 Azithromycin 500 mg IV  1801 magnesium sulfate 1 g IV  LR at 75cc/hr   [place]  Cardiac/Sp02 monitoring  Oxygen by nasal cannula at 2L/min (plus face mask at 15L/min prn with nebs)    Emergency Department Course:  Nursing notes and vitals reviewed.  1507: I performed an exam of the patient as documented above.   1633: Patient rechecked and updated.   1645: I spoke with Michelle CLEVELAND of the hospitalist service regarding patient's presentation, findings, and plan of care.  Findings and plan explained to the Patient and spouse who consents to admission. Discussed the patient with Dr. Sanford, who will admit the patient to a cardiac telemetry bed for further monitoring, evaluation, and treatment.  I personally reviewed the laboratory, imaging, and EKG results with the Patient and spouse and answered all related questions prior to admission.    4:01 PM reviewed old records    5:43 PM recheck  /75  Pulse 105  Temp 98.6  F (37  C) (Oral)  Resp 22  Ht 1.803 m (5' 11\")  Wt 81.6 kg (180 lb)  SpO2 95%  BMI 25.1 kg/m2  Awaiting on bed    6pm awaiting on bed, improved  BP 98/60 (BP Location: Right arm)  Pulse 99  Temp 96.1  F (35.6  C) (Oral)  Resp 18  Ht 1.803 m (5' 11\")  Wt 75.8 kg (167 lb)  SpO2 95%  BMI 23.29 kg/m2    Most recent CT scan of chest.  ECHO in 2010 at the [facility].  2017 September  CT CHEST WO9/14/2017  "[facility]  Result Narrative   [facility] IMAGING  CT " CHEST WO  9/14/2017 10:16 AM    INDICATION: Lung nodule, COPD, follow-up.  TECHNIQUE: Routine chest. Dose reduction techniques were used.   IV CONTRAST: None.  COMPARISON: 10/27/2014.    FINDINGS:   LUNGS AND PLEURA: Small amount of scarring in the right lung apex, stable. Mild diffuse bronchial wall thickening greatest in the right lower lobe, stable. Emphysema, stable. There are a small number of tiny centrilobular nodules remaining in the posterior segment of the right upper lobe, periphery of the right lower lobe, and to a much lesser extent in the periphery of the left upper lobe all substantially reduced since 10/27/2014. No new or enlarging pulmonary nodules. No focal consolidation. No pleural effusion.    MEDIASTINUM: There are a few hilar and mediastinal lymph nodes at the upper limits of normal in size and stable. Atheromatous changes in a normal caliber thoracic aorta.    LIMITED UPPER ABDOMEN: Gallstones, stable. Small right hepatic and right renal cysts are also stable.    MUSCULOSKELETAL: Negative.    CONCLUSION:   1.  There has been almost complete resolution of previously demonstrated consolidative opacities and centrilobular nodules consistent with resolution of bronchopneumonia.  2.  Mild diffuse bronchial wall thickening, stable and consistent with chronic bronchitis.  3.  Emphysema, stable.  4.  Right apical scarring, stable.  5.  A few hilar and mediastinal lymph nodes at the upper limits of normal in size are stable and should be reactive.  6.  Gallstones, right hepatic cyst, and right renal cyst, all stable.         Impression & Plan    Medical Decision Making:  Mahad Ward is a 72 year old male who does have a history of COPD for which he is on home oxygen who presents with increasing shortness of breath, a productive yellow cough and having to increase his oxygen and nasal cannula.  The patient has described increasing dyspnea on exertion and a dry cough of yellow phlegm.  When he first  presented, his O2 sats were in the 70s-80s but he came up with oxygen by nasal cannula administration  An IV was placed and labs were sent.   Oxygen by nasal cannula and face mask was administered.     He was tachypneic and he was wheezing and appeared uncomfortable.  His initial EKG showed right ventricular strain but no evidence of STEMI.  An IV bag was placed with small fluid bolus (?CHF present).  He was given a DuoNeb followed by a continuous neb, magnesium, and Solu-Medrol.  He was taken for an x-ray which shows bilateral interstitial infiltrates ?new pneumonia vs CHF.  He had already been given Rocephin in addition to Zithromax.  His lactic acid was normal.  His pH was 7.3, and his pCO2 was 58 which is about at his baseline.  His white blood cell count was slightly elevated.  His creatinine was slightly high, so fluid resuscitation with LR has been ordered.  His BNP is slightly elevated to 9000 and will need to be trended (cannot get ahold of his previous ECHO in 2010 from [facility] to assess EF).  His troponin is slightly positive, but he endorses no  chest pain.  The plan will be to continue to hydrate, continue with the antibiotics, pulmonary toilet, and he will be admitted.  Needs cardiac evaluation to look for RWMAs.    Attempted to find old ECHO for the patient; I can see one from 2010, but not the results.  We cannot rule out that element of flash pulmonary edema could be present (bilateral infiltrates and elevated BNP).  Patient will need ECHO.  Most recent CT scan of the chest from 2017 noted above.  EKG with no STEMI and no chest pain.  Plan to trend troponin.  Overall patient's WOB much decreased.  Offerred patient BiPAP but he declines at this point.    Diagnosis:    ICD-10-CM    1. COPD exacerbation (H) J44.1    2. Hypoxia R09.02    3. Renal insufficiency N28.9    4. Dyspnea  5. Rule out CHF  6. Elevated troponin    Disposition:  Admitted to cardiac telemetry bed with Dr. Sanford and Michelle  Ruba CLEVELAND    Scribe Disclosure:  I, Bobby Woodsjulio, am serving as a scribe at 3:07 PM on 7/30/2018 to document services personally performed by Siena Giordano MD based on my observations and the provider's statements to me.     7/30/2018   [facility] EMERGENCY DEPARTMENT       Siena Giordano MD  07/31/18 0753

## 2018-07-30 NOTE — IP AVS SNAPSHOT
` `     [place] [address] MEDICAL SURGICAL: [phone]            Medication Administration Report for Mahad Ward as of 18 1213   Legend:    Given Hold Not Given Due Canceled Entry Other Actions    Time Time (Time) Time  Time-Action       Inactive    Active    Linked        Medications 18    0.9% sodium chloride BOLUS  Dose: 100 mL  Freq: ONCE Route: IV  Start: 18   Admin. Amount: 100 mL  Dispense Loc: [facility] Floor Stock  Administrations Remainin  Volume: 100 mL                      acetaminophen (TYLENOL) tablet 650 mg  Dose: 650 mg  Freq: EVERY 4 HOURS PRN Route: PO  PRN Reason: mild pain  Start: 18   Admin Instructions: Alternate ibuprofen (if ordered) with acetaminophen.  Maximum acetaminophen dose from all sources = 75 mg/kg/day not to exceed 4 grams/day.    Admin. Amount: 2 tablet (2 × 325 mg tablet)  Last Admin: 18 0943  Dispense Loc:  [facility]       0841 (650 mg)-Given       1552 (650 mg)-Given       2016 (650 mg)-Given        0339 (650 mg)-Given       0817 (650 mg)-Given       1602 (650 mg)-Given        0033 (650 mg)-Given       0926 (650 mg)-Given        0933 (650 mg)-Given        0943 (650 mg)-Given           albuterol neb solution 2.5 mg  Dose: 3 mL  Freq: EVERY 2 HOURS PRN Route: NEBULIZATION  PRN Reason: shortness of breath / dyspnea  Start: 18 185   Admin. Amount: 2.5 mg = 3 mL Conc: 2.5 mg/3 mL  Last Admin: 18 0355  Dispense Loc: [facility]  Volume: 3 mL          0355 (2.5 mg)-Given            benzonatate (TESSALON) capsule 100 mg  Dose: 100 mg  Freq: 3 TIMES DAILY PRN Route: PO  PRN Reason: cough  Start: 18 1446   Admin. Amount: 1 capsule (1 × 100 mg capsule)  Last Admin: 18 0748  Dispense Loc: [facility]         1604 (100 mg)-Given        0324 (100 mg)-Given       1620 (100 mg)-Given        0748 (100 mg)-Given           cefTRIAXone (ROCEPHIN) 1 g vial to attach to NS  100 mL bag for ADULTS or NS 50 mL bag for PEDS  Dose: 1 g  Freq: EVERY 24 HOURS Route: IV  Indications of Use: COMMUNITY ACQUIRED PNEUMONIA  Last Dose: 1 g (18 154)  Start: 18 153   Admin. Amount: 1 g  Last Admin: 18  Dispense Loc: [facility]  Infused Over: 15-30 Minutes  Volume: 10 mL       1552 (1 g)-New Bag        1546 (1 g)-New Bag        1549 (1 g)-New Bag        1544 (1 g)-New Bag        [ ] 1530           enoxaparin (LOVENOX) injection 70 mg  Dose: 1 mg/kg  Weight Dosing Info: 73.7 kg  Freq: EVERY 12 HOURS Route: SC  Start: 18 1000   Admin Instructions: Give Lovenox 1-2 hours AFTER turning heparin off <br>PharmD to adjust timing as patient is being taken off from IV heparin infusion    Admin. Amount: 70 mg = 0.7 mL Conc: 80 mg/0.8 mL  Last Admin: 18  Dispense Loc: [facility]  Volume: 0.8 mL          0932 (70 mg)-Given        (70 mg)-Given        0929 (70 mg)-Given       [ ] 2200           guaiFENesin (MUCINEX) 12 hr tablet 600 mg  Dose: 600 mg  Freq: 2 TIMES DAILY Route: PO  Start: 18 0530   Admin Instructions: DO NOT CRUSH.    Admin. Amount: 1 tablet (1 × 600 mg tablet)  Last Admin: 18 0749  Dispense Loc: [facility]       0541 (600 mg)-Given       0829 (600 mg)-Given       213 (600 mg)-Given        0817 (600 mg)-Given       1950 (600 mg)-Given               0926 (600 mg)-Given        (600 mg)-Given        0932 (600 mg)-Given        (600 mg)-Given        0749 (600 mg)-Given              [ ] 2100           iopamidol (ISOVUE-370) solution 500 mL  Dose: 500 mL  Freq: ONCE Route: IV  Start: 18   Admin. Amount: 500 mL  Dispense Loc: [facility] Floor Stock  Administrations Remainin  Volume: 500 mL       ()-Not Given               ipratropium - albuterol 0.5 mg/2.5 mg/3 mL (DUONEB) neb solution 3 mL  Dose: 3 mL  Freq: EVERY 4 HOURS PRN Route: NEBULIZATION  PRN Reason: wheezing  Start: 18 1145   Admin. Amount: 3 mL  Last Admin:  "08/01/18 1922  Dispense Loc: [facility]  Volume: 3 mL        1319 (3 mL)-Given       1922 (3 mL)-Given              lidocaine (LMX4) kit  Freq: EVERY 1 HOUR PRN Route: Top  PRN Reason: pain  PRN Comment: with VAD insertion or accessing implanted port.  Start: 07/30/18 1854   Admin Instructions: Do NOT give if patient has a history of allergy to any local anesthetic or any \"bri\" product.   Apply 30 minutes prior to VAD insertion or port access.  MAX Dose:  2.5 g (  of 5 g tube)    Dispense Loc:  Main Pharmacy               lidocaine 1 % 1 mL  Dose: 1 mL  Freq: EVERY 1 HOUR PRN Route: OTHER  PRN Comment: mild pain with VAD insertion or accessing implanted port  Start: 07/30/18 1854   Admin Instructions: Do NOT give if patient has a history of allergy to any local anesthetic or any \"bri\" product. MAX dose 1 mL subcutaneous OR intradermal in divided doses.    Admin. Amount: 1 mL  Dispense Loc: [facility] Stock  Volume: 2 mL               melatonin tablet 1 mg  Dose: 1 mg  Freq: AT BEDTIME PRN Route: PO  PRN Reason: sleep  Start: 07/30/18 1854   Admin Instructions: Do not give unless at least 6 hours of uninterrupted sleep is expected.    Admin. Amount: 1 tablet (1 × 1 mg tablet)  Dispense Loc: [facility] MS3[facility]               naloxone (NARCAN) injection 0.1-0.4 mg  Dose: 0.1-0.4 mg  Freq: EVERY 2 MIN PRN Route: IV  PRN Reason: opioid reversal  Start: 07/30/18 1854   Admin Instructions: For respiratory rate LESS than or EQUAL to 8.  Partial reversal dose:  0.1 mg titrated q 2 minutes for Analgesia Side Effects Monitoring Sedation Level of 3 (frequently drowsy, arousable, drifts to sleep during conversation).Full reversal dose:  0.4 mg bolus for Analgesia Side Effects Monitoring Sedation Level of 4 (somnolent, minimal or no response to stimulation).  For ordered doses up to 2mg give IVP. Give each 0.4mg over 15 seconds in emergency situations. For non-emergent situations further dilute in 9mL of NS to facilitate titration " of response.    Admin. Amount: 0.1-0.4 mg = 0.25-1 mL Conc: 0.4 mg/mL  Dispense Loc: [facility]  Volume: 1 mL               ondansetron (ZOFRAN-ODT) ODT tab 4 mg  Dose: 4 mg  Freq: EVERY 6 HOURS PRN Route: PO  PRN Reasons: nausea,vomiting  Start: 07/30/18 1854   Admin Instructions: This is Step 1 of nausea and vomiting management.  If nausea not resolved in 15 minutes, go to Step 2 prochlorperazine (COMPAZINE). Do not push through foil backing. Peel back foil and gently remove. Place on tongue immediately. Administration with liquid unnecessary  With dry hands, peel back foil backing and gently remove tablet; do not push oral disintegrating tablet through foil backing; administer immediately on tongue and oral disintegrating tablet dissolves in seconds; then swallow with saliva; liquid not required.    Admin. Amount: 1 tablet (1 × 4 mg tablet)  Dispense Loc: [facility]              Or  ondansetron (ZOFRAN) injection 4 mg  Dose: 4 mg  Freq: EVERY 6 HOURS PRN Route: IV  PRN Reasons: nausea,vomiting  Start: 07/30/18 1854   Admin Instructions: This is Step 1 of nausea and vomiting management.  If nausea not resolved in 15 minutes, go to Step 2 prochlorperazine (COMPAZINE).  Irritant. For ordered doses up to 4 mg, give IV Push undiluted over 2-5 minutes.    Admin. Amount: 4 mg = 2 mL Conc: 4 mg/2 mL  Dispense Loc: [facility]  Infused Over: 2-5 Minutes  Volume: 2 mL               polyethylene glycol (MIRALAX/GLYCOLAX) Packet 17 g  Dose: 17 g  Freq: DAILY PRN Route: PO  PRN Reason: constipation  Start: 07/30/18 1854   Admin Instructions: Give in 8oz of  water, juice, or soda. Hold for loose stools.  This is the second step of a three step constipation treatment.  1 Packet = 17 grams. Mixed prescribed dose in 8 ounces of water. Follow with 8 oz. of water.    Admin. Amount: 17 g  Dispense Loc: [facility]               predniSONE (DELTASONE) tablet 40 mg  Dose: 40 mg  Freq: DAILY Route: PO  Start: 08/04/18 0900   Admin.  Amount: 2 tablet (2 × 20 mg tablet)  Last Admin: 08/04/18 0748  Dispense Loc: [facility]           0748 (40 mg)-Given                  senna-docusate (SENOKOT-S;PERICOLACE) 8.6-50 MG per tablet 1 tablet  Dose: 1 tablet  Freq: 2 TIMES DAILY PRN Route: PO  PRN Reason: constipation  Start: 07/30/18 1854   Admin Instructions: If no bowel movement in 24 hours, increase to 2 tablets PO.  Hold for loose stools.  This is the first step of a three step constipation treatment.    Admin. Amount: 1 tablet  Dispense Loc: [facility]              Or  senna-docusate (SENOKOT-S;PERICOLACE) 8.6-50 MG per tablet 2 tablet  Dose: 2 tablet  Freq: 2 TIMES DAILY PRN Route: PO  PRN Reason: constipation  Start: 07/30/18 1854   Admin Instructions: Hold for loose stools.  This is the first step of a three step constipation treatment.    Admin. Amount: 2 tablet  Dispense Loc: [facility]               sodium chloride (PF) 0.9% PF flush 3 mL  Dose: 3 mL  Freq: EVERY 8 HOURS Route: IK  Start: 07/30/18 1900   Admin Instructions: And Q1H PRN, to lock peripheral IV dormant line.    Admin. Amount: 3 mL  Last Admin: 08/04/18 0325  Dispense Loc: [facility] Floor Stock  Volume: 4 mL      1858 (3 mL)-Given        0322 (3 mL)-Given       1110 (3 mL)-Given       2013 (3 mL)-Given        (0355)-Not Given       (1111)-Not Given       (1931)-Not Given        (0343)-Not Given       (1414)-Not Given               (0325)-Not Given       (1418)-Not Given               0325 (3 mL)-Given       (1122)-Not Given       [ ] 2000           sodium chloride (PF) 0.9% PF flush 3 mL  Dose: 3 mL  Freq: EVERY 1 HOUR PRN Route: IK  PRN Reason: line flush  PRN Comment: for peripheral IV flush post IV meds  Start: 07/30/18 1854   Admin. Amount: 3 mL  Last Admin: 08/03/18 1621  Dispense Loc: [facility] Floor Stock  Volume: 4 mL          1621 (10 mL)-Given            Warfarin Therapy Reminder (Check START DATE - warfarin may be starting in the FUTURE)  Dose: 1 each  Freq: CONTINUOUS PRN  Route: XX  Start: 18 1029   Admin Instructions: *Note to reorder warfarin daily*  Pharmacy Warfarin Dosing Service  Patient is on Warfarin Therapy - check for daily order    Admin. Amount: 1 each  Dispense Loc: [facility]              Future Medications  Medications 18       warfarin (COUMADIN) tablet 5 mg  Dose: 5 mg  Freq: ONCE AT 6PM Route: PO  Start: 18 1800   Admin. Amount: 1 tablet (1 × 5 mg tablet)  Dispense Loc:  [facility]  Administrations Remainin           [ ] 1800          Completed Medications  Medications 18         Dose: 250 mg  Freq: DAILY Route: PO  Indications of Use: COMMUNITY ACQUIRED PNEUMONIA  Start: 18 0900   End: 18 0932   Admin. Amount: 1 tablet (1 × 250 mg tablet)  Last Admin: 18  Dispense Loc:  [facility]  Administrations Remainin       0828 (250 mg)-Given        0817 (250 mg)-Given        0926 (250 mg)-Given        0932 (250 mg)-Given              Dose: 3,000 Units  Freq: ONCE Route: IV  Start: 18 1445   End: 18 1457   Admin Instructions: Nurse to administer dose from existing infusion. If no infusion bag or syringe for this order is available, contact pharmacist to re-enter medication order.    Admin. Amount: 3,000 Units  Last Admin: 18 145  Dispense Loc: [facility]  Administrations Remainin        1457 (3,000 Units)-Given                Dose: 5 mg  Freq: ONCE AT 6PM Route: PO  Start: 18 1800   End: 18   Admin. Amount: 1 tablet (1 × 5 mg tablet)  Last Admin: 18  Dispense Loc:  [facility]  Administrations Remainin          1717 (5 mg)-Given              Dose: 7.5 mg  Freq: ONCE AT 6PM Route: PO  Start: 18 1800   End: 18   Admin. Amount: 1 tablet (1 × 7.5 mg tablet)  Last Admin: 18 7676  Dispense Loc:  [facility]  Administrations Remaining:  0         1737 (7.5 mg)-Given            Discontinued Medications  Medications 18         Start: 18 1352   End: 18 0159   Admin Instructions: Sabrina Joshi : cabinet override  For ordered doses up to 100 mcg give IV Push undiluted over a minimum of 3-5 minutes.    Administrations Remainin        (1734)-Not Given [C]        0159-Med Discontinued           Rate: 16.5 mL/hr Dose: 1650 Units/hr  Freq: CONTINUOUS Route: IV  Last Dose: Stopped (18 0933)  Start: 18   End: 18   Admin Instructions: If platelet counts falls by 50% baseline, hold heparin and notify MD for further orders.  Goal Heparin Xa level 0.3-0.7    Last Admin: 18  Dispense Loc:  Main Pharmacy  Volume: 250 mL       2131 (1,350 Units/hr)-New Bag       2132 (1,350 Units/hr)-Rate/Dose Verify        1208 (1,350 Units/hr)-New Bag       1457 (1,650 Units/hr)-New Bag       1736 (1,650 Units/hr)-Rate/Dose Verify        0001 (1,650 Units/hr)-Rate/Dose Verify       0253 (1,650 Units/hr)-New Bag       0926 (1,650 Units/hr)-Rate/Dose Verify       2004 (1,650 Units/hr)-New Bag        0000 (1,650 Units/hr)-Rate/Dose Change       0328 (1,650 Units/hr)-Rate/Dose Verify       0817-Med Discontinued  933-Stopped              Dose: 62.5 mg  Freq: DAILY Route: IV  Start: 18 0900   End: 18 0950   Admin Instructions: Give Doses 125mg and less IV Push over 2-3 minutes - reconstitute with 2 mL of bacteriostatic water if no diluent is provided. Doses greater than 125 mg need to be in at least 50 mL IVPB.    Admin. Amount: 62.5 mg = 1 mL Conc: 125 mg/2 mL  Last Admin: 18 0932  Dispense Loc: [facility]  Volume: 2 mL       0829 (62.5 mg)-Given        0923 (62.5 mg)-Given        0925 (62.5 mg)-Given        0932 (62.5 mg)-Given       0950-Med Discontinued          Start: 18 1352   End: 18 0159   Admin Instructions: Sabrina Joshi :  cabinet override  For ordered doses up to 2.5 mg give IV Push slowly titrated over a minimum of 2 minutes. Dilute each 1mg in 4mL of NS.    Administrations Remainin        (7950)-Not Given [C]        0159-Med Discontinued           Dose: 6 mg  Freq: ONCE AT 6PM Route: PO  Start: 18 1800   End: 18 1144   Admin. Amount: 2 tablet (2 × 3 mg tablet)  Dispense Loc:  [facility]  Administrations Remainin          1144-Med Discontinued     Medications 18

## 2018-07-30 NOTE — IP AVS SNAPSHOT
` ` Patient Information     Patient Name Sex     Mahad Ward ([id number]) Male 1945       Room Bed    352      Patient Demographics     Address Phone    [address]  [facility] [address] [phone] (Home)  none (Work)  [phone] (Mobile) *Preferred*      Patient Ethnicity & Race     Ethnic Group Patient Race    American White      Emergency Contact(s)     Name Relation Home Work Mobile    Hortencia Ward Spouse [phone]      Documents on File        Status Date Received Description       Documents for the Patient    Consent for EHR Access Received 18     Insurance Card Received 18 [employer]    Patient ID Received 18     [facility] Specified Other       Privacy Notice - [place] Received 18     Consent for Services - Hospital and Clinic Received 18     HIE Auth Received 18     HIM SHIRA Authorization  [facility]       Documents for the Encounter    CMS IM for Patient Signature Received 18     CMS IM for Patient Signature Received 18 2nd IMM    Discharge Attachment   ENOXAPARIN INJECTION (ENGLISH)    Discharge Attachment   WARFARIN TABLETS (ENGLISH)      Admission Information     Attending Provider Admitting Provider Admission Type Admission Date/Time    Raul Sanford MD Foehrenbacher, Matthew Henry, MD Emergency 18  1505    Discharge Date Hospital Service Auth/Cert Status Service Area     General [facility]    Unit Room/Bed Admission Status        3 MEDICAL SURGICAL  Admission (Confirmed)       Admission     Complaint    COPD exacerbation (H)      Hospital Account     Name Acct ID Class Status Primary Coverage    Mahad Ward [id number] Inpatient Open MEDICARE - MEDICARE FOR HB SUPPLEMENT            Guarantor Account (for Hospital Account #[id number])     Name Relation to Pt Service Area Active? Acct Type    Mahad Ward Self FCS  Yes Personal/Family    Address Phone          [address] [place] [address] [phone](H)              Coverage Information (for Hospital Account #[id number])     1. MEDICARE/MEDICARE FOR HB SUPPLEMENT     F/O Payor/Plan Precert #    MEDICARE/MEDICARE FOR HB SUPPLEMENT     Subscriber Subscriber #    Mahad Ward [id number]    Address Phone    [employer]  [address]  [place] [address] [phone]          2. [employer] PLAN     F/O Payor/Plan Precert #    [employer]REED[employer] PLAN     Subscriber Subscriber #    Mahad Ward [id number]    Address Phone    [address]  [place] [id number] [phone]

## 2018-07-30 NOTE — ED NOTES
[facility]  ED Nurse Handoff Report    Mahad Ward is a 72 year old male   ED Chief complaint: Shortness of Breath  . ED Diagnosis:   Final diagnoses:   None     Allergies:   Allergies   Allergen Reactions     Levaquin [Levofloxacin]        Code Status: Full Code  Activity level - Baseline/Home:  Independent. Activity Level - Current:   Stand with Assist. Lift room needed: No. Bariatric: No   Needed: No   Isolation: No. Infection: Not Applicable.     Vital Signs:   Vitals:    07/30/18 1518 07/30/18 1523 07/30/18 1530 07/30/18 1537   BP:   102/73    Pulse:       Resp:       Temp:       TempSrc:       SpO2: 94% 98% 99% 95%   Weight:       Height:           Cardiac Rhythm:  ,      Pain level:    Patient confused: No. Patient Falls Risk: Yes.   Elimination Status: Has voided   Patient Report - Initial Complaint: SOB. Focused Assessment:  Respiratory - Respiratory WDL: rhythm/pattern; cough Rhythm/Pattern, Respiratory: tachypnea; shortness of breath reported Lung Fields: All Fields Cough Type: productive Throughout All Lung Fields: wheezes expiratory  Tests Performed: Labs, XR. Abnormal Results:   Labs Ordered and Resulted from Time of ED Arrival Up to the Time of Departure from the ED   CBC WITH PLATELETS DIFFERENTIAL - Abnormal; Notable for the following:        Result Value    WBC 12.3 (*)     MCHC 31.2 (*)     Absolute Neutrophil 10.2 (*)     All other components within normal limits   COMPREHENSIVE METABOLIC PANEL - Abnormal; Notable for the following:     Glucose 126 (*)     Urea Nitrogen 38 (*)     Creatinine 1.27 (*)     GFR Estimate 55 (*)     Albumin 2.5 (*)     All other components within normal limits   BLOOD GAS VENOUS AND OXYHGB - Abnormal; Notable for the following:     Ph Venous 7.30 (*)     PCO2 Venous 59 (*)     Bicarbonate Venous 29 (*)     All other components within normal limits   TROPONIN I - Abnormal; Notable for the following:     Troponin I ES 0.256 (*)     All other  components within normal limits   NT PROBNP INPATIENT - Abnormal; Notable for the following:     N-Terminal Pro BNP Inpatient 9383 (*)     All other components within normal limits   ISTAT  GASES LACTATE DEVON POCT - Abnormal; Notable for the following:     Ph Venous 7.30 (*)     PCO2 Venous 58 (*)     All other components within normal limits   BLOOD CULTURE   BLOOD CULTURE     XR Chest 2 Views   Final Result   IMPRESSION: Extensive patchy bilateral interstitial infiltrates.      LENORA WHATLEY MD           Treatments provided: See MAR  Family Comments: Spouse present  OBS brochure/video discussed/provided to patient:  No  ED Medications:   Medications   albuterol (PROVENTIL) continous nebulization 10 mg (10 mg Nebulization New Bag 7/30/18 1542)   azithromycin (ZITHROMAX) 500 mg in sodium chloride 0.9 % 250 mL intermittent infusion (not administered)   ipratropium - albuterol 0.5 mg/2.5 mg/3 mL (DUONEB) neb solution 3 mL (3 mLs Nebulization Given 7/30/18 1458)   0.9% sodium chloride BOLUS (500 mLs Intravenous New Bag 7/30/18 1532)   methylPREDNISolone sodium succinate (solu-MEDROL) injection 125 mg (125 mg Intravenous Given 7/30/18 1534)   cefTRIAXone (ROCEPHIN) 1 g vial to attach to  mL bag for ADULTS or NS 50 mL bag for PEDS (1 g Intravenous New Bag 7/30/18 0537)     Drips infusing:  Yes  For the majority of the shift, the patient's behavior Green. Interventions performed were Call light within reach.     Severe Sepsis OR Septic Shock Diagnosis Present: No  .RECEIVING UNIT ED HANDOFF REVIEW    Above ED Nurse Handoff Report was reviewed: Yes  Reviewed by: Rafaela Tejeda on July 30, 2018 at 6:28 PM       ED Nurse Name/Phone Number: Yani Madison,   4:29 PM

## 2018-07-30 NOTE — PHARMACY-ADMISSION MEDICATION HISTORY
Admission medication history interview status for this patient is complete. See [facility] admission navigator for allergy information, prior to admission medications and immunization status.     Medication history interview source(s):Patient  Medication history resources (including written lists, pill bottles, clinic record):Epic    Changes made to PTA medication list:  Added: Fluticasone/Salmeterol;   Deleted: Naproxen, Flomax, Vit D  Changed: Combivent    Medication reconciliation/reorder completed by provider prior to medication history? No    For patients on insulin therapy: N (Y/N)  Lantus/levemir/NPH/Mix 70/30 dose:   (Y/N) (see Med list for doses)   Sliding scale Novolog Y/N  If Yes, do you have a baseline novolog pre-meal dose:  units with meals  Patients eat three meals a day:   Y/N    How many episodes of hypoglycemia do you have per week: _______  How many missed doses do you have per week: ______  How many times do you check your blood glucose per day: _______   Any Barriers to therapy - Be specific :  cost of medications, comfortable with giving injections (if applicable), comfortable and confident with current diabetes regimen: Y/N ______________      Prior to Admission medications    Medication Sig Last Dose Taking? Auth Provider   Cholecalciferol (VITAMIN D3) 2000 units TABS Take 2,000 Units by mouth every morning 7/29/2018 at am Yes Unknown, Entered By History   Fluticasone-Salmeterol (AIRDUO RESPICLICK) 113-14 MCG/ACT AEPB inhaler Inhale 1 puff into the lungs 2 times daily  Yes Unknown, Entered By History   Ipratropium-Albuterol (COMBIVENT RESPIMAT)  MCG/ACT inhaler Inhale 1 puff into the lungs every 4 hours   Yes Reported, Patient   Multiple Vitamins-Minerals (CENTRUM SILVER) per tablet Take 1 tablet by mouth daily 7/29/2018 at am Yes Reported, Patient

## 2018-07-30 NOTE — IP AVS SNAPSHOT
"          [place] [address] MEDICAL SURGICAL: [phone]                                              INTERAGENCY TRANSFER FORM - LAB / IMAGING / EKG / EMG RESULTS   2018                    Hospital Admission Date: 2018  GREGOR COHEN   : 1945  Sex: Male        Attending Provider: Raul Sanford MD     Allergies:  Levaquin [Levofloxacin]    Infection:  None   Service:  GENERAL MEDI    Ht:  1.803 m (5' 11\")   Wt:  73.7 kg (162 lb 6.4 oz)   Admission Wt:  81.6 kg (180 lb)    BMI:  22.65 kg/m 2   BSA:  1.92 m 2            Patient PCP Information     Provider PCP Type    [facility]         Lab Results - 3 Days      Blood culture ONE site [[id number]]  Resulted: 18 1024, Result status: Preliminary result    Ordering provider: Siena Giordano MD  18 1510 Resulting lab: [facility]    Specimen Information    Type Source Collected On   Blood  18 1539   Comment:  Right Hand          Components       Value Reference Range Flag Lab   Specimen Description Blood Right Hand      Special Requests Aerobic and anaerobic bottles received   226   Culture Micro No growth after 5 days   225            Blood culture ONE site [[id number]]  Resulted: 18 1024, Result status: Preliminary result    Ordering provider: Siena Giordano MD  18 1506 Resulting lab: [facility]    Specimen Information    Type Source Collected On   Blood  18 1505   Comment:  Left Arm          Components       Value Reference Range Flag Lab   Specimen Description Blood Left Arm      Special Requests Aerobic and anaerobic bottles received   226   Culture Micro No growth after 5 days   225            Heparin Xa (10a) Level [[id number]]  Resulted: 18 0635, Result status: Final result    Ordering provider: Devon Poole MD  18 0000 Resulting lab: [facility]    Specimen Information    Type Source " Collected On   Blood  18 0551          Components       Value Reference Range Flag Lab   Heparin 10A Level 0.14 IU/mL  [facility]   Comment:         Therapeutic Range:                UFH: 0.15-0.35 IU/mL for low                     intensity dosing                     0.30-0.70 IU/mL for high                     intensity dosing for                     DVT and PE  Enoxaparin: If administered              once daily with dose              1.5mg/k.0-2.0 IU/mL              If administered              twice daily with dose              1mg/k.60-1.0 IU/mL  This test is not validated for other direct factor X inhibitors (e.g.   rivaroxaban, apixaban, edoxaban, betrixaban, fondaparinux) and should not be   used for monitoring of other medications.  Reviewed, acceptable              INR [[id number]] (Abnormal)  Resulted: 18, Result status: Final result    Ordering provider: Stephane Perez MD  18 0000 Resulting lab: [facility]    Specimen Information    Type Source Collected On   Blood  18 0551          Components       Value Reference Range Flag Lab   INR 1.78 0.86 - 1.14 H [facility]            Platelet count [[id number]]  Resulted: 18 0607, Result status: Final result    Ordering provider: Raul Sanford MD  18 0000 Resulting lab: [facility]    Specimen Information    Type Source Collected On   Blood  18 0551          Components       Value Reference Range Flag Lab   Platelet Count 358 150 - 450 10e9/L  [facility]            Heparin Xa (10a) Level [[id number]]  Resulted: 18 0732, Result status: Final result    Ordering provider: Devon Poole MD  18 0000 Resulting lab: [facility]    Specimen Information    Type Source Collected On   Blood  18 0705          Components       Value Reference Range Flag Lab   Heparin 10A Level 0.29 IU/mL  [facility]   Comment:         Therapeutic Range:                 UFH: 0.15-0.35 IU/mL for low                     intensity dosing                     0.30-0.70 IU/mL for high                     intensity dosing for                     DVT and PE  Enoxaparin: If administered              once daily with dose              1.5mg/k.0-2.0 IU/mL              If administered              twice daily with dose              1mg/k.60-1.0 IU/mL  This test is not validated for other direct factor X inhibitors (e.g.   rivaroxaban, apixaban, edoxaban, betrixaban, fondaparinux) and should not be   used for monitoring of other medications.              INR [[id number]] (Abnormal)  Resulted: 18 0732, Result status: Final result    Ordering provider: Stephane Perez MD  18 0000 Resulting lab: [facility]    Specimen Information    Type Source Collected On   Blood  18 0705          Components       Value Reference Range Flag Lab   INR 1.32 0.86 - 1.14 H [facility]            CBC with platelets [[id number]] (Abnormal)  Resulted: 18 0721, Result status: Final result    Ordering provider: Devon Poole MD  18 0000 Resulting lab: [facility]    Specimen Information    Type Source Collected On   Blood  18 0705          Components       Value Reference Range Flag Lab   WBC 10.2 4.0 - 11.0 10e9/L  Fr[facility]dHs   RBC Count 3.96 4.4 - 5.9 10e12/L L FrRd[facility]   Hemoglobin 11.7 13.3 - 17.7 g/dL L FrRdHs   Hematocrit 37.7 40.0 - 53.0 % L FrRdHs   MCV 95 78 - 100 fl  FrRdHs   MCH 29.5 26.5 - 33.0 pg  FrRdHs   MCHC 31.0 31.5 - 36.5 g/dL L FrRdHs   RDW 13.3 10.0 - 15.0 %  FrRdHs   Platelet Count 371 150 - 450 10e9/L  FrRdHs            INR [[id number]]  Resulted: 18 1141, Result status: Final result    Ordering provider: Devon Poole MD  18 0630 Resulting lab: [facility]    Specimen Information    Type Source Collected On     18 0630          Components       Value Reference Range Flag Lab   INR 1.12 0.86 -  1.14  FrRdHs            Sputum Culture Aerobic Bacterial [[id number]]  Resulted: 08/02/18 1131, Result status: Final result    Ordering provider: Carla Gutiérrez MD  07/31/18 0834 Resulting lab: [facility]    Specimen Information    Type Source Collected On   Sputum  07/31/18 0930          Components       Value Reference Range Flag Lab   Specimen Description Sputum      Culture Micro --   225   Result:         Moderate growth  Normal rohini              CBC with platelets differential [[id number]] (Abnormal)  Resulted: 08/02/18 0919, Result status: Final result    Ordering provider: Carla Gutiérrez MD  08/02/18 0000 Resulting lab: [facility]    Specimen Information    Type Source Collected On   Blood  08/02/18 0630          Components       Value Reference Range Flag Lab   WBC 12.1 4.0 - 11.0 10e9/L H FrRdHs   RBC Count 4.09 4.4 - 5.9 10e12/L L FrRdHs   Hemoglobin 12.2 13.3 - 17.7 g/dL L FrRdHs   Hematocrit 39.3 40.0 - 53.0 % L FrRdHs   MCV 96 78 - 100 fl  FrRdHs   MCH 29.8 26.5 - 33.0 pg  FrRdHs   MCHC 31.0 31.5 - 36.5 g/dL L FrRdHs   RDW 13.9 10.0 - 15.0 %  FrRdHs   Platelet Count 409 150 - 450 10e9/L  Rd   Diff Method Manual Differential   FrRdHs   % Neutrophils 79.0 %  FrRdHs   % Lymphocytes 17.0 %  FrRdHs   % Monocytes 3.0 %  FrRdHs   % Eosinophils 0.0 %  FrRdHs   % Basophils 0.0 %  RdHs   % Myelocytes 1.0 %  RdHs   Absolute Neutrophil 9.6 1.6 - 8.3 10e9/L H FrRdHs   Absolute Lymphocytes 2.1 0.8 - 5.3 10e9/L  FrRdHs   Absolute Monocytes 0.4 0.0 - 1.3 10e9/L  FrRdHs   Absolute Eosinophils 0.0 0.0 - 0.7 10e9/L  FrRdHs   Absolute Basophils 0.0 0.0 - 0.2 10e9/L  FrRdHs   Absolute Myelocytes 0.1 0 10e9/L H FrRdHs   RBC Morphology Consistent with reported results   [facility]   Platelet Estimate Automated count confirmed.  Platelet morphology is normal.   [facility]            Basic metabolic panel [[id number]] (Abnormal)  Resulted: 08/02/18 0723, Result status: Final result     Ordering provider: Carla Gutiérrez MD  18 0000 Resulting lab: [facility]    Specimen Information    Type Source Collected On   Blood  18 0630          Components       Value Reference Range Flag Lab   Sodium 139 133 - 144 mmol/L  FrRdHs   Potassium 4.5 3.4 - 5.3 mmol/L  FrRdHs   Chloride 103 94 - 109 mmol/L  FrRdHs   Carbon Dioxide 33 20 - 32 mmol/L H FrRdHs   Anion Gap 3 3 - 14 mmol/L  FrRdHs   Glucose 80 70 - 99 mg/dL  FrRdHs   Urea Nitrogen 22 7 - 30 mg/dL  FrRdHs   Creatinine 0.69 0.66 - 1.25 mg/dL  FrRdHs   GFR Estimate >90 >60 mL/min/1.7m2  FrRd   Comment:  Non  GFR Calc   GFR Estimate If Black >90 >60 mL/min/1.7m2  [facility]   Comment:  African American GFR Calc   Calcium 8.7 8.5 - 10.1 mg/dL  FrRdHs            Heparin Xa (10a) Level [[id number]]  Resulted: 18, Result status: Final result    Ordering provider: Devon Poole MD  18 0000 Resulting lab: [facility]    Specimen Information    Type Source Collected On   Blood  18 0630          Components       Value Reference Range Flag Lab   Heparin 10A Level 0.32 IU/mL  [facility]   Comment:         Therapeutic Range:                UFH: 0.15-0.35 IU/mL for low                     intensity dosing                     0.30-0.70 IU/mL for high                     intensity dosing for                     DVT and PE  Enoxaparin: If administered              once daily with dose              1.5mg/k.0-2.0 IU/mL              If administered              twice daily with dose              1mg/k.60-1.0 IU/mL  This test is not validated for other direct factor X inhibitors (e.g.   rivaroxaban, apixaban, edoxaban, betrixaban, fondaparinux) and should not be   used for monitoring of other medications.              Heparin 10a Level [[id number]]  Resulted: 18, Result status: Final result    Ordering provider: Raul Sanford MD  18 Resulting lab: [place]  [facility]    Specimen Information    Type Source Collected On   Blood  18 2112          Components       Value Reference Range Flag Lab   Heparin 10A Level 0.31 IU/mL  [facility]   Comment:         Therapeutic Range:                UFH: 0.15-0.35 IU/mL for low                     intensity dosing                     0.30-0.70 IU/mL for high                     intensity dosing for                     DVT and PE  Enoxaparin: If administered              once daily with dose              1.5mg/k.0-2.0 IU/mL              If administered              twice daily with dose              1mg/k.60-1.0 IU/mL  This test is not validated for other direct factor X inhibitors (e.g.   rivaroxaban, apixaban, edoxaban, betrixaban, fondaparinux) and should not be   used for monitoring of other medications.              Respiratory Virus Panel by PCR [[id number]]  Resulted: 18 1416, Result status: Final result    Ordering provider: Carla Gutiérrez MD  18 0834 Resulting lab: [facility]    Specimen Information    Type Source Collected On   Nasal  18 0945          Components       Value Reference Range Flag Lab   Respiratory Virus Source Nasal   [facility]   Influenza A Negative NEG^Negative  75   Influenza A, H1 Negative NEG^Negative  75   Influenza A, H3 Negative NEG^Negative  75   Influenza A 2009 H1N1 Negative NEG^Negative  75   Influenza B Negative NEG^Negative  75   Respiratory Syncytial Virus A Negative NEG^Negative  75   Respiratory Syncytial Virus B Negative NEG^Negative  75   Parainfluenza Virus 1 Negative NEG^Negative  75   Parainfluenza Virus 2 Negative NEG^Negative  75   Parainfluenza Virus 3 Negative NEG^Negative  75   Human Metapneumovirus Negative NEG^Negative  75   Human Rhinovirus Negative NEG^Negative  75   Adenovirus Species B/E Negative NEG^Negative  75   Adenovirus Species C Negative NEG^Negative  75   Respiratory Virus Comment See Comment Below   75    Comment:            The Load DynamiX Respiratory Viral Panel (RVP) is a qualitative, multiplex,   diagnostic test for simultaneous detection and identification of multiple   respiratory viral nucleic acids in individuals exhibiting signs and symptoms   of respiratory infection. It uses innovative eSensor technology to provide   sensitive and specific respiratory virus detection.  The test detects Influenza A (H1 and H3), Influenza A 2009 H1N1, Influenza B,   Respiratory Syncytial Virus A, Respiratory Syncytial Virus B, Parainfluenza   Virus (1, 2 and 3), Human Metapneumovirus, Human Rhinovirus (HRV), Adenovirus   B/E and Adenovirus C.  The assay has received FDA approval for the testing of nasopharyngeal (NP)   swabs only. The [facility] at [facility] [place], has validated the performance   characteristics of the Load DynamiX Respiratory Viral Panel for NP swabs, bronchial   alveolar lavage/wash, nasopharyngeal aspirate/wash and nasal wash.               Heparin 10a Level [[id number]]  Resulted: 18 1410, Result status: Final result    Ordering provider: Raul Sanford MD  18 0800 Resulting lab: [facility]    Specimen Information    Type Source Collected On   Blood  18 1354          Components       Value Reference Range Flag Lab   Heparin 10A Level 0.11 IU/mL  [facility]   Comment:         Therapeutic Range:                UFH: 0.15-0.35 IU/mL for low                     intensity dosing                     0.30-0.70 IU/mL for high                     intensity dosing for                     DVT and PE  Enoxaparin: If administered              once daily with dose              1.5mg/k.0-2.0 IU/mL              If administered              twice daily with dose              1mg/k.60-1.0 IU/mL  This test is not validated for other direct factor X inhibitors (e.g.   rivaroxaban, apixaban, edoxaban, betrixaban,  fondaparinux) and should not be   used for monitoring of other medications.              CBC with platelets differential [[id number]] (Abnormal)  Resulted: 08/01/18 0518, Result status: Final result    Ordering provider: Carla Gutiérrez MD  08/01/18 0001 Resulting lab: [facility]    Specimen Information    Type Source Collected On   Blood  08/01/18 0418          Components       Value Reference Range Flag Lab   WBC 16.2 4.0 - 11.0 10e9/L H FrRdHs   RBC Count 3.82 4.4 - 5.9 10e12/L L FrRdHs   Hemoglobin 11.4 13.3 - 17.7 g/dL L FrRdHs   Hematocrit 37.1 40.0 - 53.0 % L FrRdHs   MCV 97 78 - 100 fl  FrRdHs   MCH 29.8 26.5 - 33.0 pg  FrRdHs   MCHC 30.7 31.5 - 36.5 g/dL L FrRdHs   RDW 14.0 10.0 - 15.0 %  FrRdHs   Platelet Count 398 150 - 450 10e9/L  FrRdHs   Diff Method Automated Method   FrRdHs   % Neutrophils 85.8 %  FrRdHs   % Lymphocytes 8.8 %  FrRdHs   % Monocytes 4.1 %  FrRdHs   % Eosinophils 0.0 %  FrRdHs   % Basophils 0.1 %  FrRdHs   % Immature Granulocytes 1.2 %  FrRdHs   Nucleated RBCs 0 0 /100  FrRdHs   Absolute Neutrophil 13.9 1.6 - 8.3 10e9/L H FrRdHs   Absolute Lymphocytes 1.4 0.8 - 5.3 10e9/L  FrRdHs   Absolute Monocytes 0.7 0.0 - 1.3 10e9/L  FrRdHs   Absolute Eosinophils 0.0 0.0 - 0.7 10e9/L  FrRdHs   Absolute Basophils 0.0 0.0 - 0.2 10e9/L  FrRdHs   Abs Immature Granulocytes 0.2 0 - 0.4 10e9/L  FrRdHs   Absolute Nucleated RBC 0.0   FrRdHs   RBC Morphology Consistent with reported results   [facility]   Platelet Estimate Automated count confirmed.  Platelet morphology is normal.   [facility]            Basic metabolic panel [[id number]] (Abnormal)  Resulted: 08/01/18 0444, Result status: Final result    Ordering provider: Carla Gutiérrez MD  08/01/18 0001 Resulting lab: [facility]    Specimen Information    Type Source Collected On   Blood  08/01/18 0418          Components       Value Reference Range Flag Lab   Sodium 141 133 - 144 mmol/L  FrRdHs   Potassium 4.7 3.4 - 5.3 mmol/L  FrRdHs    Chloride 106 94 - 109 mmol/L  FrRd   Carbon Dioxide 31 20 - 32 mmol/L  FrRdHs   Anion Gap 4 3 - 14 mmol/L  FrRd   Glucose 105 70 - 99 mg/dL H FrRdHs   Urea Nitrogen 34 7 - 30 mg/dL H FrRdHs   Creatinine 0.82 0.66 - 1.25 mg/dL  FrRd   GFR Estimate >90 >60 mL/min/1.7m2  [facility]   Comment:  Non  GFR Calc   GFR Estimate If Black >90 >60 mL/min/1.7m2  [facility]   Comment:  African American GFR Calc   Calcium 8.6 8.5 - 10.1 mg/dL  [facility]            Heparin Xa (10a) Level [[id number]]  Resulted: 18, Result status: Final result    Ordering provider: Devon Poole MD  18 0001 Resulting lab: [facility]    Specimen Information    Type Source Collected On   Blood  18          Components       Value Reference Range Flag Lab   Heparin 10A Level 0.37 IU/mL  [facility]   Comment:         Therapeutic Range:                UFH: 0.15-0.35 IU/mL for low                     intensity dosing                     0.30-0.70 IU/mL for high                     intensity dosing for                     DVT and PE  Enoxaparin: If administered              once daily with dose              1.5mg/k.0-2.0 IU/mL              If administered              twice daily with dose              1mg/k.60-1.0 IU/mL  This test is not validated for other direct factor X inhibitors (e.g.   rivaroxaban, apixaban, edoxaban, betrixaban, fondaparinux) and should not be   used for monitoring of other medications.              Heparin 10a Level [[id number]]  Resulted: 18, Result status: In process    Ordering provider: Raul Sanford MD  184 Resulting lab: [facility]    Specimen Information    Type Source Collected On   Blood  18            Testing Performed By     Lab - Abbreviation Name Director Address Valid Date Range    12 - [facility] Unknown [address] [facility] [address] 05/08/15 1057 - Present    45 - [id number]  Unknown Unknown 01/28/02 0000 - Present    75 - Unknown [facility] Unknown [address] [facility] [address] 01/15/15 1019 - Present    225 - Unknown [facility] Unknown [address] [facility] [address] 12/19/14 0954 - Present    226 - Unknown MICRO RAPID TESTING LAB Unknown [address] [facility] [address] 12/19/14 0955 - Present            Unresulted Labs (24h ago through future)    Start       Ordered    08/03/18 0600  CBC with platelets  (Heparin Treatment, Pharmacy To Dose- [facility])  EVERY THREE DAYS,   Routine     Comments:  Every 3 days while on heparin    07/31/18 2000 08/03/18 0600  INR  (warfarin (COUMADIN) Pharmacy Consult-INITIAL ORDER)  DAILY,   Routine      08/02/18 1029    08/01/18 0600  Platelet count  EVERY 72 HOURS,   Timed     Comments:  If no result is listed, this lab has not been done the past 365 days. LATEST LAB RESULT: Platelet Count (10e9/L)       Date                     Value                 07/31/2018               377              ----------    07/31/18 1817    08/01/18 0600  Heparin Xa (10a) Level  (Heparin Treatment, Pharmacy To Dose- [facility])  DAILY,   Routine      07/31/18 2000    Unscheduled  Heparin Xa (10a) Level  (Heparin Treatment, Pharmacy To Dose- [facility])  CONDITIONAL (SPECIFY),   Routine     Comments:  Release order 6 hours after any heparin dose change    07/31/18 2000    Unscheduled  Heparin Xa (10a) Level  (Heparin Treatment, Pharmacy To Dose- [facility])  CONDITIONAL X 1 STAT,   STAT     Comments:  Release order 6 Hours after heparin is started   Question:  Type of Heparin:  Answer:  Low Molecular Weight Heparin    07/31/18 2152         ECG/EMG Results      Echocardiogram Complete [[id number]]  Resulted: 07/31/18 0958, Result status: In process    Ordering provider: Raul Sanford MD  07/30/18 2033 Performed: 07/31/18 0958 - 07/31/18 0958    Resulting lab: [facility]            "  ECHO COMPLETE WITH OPTISON [[id number]]  Resulted: 18 1000, Result status: Edited Result - FINAL    Ordering provider: Kelsey Sanford MD  18 Resulted by: Noel Fofana MD    Performed: 18 0958 - 18 1027 Resulting lab: RADIOLOGY RESULTS    Narrative:       [id number]  [id number]  [id number]  [id number]^SVETLANA^KELSEY^AMARI           [facility]  Echocardiography Laboratory  [address] [place] [address]        Name: GREGOR COHEN  MRN: [id number]  : 1945  Study Date: 2018 10:00 AM  Age: 72 yrs  Gender: Male  Patient Location: [facility]  Reason For Study: CHF  Ordering Physician: KELSEY SANFORD  Referring Physician: [facility]  Performed By: Yani Obrien RDCS     BSA: 2.0 m2  Height: 71 in  Weight: 167 lb  HR: 81  BP: 111/56 mmHg  _____________________________________________________________________________  __        Procedure  Complete Portable Echo Adult. Contrast Optison.  _____________________________________________________________________________  __        Interpretation Summary     The visual ejection fraction is estimated at 50-55%.  Grade I or early diastolic dysfunction.  Abnormal \"septal bounce\" due to right heart disease/pulm HTN  The right ventricle is mild to moderately dilated.  Moderately decreased right ventricular systolic function  The right atrium is mildly dilated.  There is no color Doppler evidence of an atrial shunt.  Right ventricular systolic pressure could not be approximated due to  inadequate tricuspid regurgitation.  Dilated IVC (>2.5cm) with no respiratory collapse; right atrial pressure is  estimated at >20mmHg.  Borderline aortic root dilatation.  Study most likely c/w acute/chronic cor pulmonale  _____________________________________________________________________________  __        Left Ventricle  The left ventricle is normal in size. There is normal left ventricular " "wall  thickness. The visual ejection fraction is estimated at 50-55%. Grade I or  early diastolic dysfunction. Abnormal \"septal bounce\" due to right heart  disease/pulm HTN. Normal left ventricular wall motion. There is no thrombus  seen in the left ventricle.     Right Ventricle  The right ventricle is mild to moderately dilated. Moderately decreased right  ventricular systolic function.     Atria  Normal left atrial size. The right atrium is mildly dilated. There is no color  Doppler evidence of an atrial shunt.     Mitral Valve  The mitral valve leaflets appear normal. There is no evidence of stenosis,  fluttering, or prolapse.        Tricuspid Valve  Right ventricular systolic pressure could not be approximated due to  inadequate tricuspid regurgitation. There is trace tricuspid regurgitation.  Dilated IVC (>2.5cm) with no respiratory collapse; right atrial pressure is  estimated at >20mmHg.     Aortic Valve  The aortic valve is trileaflet.     Pulmonic Valve  The pulmonic valve is not well seen, but is grossly normal.     Vessels  Borderline aortic root dilatation. Sinus Valsalva 39mm, asc. aorta normal.     Pericardium  The pericardium appears normal.        Rhythm  Sinus rhythm was noted.  _____________________________________________________________________________  __  MMode/2D Measurements & Calculations  IVSd: 0.59 cm     LVIDd: 4.0 cm  LVIDs: 2.9 cm  LVPWd: 0.80 cm  FS: 27.6 %  LV mass(C)d: 78.9 grams  LV mass(C)dI: 40.4 grams/m2  Ao root diam: 3.9 cm  LA dimension: 3.1 cm  LA/Ao: 0.81  LVOT diam: 1.8 cm  LVOT area: 2.6 cm2  LA Volume (BP): 54.8 ml  LA Volume Index (BP): 28.1 ml/m2  RWT: 0.40  TAPSE: 2.2 cm           Doppler Measurements & Calculations  MV E max james: 63.1 cm/sec  MV A max james: 81.0 cm/sec  MV E/A: 0.78  MV dec time: 0.22 sec  E/E' avg: 10.5  Lateral E/e': 9.8  Medial E/e': 11.1           _____________________________________________________________________________  __           Report " approved by: Mariposa Mena 07/31/2018 11:54 AM       1    Type Source Collected On     07/31/18 1000          View Image (below)              Encounter-Level Documents:     There are no encounter-level documents.      Order-Level Documents:     There are no order-level documents.

## 2018-07-30 NOTE — IP AVS SNAPSHOT
"` `           [place] [address] [facility]: [phone]                                              INTERAGENCY TRANSFER FORM - NURSING   2018                    Hospital Admission Date: 2018  GREGOR COHEN   : 1945  Sex: Male        Attending Provider: Raul Sanford MD     Allergies:  Levaquin [Levofloxacin]    Infection:  None   Service:  GENERAL MEDI    Ht:  1.803 m (5' 11\")   Wt:  73.7 kg (162 lb 6.4 oz)   Admission Wt:  81.6 kg (180 lb)    BMI:  22.65 kg/m 2   BSA:  1.92 m 2            Patient PCP Information     Provider PCP Type    [facility]      Current Code Status     Date Active Code Status Order ID Comments User Context       Prior      Code Status History     Date Active Date Inactive Code Status Order ID Comments User Context    2018 10:49 AM  Full Code [id number]  Stephane Perez MD Outpatient    2018  6:54 PM 2018 10:49 AM Full Code [id number]  Michelle Yeh PA-C Inpatient      Advance Directives        Scanned docmt in ACP Activity?           No scanned doc        Hospital Problems as of 2018              Priority Class Noted POA    COPD exacerbation (H) Medium  2018 Unknown    Pneumonia Medium  2018 Unknown      Non-Hospital Problems as of 2018     None      Immunizations     None         END      ASSESSMENT     Discharge Profile Flowsheet     EXPECTED DISCHARGE     Patient's communication style  spoken language (English or Bilingual) 18 0827    Expected Discharge Date  -- (TBD Home/ , O2 [facility]) 18 1512   SKIN      DISCHARGE NEEDS ASSESSMENT     Inspection of bony prominences  Full except (identify areas not inspected) 18 0755    Patient/family verbalizes understanding of discharge plan recommendations?  Yes 18 1423   Full except areas not inspected   Coccyx;Sacrum;Buttock, right;Buttock, left 18 0755    Readmission Within The Last 30 Days  no previous " "admission in last 30 days 07/31/18 1423   Inspection under devices  Full 08/04/18 0755    Equipment Currently Used at Home  none 08/04/18 0920   Skin WDL  ex;characteristics 08/04/18 0755    Transportation Available  family or friend will provide 08/04/18 0920   Skin Temperature  warm 08/04/18 0755    [facility] 07/31/18 1423   Skin Moisture  dry 08/04/18 0755    Primary Care MD Name  [facility] 07/31/18 1423   Skin Elasticity  quick return to original state 08/04/18 0755    GASTROINTESTINAL (ADULT,PEDIATRIC,OB)     Skin Integrity  bruise(s);scab(s);scar(s) 08/04/18 0755    GI WDL  WDL 08/04/18 0755   SAFETY      Last Bowel Movement  08/03/18 08/04/18 0755   Safety WDL  WDL 08/04/18 0755    Passing flatus  yes 08/04/18 0755   Safety Factors  ID band on;upper side rails raised x 2;call light in reach;wheels locked;bed in low position 08/04/18 0755    COMMUNICATION ASSESSMENT     All Alarms  none present 08/04/18 0755                 Assessment WDL (Within Defined Limits) Definitions           Safety WDL     Effective: 09/28/15    Row Information: <b>WDL Definition:</b> Bed in low position, wheels locked; call light in reach; upper side rails up x 2; ID band on<br> <font color=\"gray\"><i>Item=AS safety wdl>>List=AS safety wdl>>Version=F14</i></font>      Skin WDL     Effective: 09/28/15    Row Information: <b>WDL Definition:</b> Warm; dry; intact; elastic; without discoloration; pressure points without redness<br> <font color=\"gray\"><i>Item=AS skin wdl>>List=AS skin wdl>>Version=F14</i></font>      Vitals     Vital Signs Flowsheet     VITAL SIGNS     Pain Intervention(s)  Medication (See eMAR) 08/04/18 0943    Temp  97  F (36.1  C) 08/04/18 0746   Response to Interventions  Relief 08/04/18 1032    Temp src  Oral 08/04/18 0746   ANALGESIA SIDE EFFECTS MONITORING      Resp  18 08/04/18 0746   Side Effects Monitoring: Respiratory Quality  R 08/04/18 1032    Pulse  75 08/04/18 0746   Side " "Effects Monitoring: Respiratory Depth  N 08/04/18 1032    Pulse/Heart Rate Source  Monitor 08/04/18 0048   Side Effects Monitoring: Sedation Level  1 08/04/18 1032    BP  132/75 08/04/18 0746   HEIGHT AND WEIGHT      BP Location  Right arm 08/04/18 0746   Height  1.803 m (5' 11\") 08/03/18 0605    Pain Score  NO PAIN (0) 08/03/18 0319   Height Method  Stated 08/03/18 0605    OXYGEN THERAPY     Weight  73.7 kg (162 lb 6.4 oz) 08/03/18 0605    SpO2  93 % 08/04/18 1107   Weight Method  Standing scale 08/03/18 0605    O2 Device  Nasal cannula 08/04/18 1107   BSA (Calculated - sq m)  1.92 08/03/18 0605    Oxygen Delivery  4 LPM 08/04/18 1107   BMI (Calculated)  22.7 08/03/18 0605    PAIN/COMFORT     POSITIONING      Patient Currently in Pain  yes 08/04/18 0943   Body Position  independently positioning 08/04/18 0755    Preferred Pain Scale  number (Numeric Rating Pain Scale) 08/04/18 0943   Head of Bed (HOB)  HOB at 20-30 degrees 08/04/18 0207    Patient's Stated Pain Goal  No pain 08/04/18 0943   Chair  Upright in chair 08/04/18 0928    0-10 Pain Scale  4 08/04/18 0943   Positioning/Transfer Devices  pillows;in use 08/04/18 0207    Pain Location  Head 08/04/18 0943   DAILY CARE      Pain Orientation  Right;Left 08/04/18 0943   Activity Management  ambulated in castillo 08/04/18 1101    Pain Descriptors  Headache 08/04/18 0943   Activity Assistance Provided  assistance, stand-by 08/04/18 1101            Patient Lines/Drains/Airways Status    Active LINES/DRAINS/AIRWAYS     None            Patient Lines/Drains/Airways Status    Active PICC/CVC     None            Intake/Output Detail Report     Date Intake     Output Net    Shift P.O. I.V. IV Piggyback Total Urine Total       Noc 08/02/18 2300 - 08/03/18 0659 -- -- -- -- 900 900 -900    Day 08/03/18 0700 - 08/03/18 1459 -- -- -- -- 350 350 -350    Liset 08/03/18 1500 - 08/03/18 2259 240 -- -- 240 200 200 40    Noc 08/03/18 2300 - 08/04/18 0659 -- -- -- -- 300 300 -300    Day " 08/04/18 0700 - 08/04/18 1459 -- -- -- -- 250 250 -250      Last Void/BM       Most Recent Value    Urine Occurrence 1 at 08/02/2018 1148    Stool Occurrence 1 at 07/31/2018 1821      Case Management/Discharge Planning     Case Management/Discharge Planning Flowsheet     REFERRAL INFORMATION     Expected Discharge Date  -- (TBD Home/ , O2 [facility]) 08/01/18 1512    Admission Type  inpatient 07/31/18 1423   DISCHARGE PLANNING      Arrived From  admitted as an inpatient 07/31/18 1423   Patient/family verbalizes understanding of discharge plan recommendations?  Yes 07/31/18 1423    Referral Source  case finding;high risk screening 07/31/18 1423   Readmission Within The Last 30 Days  no previous admission in last 30 days 07/31/18 1423    Reason For Consult  care coordination/care conference;discharge planning 07/31/18 1423   Transportation Available  family or friend will provide 08/04/18 0920    Record Reviewed  clinical discipline documentation;history and physical;medical record;plan of care;patient profile 07/31/18 1423   FINAL RESOURCES      CTS Assigned to Case  Sunshine LAM CTS 07/31/18 1423   Equipment Currently Used at Home  none 08/04/18 0920    [facility] 07/31/18 1423   ABUSE RISK SCREEN      Primary Care MD Name  [facility] 07/31/18 1423   QUESTION TO PATIENT:  Has a member of your family or a partner(now or in the past) intimidated, hurt, manipulated, or controlled you in any way?  no 07/30/18 1452    LIVING ENVIRONMENT     QUESTION TO PATIENT: Do you feel safe going back to the place where you are living?  yes 07/30/18 1452    Lives With  spouse 08/04/18 0920   OTHER      Living Arrangements  mobile home 08/04/18 0920   Are you depressed or being treated for depression?  No 07/30/18 2147    COPING/STRESS     HOMICIDE RISK      Major Change/Loss/Stressor  none 07/30/18 2150   Feels Like Hurting Others  no 07/30/18 1452    EXPECTED DISCHARGE

## 2018-07-30 NOTE — IP AVS SNAPSHOT
[place] [address] [facility]    [address]    [facility] [address]    Phone:  [phone]    Fax:  [phone]                                       After Visit Summary   7/30/2018    Mahad Ward    MRN: [id number]           After Visit Summary Signature Page     I have received my discharge instructions, and my questions have been answered. I have discussed any challenges I see with this plan with the nurse or doctor.    ..........................................................................................................................................  Patient/Patient Representative Signature      ..........................................................................................................................................  Patient Representative Print Name and Relationship to Patient    ..................................................               ................................................  Date                                            Time    ..........................................................................................................................................  Reviewed by Signature/Title    ...................................................              ..............................................  Date                                                            Time

## 2018-07-30 NOTE — LETTER
Transition Communication Hand-off for Care Transitions to Next Level of Care Provider    Hand-off for Care Transitions to Next Level of Care Provider  Name: Mahad Ward  : 1945  MRN #: [id number]  Reason for Hospitalization:  Renal insufficiency [N28.9]  Hypoxia [R09.02]  COPD exacerbation (H) [J44.1]  Admit Date/Time: 2018  3:05 PM  Discharge Date: 2018    Reason for Communication Hand-off Referral: Admission diagnoses: COPD    Discharge Plan:  Discharged to: Home with support                   Patient agreeable to post-hospital support suggestions:  Yes    Patient is on new medications:   Yes    MTM follow up recommended: No    Tel-Ass[employer] program:  Ineligible    Follow-up specialty is recommended: No.     Follow-up plan:  No future appointments.    Any outstanding tests or procedures:   Yes. Recommend repeat INR level on Monday, 18 with adjustment of Coumadin dosing per results.     Procedures     Future Labs/Procedures    Oxygen Adult     Comments:    [place] [facility] Order 4-5 liter(s) by nasal cannula pulse dosing with use of portable tank. Expected treatment length is indefinite (99 months).. Test on conserving device as applicable.    Patients who qualify for home O2 coverage under the CMS guidelines require ABG tests or O2 sat readings obtained closest to, but no earlier than 2 days prior to the discharge, as evidence of the need for home oxygen therapy. Testing must be performed while patient is in the chronic stable state. See notes for O2 sats.    I certify that this patient, Mahad Ward has been under my care and that I, or a nurse practitioner or physician's assistant working with me, had a face-to-face encounter that meets the face-to-face encounter requirements with this patient on 2018. The patient, Mahad Ward was evaluated or treated in whole, or in part, for the following medical condition, which necessitates the use of the ordered oxygen. Treatment  Diagnosis: acute on chronic hypoxic respiratory failure with COPD and cor pulmonale    Attending Provider: Stephane Perez MD  Physician signature: See electronic signature associated with these discharge orders  Date of Order: August 4, 2018          Key Recommendations:   Patient currently receives the following services:  Home O2- [facility]       Patient admitted with COPD exacerbation.Patient receiving nebs and steroids in hospital. Patient does not have a neb machine at home. Given and reviewed COPD action sheet with patient. Reinforced to monitor for COPD symptoms daily and utilize action plan for seeking medical care.   Patient will need close monitoring for COPD symptoms, monitor BNP as was elevated 9383, and may need neb machine for home.    [facility] De [facility] oRsa    Communicated handoff via [facility] Mgt to Dr. [facility]'s CC at [phone].     Sent by Mary Obrien RN, BSN, CTS  [facility]  [phone]    AVS/Discharge Summary is the source of truth; this is a helpful guide for improved communication of patient story

## 2018-07-30 NOTE — IP AVS SNAPSHOT
MRN:[id number]                      After Visit Summary   7/30/2018    Mahad Ward    MRN: [id number]           Thank you!     Thank you for choosing [facility] for your care. Our goal is always to provide you with excellent care. Hearing back from our patients is one way we can continue to improve our services. Please take a few minutes to complete the written survey that you may receive in the mail after you visit. If you would like to speak to someone directly about your visit please contact Patient Relations at [phone]. Thank you!          Patient Information     Date Of Birth          1945        Designated Caregiver       Most Recent Value    Caregiver    Will someone help with your care after discharge? yes    Name of designated caregiver Jeanette    Phone number of caregiver [phone]    Caregiver address [place]      About your hospital stay     You were admitted on:  July 30, 2018 You last received care in the:  [place] [address] [facility]    You were discharged on:  August 4, 2018        Reason for your hospital stay       Admitted for increasing SOB, found with COPD in flare, possible pneumonia, acute DVT                  Who to Call     For medical emergencies, please call 911.  For non-urgent questions about your medical care, please call your primary care provider or clinic, [phone]          Attending Provider     Provider Specialty    Siena Giordano MD Emergency Medicine    [facility]       Primary Care Provider Office Phone # Fax #    González Cornelius [phone]      After Care Instructions     Activity       Your activity upon discharge: activity as tolerated            Diet       Follow this diet upon discharge: Orders Placed This Encounter      Combination Diet Regular Diet Adult            Oxygen Adult       Hana Oxygen Order 4-5 liter(s) by nasal cannula pulse dosing with use  of portable tank. Expected treatment length is indefinite (99 months).. Test on conserving device as applicable.    Patients who qualify for home O2 coverage under the CMS guidelines require ABG tests or O2 sat readings obtained closest to, but no earlier than 2 days prior to the discharge, as evidence of the need for home oxygen therapy. Testing must be performed while patient is in the chronic stable state. See notes for O2 sats.    I certify that this patient, Mahad Ward has been under my care and that I, or a nurse practitioner or physician's assistant working with me, had a face-to-face encounter that meets the face-to-face encounter requirements with this patient on 8/4/2018. The patient, Mahad Ward was evaluated or treated in whole, or in part, for the following medical condition, which necessitates the use of the ordered oxygen. Treatment Diagnosis: acute on chronic hypoxic respiratory failure with COPD and cor pulmonale    Attending Provider: Stephane Perez MD  Physician signature: See electronic signature associated with these discharge orders  Date of Order: August 4, 2018                  Follow-up Appointments     Follow-up and recommended labs and tests        Follow up with primary care provider, González Cornelius, within 7 days to evaluate medication change, to evaluate treatment change and for hospital follow- up.  The following labs/tests are recommended: Repeat INR levels this coming Monday, August 6, 2018 and please adjust Coumadin dosing accordingly..                  Warfarin Instruction     You have started taking a medicine called warfarin. This is a blood-thinning medicine (anticoagulant). It helps prevent and treat blood clots.      Before leaving the hospital, make sure you know how much to take and how long to take it.      You will need regular blood tests to make sure your blood is clotting safely. It is very important to see your doctor for regular blood tests.    Talk to  "your doctor before taking any new medicine (this includes over-the-counter drugs and herbal products). Many medicines can interact with warfarin. This may cause more bleeding or too much clotting.     Eating a lot of vitamin K--found in green, leafy vegetables--can change the way warfarin works in your body. Do NOT avoid these foods. Instead, try to eat the same amount each day.     Bleeding is the most common side-effect of warfarin. You may notice bleeding gums, a bloody nose, bruises and bleeding longer when you cut yourself. See a doctor at once if:   o You cough up blood  o You find blood in your stool (poop)  o You have a deep cut, or a cut that bleeds longer than 10 minutes   o You have a bad cut, hard fall, accident or hit your head (go to urgent care or the emergency room).    For women who can get pregnant: This medicine can harm an unborn baby. Be very careful not to get pregnant while taking this medicine. If you think you might be pregnant, call your doctor right away.    For more information, read \"Guide to Warfarin Therapy,  the booklet you received in the hospital.        Pending Results     Date and Time Order Name Status Description    7/30/2018 1510 Blood culture ONE site Preliminary     7/30/2018 1506 Blood culture ONE site Preliminary             Statement of Approval     Ordered          08/04/18 1049  I have reviewed and agree with all the recommendations and orders detailed in this document.  EFFECTIVE NOW     Approved and electronically signed by:  Stephane Perez MD             Admission Information     Date & Time Provider Department Dept. Phone    7/30/2018 Raul Sanford MD [place] [address] [facility] [phone]      Your Vitals Were     Blood Pressure Pulse Temperature Respirations Height Weight    132/75 (BP Location: Right arm) 75 97  F (36.1  C) (Oral) 18 1.803 m (5' 11\") 73.7 kg (162 lb 6.4 oz)    Pulse Oximetry BMI (Body Mass Index)                93% " "22.65 kg/m2          Fontself Information     Fontself lets you send messages to your doctor, view your test results, renew your prescriptions, schedule appointments and more. To sign up, go to [email or web address] . Click on \"Log in\" on the left side of the screen, which will take you to the Welcome page. Then click on \"Sign up Now\" on the right side of the page.     You will be asked to enter the access code listed below, as well as some personal information. Please follow the directions to create your username and password.     Your access code is: [email or web address]  Expires: 2018 11:19 AM     Your access code will  in 90 days. If you need help or a new code, please call your [place] clinic or [phone].        Care EveryWhere ID     This is your Care EveryWhere ID. This could be used by other organizations to access your [place] medical records  [id number]        Equal Access to Services     KAYLEEN FLORES : Hadii lesia alexo Soshiva, waaxda luqadaha, qaybta kaalmada adeegyada, amanda robbins . So [facility] [phone].    ATENCIÓN: Si habla español, tiene a herbert disposición servicios gratuitos de asistencia lingüística. Llame al [phone].    We comply with applicable federal civil rights laws and [place] laws. We do not discriminate on the basis of race, color, national origin, age, disability, sex, sexual orientation, or gender identity.               Review of your medicines      START taking        Dose / Directions    amoxicillin-clavulanate 875-125 MG per tablet   Commonly known as:  AUGMENTIN        Dose:  1 tablet   Start taking on:  2018   Take 1 tablet by mouth 2 times daily for 1 day   Quantity:  2 tablet   Refills:  0       benzonatate 100 MG capsule   Commonly known as:  TESSALON        Dose:  100 mg   Take 1 capsule (100 mg) by mouth 3 times daily as needed for cough   Quantity:  15 capsule   Refills:  0       enoxaparin 80 MG/0.8ML injection   Commonly " known as:  LOVENOX   Used for:  Acute deep vein thrombosis (DVT) of left lower extremity, unspecified vein (H)        Dose:  1 mg/kg   Inject 0.74 mLs (74 mg) Subcutaneous every 12 hours   Quantity:  2 Syringe   Refills:  0       predniSONE 20 MG tablet   Commonly known as:  DELTASONE        Take 2 tabs (40 mg) by mouth daily x 1 day, 1 tabs (20 mg) daily x 2 days, 1/2 tab (10 mg) daily x 2 days.   Quantity:  5 tablet   Refills:  0       warfarin 4 MG tablet   Commonly known as:  COUMADIN   Used for:  Acute deep vein thrombosis (DVT) of left lower extremity, unspecified vein (H)        Dose:  4 mg   Take 1 tablet (4 mg) by mouth daily   Quantity:  7 tablet   Refills:  0         CONTINUE these medicines which have NOT CHANGED        Dose / Directions    CENTRUM SILVER per tablet        Dose:  1 tablet   Take 1 tablet by mouth daily   Refills:  0       COMBIVENT RESPIMAT  MCG/ACT inhaler   Generic drug:  Ipratropium-Albuterol        Dose:  1 puff   Inhale 1 puff into the lungs every 4 hours   Refills:  0       Fluticasone-Salmeterol 113-14 MCG/ACT Aepb inhaler   Commonly known as:  AIRDUO RESPICLICK        Dose:  1 puff   Inhale 1 puff into the lungs 2 times daily   Refills:  0       Vitamin D3 2000 units Tabs        Dose:  2000 Units   Take 2,000 Units by mouth every morning   Refills:  0            Where to get your medicines      These medications were sent to [facility] [address] [address] [address]     Phone:  [phone]     amoxicillin-clavulanate 875-125 MG per tablet    benzonatate 100 MG capsule    enoxaparin 80 MG/0.8ML injection    predniSONE 20 MG tablet    warfarin 4 MG tablet                Protect others around you: Learn how to safely use, store and throw away your medicines at www.disposemymeds.org.        ANTIBIOTIC INSTRUCTION     You've Been Prescribed an Antibiotic - Now What?  Your healthcare team thinks that you  or your loved one might have an infection. Some infections can be treated with antibiotics, which are powerful, life-saving drugs. Like all medications, antibiotics have side effects and should only be used when necessary. There are some important things you should know about your antibiotic treatment.      Your healthcare team may run tests before you start taking an antibiotic.    Your team may take samples (e.g., from your blood, urine or other areas) to run tests to look for bacteria. These test can be important to determine if you need an antibiotic at all and, if you do, which antibiotic will work best.      Within a few days, your healthcare team might change or even stop your antibiotic.    Your team may start you on an antibiotic while they are working to find out what is making you sick.    Your team might change your antibiotic because test results show that a different antibiotic would be better to treat your infection.    In some cases, once your team has more information, they learn that you do not need an antibiotic at all. They may find out that you don't have an infection, or that the antibiotic you're taking won't work against your infection. For example, an infection caused by a virus can't be treated with antibiotics. Staying on an antibiotic when you don't need it is more likely to be harmful than helpful.      You may experience side effects from your antibiotic.    Like all medications, antibiotics have side effects. Some of these can be serious.    Let you healthcare team know if you have any known allergies when you are admitted to the hospital.    One significant side effect of nearly all antibiotics is the risk of severe and sometimes deadly diarrhea caused by Clostridium difficile (C. Difficile). This occurs when a person takes antibiotics because some good germs are destroyed. Antibiotic use allows C. diificile to take over, putting patients at high risk for this serious infection.    As  a patient or caregiver, it is important to understand your or your loved one's antibiotic treatment. It is especially important for caregivers to speak up when patients can't speak for themselves. Here are some important questions to ask your healthcare team.    What infection is this antibiotic treating and how do you know I have that infection?    What side effects might occur from this antibiotic?    How long will I need to take this antibiotic?    Is it safe to take this antibiotic with other medications or supplements (e.g., vitamins) that I am taking?     Are there any special directions I need to know about taking this antibiotic? For example, should I take it with food?    How will I be monitored to know whether my infection is responding to the antibiotic?    What tests may help to make sure the right antibiotic is prescribed for me?      Information provided by:  www.cdc.gov/getsmart  U.S. Department of Health and Human Services  Centers for disease Control and Prevention  National Center for Emerging and Zoonotic Infectious Diseases  Division of Healthcare Quality Promotion             Medication List: This is a list of all your medications and when to take them. Check marks below indicate your daily home schedule. Keep this list as a reference.      Medications           Morning Afternoon Evening Bedtime As Needed    amoxicillin-clavulanate 875-125 MG per tablet   Commonly known as:  AUGMENTIN   Take 1 tablet by mouth 2 times daily for 1 day   Start taking on:  8/5/2018            8 AM.           8 PM.               benzonatate 100 MG capsule   Commonly known as:  TESSALON   Take 1 capsule (100 mg) by mouth 3 times daily as needed for cough   Last time this was given:  100 mg on 8/4/2018 12:50 PM                                            CENTRUM SILVER per tablet   Take 1 tablet by mouth daily   Next Dose Due:  Tomorrow AM, 8/5.                                   COMBIVENT RESPIMAT  MCG/ACT inhaler    Inhale 1 puff into the lungs every 4 hours   Generic drug:  Ipratropium-Albuterol                                   enoxaparin 80 MG/0.8ML injection   Commonly known as:  LOVENOX   Inject 0.74 mLs (74 mg) Subcutaneous every 12 hours   Last time this was given:  70 mg on 8/4/2018  9:29 AM   Next Dose Due:  Tonight at 8 PM, 8/4.            8 AM.           8 PM.               Fluticasone-Salmeterol 113-14 MCG/ACT Aepb inhaler   Commonly known as:  AIRDUO RESPICLICK   Inhale 1 puff into the lungs 2 times daily                                      predniSONE 20 MG tablet   Commonly known as:  DELTASONE   Take 2 tabs (40 mg) by mouth daily x 1 day, 1 tabs (20 mg) daily x 2 days, 1/2 tab (10 mg) daily x 2 days.   Last time this was given:  40 mg on 8/4/2018  7:48 AM   Next Dose Due:  Taper per protocol. First dose 8/5, AM.                                 Vitamin D3 2000 units Tabs   Take 2,000 Units by mouth every morning   Next Dose Due:  This evening, 8/4.                                   warfarin 4 MG tablet   Commonly known as:  COUMADIN   Take 1 tablet (4 mg) by mouth daily   Last time this was given:  5 mg on 8/3/2018  5:17 PM   Next Dose Due:  Tonight, 8/4 at 5pm.                                             More Information        Enoxaparin injection  Brand Name: Lovenox  What is this medicine?  ENOXAPARIN (ee nox a PA rin) is used after knee, hip, or abdominal surgeries to prevent blood clotting. It is also used to treat existing blood clots in the lungs or in the veins.  How should I use this medicine?  This medicine is for injection under the skin. It is usually given by a health-care professional. You or a family member may be trained on how to give the injections. If you are to give yourself injections, make sure you understand how to use the syringe, measure the dose if necessary, and give the injection. To avoid bruising, do not rub the site where this medicine has been injected. Do not take your medicine  more often than directed. Do not stop taking except on the advice of your doctor or health care professional.  Make sure you receive a puncture-resistant container to dispose of the needles and syringes once you have finished with them. Do not reuse these items. Return the container to your doctor or health care professional for proper disposal.  Talk to your pediatrician regarding the use of this medicine in children. Special care may be needed.  What side effects may I notice from receiving this medicine?  Side effects that you should report to your doctor or health care professional as soon as possible:    allergic reactions like skin rash, itching or hives, swelling of the face, lips, or tongue    feeling faint or lightheaded, falls    signs and symptoms of bleeding such as bloody or black, tarry stools; red or dark-brown urine; spitting up blood or brown material that looks like coffee grounds; red spots on the skin; unusual bruising or bleeding from the eye, gums, or nose  Side effects that usually do not require medical attention (report to your doctor or health care professional if they continue or are bothersome):    pain, redness, or irritation at site where injected  What may interact with this medicine?      aspirin and aspirin-like medicines    certain medicines that treat or prevent blood clots    dipyridamole    NSAIDs, medicines for pain and inflammation, like ibuprofen or naproxen  What if I miss a dose?  If you miss a dose, take it as soon as you can. If it is almost time for your next dose, take only that dose. Do not take double or extra doses.  Where should I keep my medicine?  Keep out of the reach of children.  Store at room temperature between 15 and 30 degrees C (59 and 86 degrees F). Do not freeze. If your injections have been specially prepared, you may need to store them in the refrigerator. Ask your pharmacist. Throw away any unused medicine after the expiration date.  What should I tell  my health care provider before I take this medicine?  They need to know if you have any of these conditions:    bleeding disorders, hemorrhage, or hemophilia    infection of the heart or heart valves    kidney or liver disease    previous stroke    prosthetic heart valve    recent surgery or delivery of a baby    ulcer in the stomach or intestine, diverticulitis, or other bowel disease    an unusual or allergic reaction to enoxaparin, heparin, pork or pork products, other medicines, foods, dyes, or preservatives    pregnant or trying to get pregnant    breast-feeding  What should I watch for while using this medicine?  Visit your doctor or health care professional for regular checks on your progress. Your condition will be monitored carefully while you are receiving this medicine.  Notify your doctor or health care professional and seek emergency treatment if you develop breathing problems; changes in vision; chest pain; severe, sudden headache; pain, swelling, warmth in the leg; trouble speaking; sudden numbness or weakness of the face, arm, or leg. These can be signs that your condition has gotten worse.  If you are going to have surgery, tell your doctor or health care professional that you are taking this medicine.  Do not stop taking this medicine without first talking to your doctor. Be sure to refill your prescription before you run out of medicine.  Avoid sports and activities that might cause injury while you are using this medicine. Severe falls or injuries can cause unseen bleeding. Be careful when using sharp tools or knives. Consider using an electric razor. Take special care brushing or flossing your teeth. Report any injuries, bruising, or red spots on the skin to your doctor or health care professional.  NOTE:This sheet is a summary. It may not cover all possible information. If you have questions about this medicine, talk to your doctor, pharmacist, or health care provider. Copyright  2018  [employer]                Warfarin tablets  Brand Names: Coumadin, Jantoven  What is this medicine?  WARFARIN (WAR far in) is an anticoagulant. It is used to treat or prevent clots in the veins, arteries, lungs, or heart.  How should I use this medicine?  Take this medicine by mouth with a glass of water. Follow the directions on the prescription label. You can take this medicine with or without food. Take your medicine at the same time each day. Do not take it more often than directed. Do not stop taking except on your doctor's advice. Stopping this medicine may increase your risk of a blood clot. Be sure to refill your prescription before you run out of medicine.  If your doctor or healthcare professional calls to change your dose, write down the dose and any other instructions. Always read the dose and instructions back to him or her to make sure you understand them. Tell your doctor or healthcare professional what strength of tablets you have on hand. Ask how many tablets you should take to equal your new dose. Write the date on the new instructions and keep them near your medicine. If you are told to stop taking your medicine until your next blood test, call your doctor or healthcare professional if you do not hear anything within 24 hours of the test to find out your new dose or when to restart your prior dose.  A special MedGuide will be given to you by the pharmacist with each prescription and refill. Be sure to read this information carefully each time.  Talk to your pediatrician regarding the use of this medicine in children. Special care may be needed.  What side effects may I notice from receiving this medicine?  Side effects that you should report to your doctor or health care professional as soon as possible:    allergic reactions like skin rash, itching or hives, swelling of the face, lips, or tongue    breathing problems    chest pain    dizziness    headache    heavy menstrual bleeding or vaginal  bleeding    pain in the lower back or side    painful, blue or purple toes    painful skin ulcers that do not go away    signs and symptoms of bleeding such as bloody or black, tarry stools; red or dark-brown urine; spitting up blood or brown material that looks like coffee grounds; red spots on the skin; unusual bruising or bleeding from the eye, gums, or nose    stomach pain    unusually weak or tired  Side effects that usually do not require medical attention (report to your doctor or health care professional if they continue or are bothersome):    diarrhea    hair loss  What may interact with this medicine?  Do not take this medicine with any of the following medications:    agents that prevent or dissolve blood clots    aspirin or other salicylates    danshen    dextrothyroxine    mifepristone    Chaparrita's Wort    red yeast rice  This medicine may also interact with the following medications:    acetaminophen    agents that lower cholesterol    alcohol    allopurinol    amiodarone    antibiotics or medicines for treating bacterial, fungal or viral infections    azathioprine    barbiturate medicines for inducing sleep or treating seizures    certain medicines for diabetes    certain medicines for heart rhythm problems    certain medicines for hepatitis C virus infections like daclatasvir, dasabuvir; ombitasvir; paritaprevir; ritonavir, elbasvir; grazoprevir, ledipasvir; sofosbuvir, simeprevir, sofosbuvir, sofosbuvir; velpatasvir, sofosbuvir; velpatasvir; voxilaprevir    certain medicines for high blood pressure    chloral hydrate    cisapride    disulfiram    female hormones, including contraceptive or birth control pills    general anesthetics    herbal or dietary products like garlic, ginkgo, ginseng, green tea, or kava kava    influenza virus vaccine    male hormones    medicines for mental depression or psychosis    medicines for some types of cancer    medicines for stomach  problems    methylphenidate    NSAIDs, medicines for pain and inflammation, like ibuprofen or naproxen    propoxyphene    quinidine, quinine    raloxifene    seizure or epilepsy medicine like carbamazepine, phenytoin, and valproic acid    steroids like cortisone and prednisone    tamoxifen    thyroid medicine    tramadol    vitamin c, vitamin e, and vitamin K    zafirlukast    zileuton  What if I miss a dose?  It is important not to miss a dose. If you miss a dose, call your healthcare provider. Take the dose as soon as possible on the same day. If it is almost time for your next dose, take only that dose. Do not take double or extra doses to make up for a missed dose.  Where should I keep my medicine?  Keep out of the reach of children.  Store at room temperature between 15 and 30 degrees C (59 and 86 degrees F). Protect from light. Throw away any unused medicine after the expiration date. Do not flush down the toilet.  What should I tell my health care provider before I take this medicine?  They need to know if you have any of these conditions:    alcoholism    anemia    bleeding disorders    cancer    diabetes    heart disease    high blood pressure    history of bleeding in the gastrointestinal tract    history of stroke or other brain injury or disease    kidney or liver disease    protein C deficiency    protein S deficiency    psychosis or dementia    recent injury, recent or planned surgery or procedure    an unusual or allergic reaction to warfarin, other medicines, foods, dyes, or preservatives    pregnant or trying to get pregnant    breast-feeding  What should I watch for while using this medicine?  Visit your doctor or health care professional for regular checks on your progress. You will need to have a blood test called a PT/INR regularly. The PT/INR blood test is done to make sure you are getting the right dose of this medicine. It is important to not miss your appointment for the blood tests. When  you first start taking this medicine, these tests are done often. Once the correct dose is determined and you take your medicine properly, these tests can be done less often.  Notify your doctor or health care professional and seek emergency treatment if you develop breathing problems; changes in vision; chest pain; severe, sudden headache; pain, swelling, warmth in the leg; trouble speaking; sudden numbness or weakness of the face, arm or leg. These can be signs that your condition has gotten worse.  While you are taking this medicine, carry an identification card with your name, the name and dose of medicine(s) being used, and the name and phone number of your doctor or health care professional or person to contact in an emergency.  Do not start taking or stop taking any medicines or over-the-counter medicines except on the advice of your doctor or health care professional.  You should discuss your diet with your doctor or health care professional. Do not make major changes in your diet. Vitamin K can affect how well this medicine works. Many foods contain vitamin K. It is important to eat a consistent amount of foods with vitamin K. Other foods with vitamin K that you should eat in consistent amounts are asparagus, basil, black eyed peas, broccoli, brussel sprouts, cabbage, green onions, green tea, parsley, green leafy vegetables like beet greens, eric greens, kale, spinach, turnip greens, or certain lettuces like green leaf or hayley.  This medicine can cause birth defects or bleeding in an unborn child. Women of childbearing age should use effective birth control while taking this medicine. If a woman becomes pregnant while taking this medicine, she should discuss the potential risks and her options with her health care professional.  Avoid sports and activities that might cause injury while you are using this medicine. Severe falls or injuries can cause unseen bleeding. Be careful when using sharp tools or  knives. Consider using an electric razor. Take special care brushing or flossing your teeth. Report any injuries, bruising, or red spots on the skin to your doctor or health care professional.  If you have an illness that causes vomiting, diarrhea, or fever for more than a few days, contact your doctor. Also check with your doctor if you are unable to eat for several days. These problems can change the effect of this medicine.  Even after you stop taking this medicine, it takes several days before your body recovers its normal ability to clot blood. Ask your doctor or health care professional how long you need to be careful. If you are going to have surgery or dental work, tell your doctor or health care professional that you have been taking this medicine.  NOTE:This sheet is a summary. It may not cover all possible information. If you have questions about this medicine, talk to your doctor, pharmacist, or health care provider. Copyright  2018 [employer]

## 2018-07-31 ENCOUNTER — APPOINTMENT (OUTPATIENT)
Dept: CT IMAGING | Facility: CLINIC | Age: 73
DRG: 193 | End: 2018-07-31
Attending: INTERNAL MEDICINE
Payer: MEDICARE

## 2018-07-31 ENCOUNTER — APPOINTMENT (OUTPATIENT)
Dept: ULTRASOUND IMAGING | Facility: CLINIC | Age: 73
DRG: 193 | End: 2018-07-31
Attending: INTERNAL MEDICINE
Payer: MEDICARE

## 2018-07-31 ENCOUNTER — APPOINTMENT (OUTPATIENT)
Dept: CARDIOLOGY | Facility: CLINIC | Age: 73
DRG: 193 | End: 2018-07-31
Attending: INTERNAL MEDICINE
Payer: MEDICARE

## 2018-07-31 LAB
ANION GAP SERPL CALCULATED.3IONS-SCNC: 6 MMOL/L (ref 3–14)
BUN SERPL-MCNC: 30 MG/DL (ref 7–30)
CALCIUM SERPL-MCNC: 8.4 MG/DL (ref 8.5–10.1)
CHLORIDE SERPL-SCNC: 109 MMOL/L (ref 94–109)
CO2 SERPL-SCNC: 27 MMOL/L (ref 20–32)
CREAT SERPL-MCNC: 0.83 MG/DL (ref 0.66–1.25)
ERYTHROCYTE [DISTWIDTH] IN BLOOD BY AUTOMATED COUNT: 14 % (ref 10–15)
GFR SERPL CREATININE-BSD FRML MDRD: >90 ML/MIN/1.7M2
GLUCOSE SERPL-MCNC: 134 MG/DL (ref 70–99)
GRAM STN SPEC: NORMAL
HCT VFR BLD AUTO: 38.7 % (ref 40–53)
HGB BLD-MCNC: 11.9 G/DL (ref 13.3–17.7)
INTERPRETATION ECG - MUSE: NORMAL
INTERPRETATION ECG - MUSE: NORMAL
LACTATE BLD-SCNC: 1.3 MMOL/L (ref 0.7–2)
Lab: NORMAL
MAGNESIUM SERPL-MCNC: 2.5 MG/DL (ref 1.6–2.3)
MCH RBC QN AUTO: 29.8 PG (ref 26.5–33)
MCHC RBC AUTO-ENTMCNC: 30.7 G/DL (ref 31.5–36.5)
MCV RBC AUTO: 97 FL (ref 78–100)
PLATELET # BLD AUTO: 377 10E9/L (ref 150–450)
POTASSIUM SERPL-SCNC: 4.8 MMOL/L (ref 3.4–5.3)
RBC # BLD AUTO: 4 10E12/L (ref 4.4–5.9)
S PNEUM AG SPEC QL: NORMAL
S PNEUM AG SPEC QL: NORMAL
SODIUM SERPL-SCNC: 142 MMOL/L (ref 133–144)
SPECIMEN SOURCE: NORMAL
TSH SERPL DL<=0.005 MIU/L-ACNC: 0.45 MU/L (ref 0.4–4)
WBC # BLD AUTO: 13.7 10E9/L (ref 4–11)

## 2018-07-31 PROCEDURE — 93971 EXTREMITY STUDY: CPT | Mod: LT

## 2018-07-31 PROCEDURE — 93005 ELECTROCARDIOGRAM TRACING: CPT

## 2018-07-31 PROCEDURE — 84443 ASSAY THYROID STIM HORMONE: CPT | Performed by: INTERNAL MEDICINE

## 2018-07-31 PROCEDURE — 93306 TTE W/DOPPLER COMPLETE: CPT | Mod: 26 | Performed by: INTERNAL MEDICINE

## 2018-07-31 PROCEDURE — 80048 BASIC METABOLIC PNL TOTAL CA: CPT | Performed by: INTERNAL MEDICINE

## 2018-07-31 PROCEDURE — 71260 CT THORAX DX C+: CPT

## 2018-07-31 PROCEDURE — 25000132 ZZH RX MED GY IP 250 OP 250 PS 637: Mod: GY | Performed by: INTERNAL MEDICINE

## 2018-07-31 PROCEDURE — 87205 SMEAR GRAM STAIN: CPT | Performed by: INTERNAL MEDICINE

## 2018-07-31 PROCEDURE — 40000275 ZZH STATISTIC RCP TIME EA 10 MIN

## 2018-07-31 PROCEDURE — 94640 AIRWAY INHALATION TREATMENT: CPT | Mod: 76

## 2018-07-31 PROCEDURE — 36415 COLL VENOUS BLD VENIPUNCTURE: CPT | Performed by: INTERNAL MEDICINE

## 2018-07-31 PROCEDURE — 87070 CULTURE OTHR SPECIMN AEROBIC: CPT | Performed by: INTERNAL MEDICINE

## 2018-07-31 PROCEDURE — 87899 AGENT NOS ASSAY W/OPTIC: CPT | Performed by: INTERNAL MEDICINE

## 2018-07-31 PROCEDURE — 25000128 H RX IP 250 OP 636: Performed by: PHYSICIAN ASSISTANT

## 2018-07-31 PROCEDURE — 12000007 ZZH R&B INTERMEDIATE

## 2018-07-31 PROCEDURE — 93010 ELECTROCARDIOGRAM REPORT: CPT | Performed by: INTERNAL MEDICINE

## 2018-07-31 PROCEDURE — 25500064 ZZH RX 255 OP 636: Performed by: INTERNAL MEDICINE

## 2018-07-31 PROCEDURE — 99233 SBSQ HOSP IP/OBS HIGH 50: CPT | Performed by: INTERNAL MEDICINE

## 2018-07-31 PROCEDURE — 25000125 ZZHC RX 250: Performed by: PHYSICIAN ASSISTANT

## 2018-07-31 PROCEDURE — 25000128 H RX IP 250 OP 636: Performed by: INTERNAL MEDICINE

## 2018-07-31 PROCEDURE — 83605 ASSAY OF LACTIC ACID: CPT | Performed by: INTERNAL MEDICINE

## 2018-07-31 PROCEDURE — A9270 NON-COVERED ITEM OR SERVICE: HCPCS | Mod: GY | Performed by: INTERNAL MEDICINE

## 2018-07-31 PROCEDURE — 85027 COMPLETE CBC AUTOMATED: CPT | Performed by: INTERNAL MEDICINE

## 2018-07-31 PROCEDURE — A9270 NON-COVERED ITEM OR SERVICE: HCPCS | Mod: GY | Performed by: PHYSICIAN ASSISTANT

## 2018-07-31 PROCEDURE — 25000132 ZZH RX MED GY IP 250 OP 250 PS 637: Mod: GY | Performed by: PHYSICIAN ASSISTANT

## 2018-07-31 PROCEDURE — 83735 ASSAY OF MAGNESIUM: CPT | Performed by: INTERNAL MEDICINE

## 2018-07-31 PROCEDURE — 87633 RESP VIRUS 12-25 TARGETS: CPT | Performed by: INTERNAL MEDICINE

## 2018-07-31 PROCEDURE — 94640 AIRWAY INHALATION TREATMENT: CPT

## 2018-07-31 RX ORDER — IOPAMIDOL 755 MG/ML
500 INJECTION, SOLUTION INTRAVASCULAR ONCE
Status: COMPLETED | OUTPATIENT
Start: 2018-07-31 | End: 2018-07-31

## 2018-07-31 RX ORDER — GUAIFENESIN 600 MG/1
600 TABLET, EXTENDED RELEASE ORAL 2 TIMES DAILY
Status: DISCONTINUED | OUTPATIENT
Start: 2018-07-31 | End: 2018-08-04 | Stop reason: HOSPADM

## 2018-07-31 RX ORDER — GUAIFENESIN 600 MG/1
600 TABLET, EXTENDED RELEASE ORAL 2 TIMES DAILY
Status: DISCONTINUED | OUTPATIENT
Start: 2018-07-31 | End: 2018-07-31

## 2018-07-31 RX ORDER — FUROSEMIDE 10 MG/ML
20 INJECTION INTRAMUSCULAR; INTRAVENOUS ONCE
Status: COMPLETED | OUTPATIENT
Start: 2018-07-31 | End: 2018-07-31

## 2018-07-31 RX ORDER — IOPAMIDOL 755 MG/ML
500 INJECTION, SOLUTION INTRAVASCULAR ONCE
Status: DISCONTINUED | OUTPATIENT
Start: 2018-07-31 | End: 2018-08-04 | Stop reason: HOSPADM

## 2018-07-31 RX ADMIN — ACETAMINOPHEN 650 MG: 325 TABLET, FILM COATED ORAL at 20:16

## 2018-07-31 RX ADMIN — HUMAN ALBUMIN MICROSPHERES AND PERFLUTREN 2 ML: 10; .22 INJECTION, SOLUTION INTRAVENOUS at 10:28

## 2018-07-31 RX ADMIN — FUROSEMIDE 20 MG: 10 INJECTION, SOLUTION INTRAVENOUS at 17:27

## 2018-07-31 RX ADMIN — IPRATROPIUM BROMIDE AND ALBUTEROL SULFATE 3 ML: .5; 3 SOLUTION RESPIRATORY (INHALATION) at 07:35

## 2018-07-31 RX ADMIN — METHYLPREDNISOLONE SODIUM SUCCINATE 62.5 MG: 125 INJECTION, POWDER, FOR SOLUTION INTRAMUSCULAR; INTRAVENOUS at 08:29

## 2018-07-31 RX ADMIN — Medication 6000 UNITS: at 21:32

## 2018-07-31 RX ADMIN — GUAIFENESIN 600 MG: 600 TABLET, EXTENDED RELEASE ORAL at 08:29

## 2018-07-31 RX ADMIN — IPRATROPIUM BROMIDE AND ALBUTEROL SULFATE 3 ML: .5; 3 SOLUTION RESPIRATORY (INHALATION) at 13:04

## 2018-07-31 RX ADMIN — GUAIFENESIN 600 MG: 600 TABLET, EXTENDED RELEASE ORAL at 05:41

## 2018-07-31 RX ADMIN — SODIUM CHLORIDE 1000 ML: 9 INJECTION, SOLUTION INTRAVENOUS at 05:14

## 2018-07-31 RX ADMIN — IPRATROPIUM BROMIDE AND ALBUTEROL SULFATE 3 ML: .5; 3 SOLUTION RESPIRATORY (INHALATION) at 23:19

## 2018-07-31 RX ADMIN — ACETAMINOPHEN 650 MG: 325 TABLET, FILM COATED ORAL at 15:52

## 2018-07-31 RX ADMIN — SODIUM CHLORIDE 81 ML: 900 INJECTION, SOLUTION INTRAVENOUS at 21:15

## 2018-07-31 RX ADMIN — ENOXAPARIN SODIUM 40 MG: 40 INJECTION SUBCUTANEOUS at 17:27

## 2018-07-31 RX ADMIN — IPRATROPIUM BROMIDE AND ALBUTEROL SULFATE 3 ML: .5; 3 SOLUTION RESPIRATORY (INHALATION) at 19:45

## 2018-07-31 RX ADMIN — CEFTRIAXONE SODIUM 1 G: 1 INJECTION, POWDER, FOR SOLUTION INTRAMUSCULAR; INTRAVENOUS at 15:52

## 2018-07-31 RX ADMIN — HEPARIN SODIUM 1350 UNITS/HR: 10000 INJECTION, SOLUTION INTRAVENOUS at 21:31

## 2018-07-31 RX ADMIN — GUAIFENESIN 600 MG: 600 TABLET, EXTENDED RELEASE ORAL at 21:36

## 2018-07-31 RX ADMIN — ACETAMINOPHEN 650 MG: 325 TABLET, FILM COATED ORAL at 08:41

## 2018-07-31 RX ADMIN — AZITHROMYCIN MONOHYDRATE 250 MG: 250 TABLET ORAL at 08:28

## 2018-07-31 RX ADMIN — IOPAMIDOL 64 ML: 755 INJECTION, SOLUTION INTRAVENOUS at 21:16

## 2018-07-31 ASSESSMENT — ACTIVITIES OF DAILY LIVING (ADL)
ADLS_ACUITY_SCORE: 11
ADLS_ACUITY_SCORE: 13
ADLS_ACUITY_SCORE: 11

## 2018-07-31 NOTE — PROGRESS NOTES
Cross Coverage Note    Paged by nursing. Patient converted briefly into AFib with RVR, rates in the 160's. Asymptomatic but BP decreased to 89 systolic.    Came to floor, pt already back in NSR in 110's. 02 was a little low at 88% so I have increased oxygen, ordered 1L of NS, will do morning labs stat and add magnesium, will follow

## 2018-07-31 NOTE — PLAN OF CARE
"Problem: Patient Care Overview  Goal: Plan of Care/Patient Progress Review  Outcome: No Change  A&Ox4, VSS, O2 4L/NC sats mid 90's, declined attempts to wean today stating \"I have to work too hard with it any lower\".  LS with crackles/wheezes.  IV Lasix 20mg given, instructed to use urinal to monitor output. Tele: SR with elevated T wave.  C/o headache with decrease in pain after tylenol x2 today, denies other pain.  IV Rocephin as ordered.  Up in room independently.  Tolerating diet with good appetite.  LLE ultra sound in progress.  Will continue to monitor and continue with plan of care.      "

## 2018-07-31 NOTE — PLAN OF CARE
Problem: Patient Care Overview  Goal: Plan of Care/Patient Progress Review  Pt a/o x4, up with SBA.  Frequent productive cough, SOB on exertion.  92% on 4L NC.  Tele SR/ST.  Not c/o any pain.  Scheduled nebs.  Cr 1.27. Trops 0.256, recheck scheduled for 2300.  Will continue POC.

## 2018-07-31 NOTE — PLAN OF CARE
Problem: Chronic Respiratory Difficulty Comorbidity  Goal: Chronic Respiratory Difficulty  Patient comorbidity will be monitored for signs and symptoms of Respiratory Difficulty (Chronic) condition.  Problems will be absent, minimized or managed by discharge/transition of care.   Outcome: No Change  Pt is alert and orientated. Lactic 1.3. O2 95 on 3L. Frequent productive cough. Pt had brief episode of A-fib RVR, converted back to SR. MD paged. Fluid bolus infused, and mucinex given.

## 2018-07-31 NOTE — PROGRESS NOTES
"[facility] RCAT     Date:7/30/2018  Admission Dx:COPD Exacerbation  Pulmonary History:COPD  Home Nebulizer/MDI Use:Combivent Q4  Home Oxygen:2L  Acuity Level (RCAT flow sheet):2  Aerosol Therapy initiated:Douneb Q4, Alb Q2 prn      Pulmonary Hygiene initiated:Deep breath and coughing techniques      Volume Expansion initiated:IS      Current Oxygen Requirements:3L  Current SpO2:96%    Re-evaluation date:8/2/2018    Patient Education:Education performed with patient in regards to indications/benefits of bronchodilators. Will continue to educate with patient as needed.         See \"RT Assessments\" flow sheet for patient assessment scoring and Acuity Level Details.             "

## 2018-07-31 NOTE — PROGRESS NOTES
Discharge Planner   Discharge Plans in progress: Yes, anticipate DC home  Barriers to discharge plan: None  Follow up plan: Follow up for discharge medication needs and changes (nebs).        Entered by: Sunshine De La Rosa 07/31/2018 2:38 PM

## 2018-07-31 NOTE — PROGRESS NOTES
"Attestation:    Patient seen and examined, discussed with CRISTOFER Azar.  In brief, Bossman Ward is a pleasant 72-year-old man who quit smoking 4 years ago with past medical history significant for oxygen dependent COPD with relatively recent TURP on 6/6/18 who presents with recent productive cough and dyspnea.  He feels he has been sick for quite some time at home, coughing up green sputum and came in tonight due to worsening symptoms.    /56  Pulse 105  Temp 96.8  F (36  C) (Oral)  Resp 24  Ht 1.803 m (5' 11\")  Wt 75.8 kg (167 lb)  SpO2 96%  BMI 23.29 kg/m2  On exam after arrival to the medical floor he is sitting up in bed and was resting comfortably upon my arrival.  Heart is regular rate and rhythm  Lungs actually have good air movement with faint end expiratory wheezes and only mild bilateral crackles  Lower extremities are notable for 1+ pitting edema bilaterally with chronic venous changes noted on his left leg but not his right.    Lab workup was remarkable for BUN of 38, creatinine 1.28, albumin low at 2.5 and elevated BNP at 9383.  Troponin was also elevated at 0.256 upon presentation.    EKG showed sinus tachycardia    Chest x-ray showed bilateral patchy pulmonary infiltrates.    Assessment/plan: Acute hypoxemic respiratory failure.  He notes he feels much better than when he arrived in the emergency room.  Clinically he likely does have a COPD exacerbation due to either bronchitis or atypical pneumonia though his elevated BNP raises the question of possible flash pulmonary edema or even a cardiomyopathy leading to pulmonary edema.  --Agree with ongoing treatment directed primarily at COPD with steroids, nebs, antibiotics etc.  --Have ordered a transthoracic echocardiogram to evaluate further for cardiomyopathy.  If he slow to improve I would certainly consider trial of IV Lasix given his elevated BNP and lower extremity edema.    Raul Sanford MD  07/30/2018      "

## 2018-07-31 NOTE — PROGRESS NOTES
[facility]  Hospitalist Progress Note  Carla Gutiérrez MD 07/31/2018    Reason for Stay (Diagnosis): SOB         Assessment and Plan:      Summary of Stay: Mahad Ward is a 72 year old male with a history of O[facility] dependent COPD at [facility], quit smoking 4 years ago, BPH s/p TURP 6/6/18, admitted on 7/30/2018 with productive cough and SOB and on presentation had e/o acute on chronic respiratory failure with sats in the upper 70 % range which promptly responded to supplemental O2.  Admission evaluation notable for extensive bilateral pulmonary infiltrates, elevated trop at 0.256, and BNP at 9K.  Although clinically it all seemed most consistent with with COPD with exacerbation 2/2 LRI presumed bacterial. There was no e/o sepsis.      He has been placed on steroids/nebs and empiric abx.  Echo today to ensure no cardiac issues at play.    Overnight into 7/31 he had a brief episode of AF with RVR that spontaneously resolved    Problem List:   1. Acute on chronic hypoxic respiratory failure with mild CO2 retention: 2/2 acute copd with exacerbation.  Back to baseline O2 requirements and symptomatically improved  2. Acute on chronic COPD with exacerbation-felt infectious-bacterial vs viral difficult to tell.  procal not particularly impressive at 0.6.  Would complete course of short course of abx (5 days of azith, 5 days ceftriaxone or equivalent).  Will check resp panel, sputum culture and urinary agn.  Would do steroid burst 5-7 days, duonebs  3. Elevated troponin:  EKG without acute ST-T findings.  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  Does have chronic asx le edema so will check LLE dopplers to ensure no clot.  Given age/tob hx should get OP stress testing at some point. Would wait til current episode resolved  4. G1 dd by echo-I think he would benefit from trial of diuresis.  Will give furosemide 20 mg IV x1  5. Chronic O2 dependent COPD at 3  "L/nc  6. Chronic le edema:  Check LLE dopplers to eval for occult clot  DVT Prophylaxis: Enoxaparin (Lovenox) SQ  Code Status: Full Code  Functional:  Lives with wife in a house, independent.   Grandson is also staying    Disposition Plan   Expected discharge in 2 days to prior living arrangement once respiratory status stable.     Entered: Carla Gutiérrez 07/31/2018, 8:24 AM               Interval History (Subjective):      Chronic asx edema L > R, thinks breathing is better but not back to baseline.  Denies any CP pleuritic or otherwise. Appetite improving.  No n/v/d will try to ambulate later today                   Physical Exam:      Last Vital Signs:  /64 (BP Location: Right arm)  Pulse 108  Temp 96  F (35.6  C) (Oral)  Resp 20  Ht 1.803 m (5' 11\")  Wt 75.8 kg (167 lb)  SpO2 90%  BMI 23.29 kg/m2    I/O:up 1 L  Weight- unclear  Tele NSR    Pleasant nad looks stated age thin head nc/at sclera clear lungs diffusely diminished minimal air movement no crackles no wheeze rrr-also distant no mrg 1 + le edema on the right, 2 + on the left           Medications:      All current medications were reviewed with changes reflected in problem list.         Data:      All new lab and imaging data was reviewed.   Labs:    Recent Labs  Lab 07/31/18  0613      POTASSIUM 4.8   CHLORIDE 109   CO2 27   ANIONGAP 6   *   BUN 30   CR 0.83   GFRESTIMATED >90   GFRESTBLACK >90   KATELYNN 8.4*       Recent Labs  Lab 07/31/18  0613   WBC 13.7*   HGB 11.9*   HCT 38.7*   MCV 97         Imaging:    Echo:  7/31/18   The visual ejection fraction is estimated at 50-55%.  Grade I or early diastolic dysfunction.  Abnormal \"septal bounce\" due to right heart disease/pulm HTN  The right ventricle is mild to moderately dilated.  Moderately decreased right ventricular systolic function  The right atrium is mildly dilated.  There is no color Doppler evidence of an atrial shunt.  Right ventricular systolic pressure could not " be approximated due to  inadequate tricuspid regurgitation.  Dilated IVC (>2.5cm) with no respiratory collapse; right atrial pressure is  estimated at >20mmHg.  Borderline aortic root dilatation.  Study most likely c/w acute/chronic cor pulmonale

## 2018-07-31 NOTE — CONSULTS
"Care Transition Initial Assessment - RN    Reason For Consult: care coordination/care conference, discharge planning   Met with: Patient.  DATA   Active Problems:    COPD exacerbation (H)    Pneumonia       Cognitive Status: awake, alert and oriented.  [facility]  Primary Care MD Name: González Poppybenoit  Contact information and PCP information verified: Yes  Lives With: spouse                    Insurance concerns: No Insurance issues identified  ASSESSMENT  Patient currently receives the following services:  Home O2- [facility]        Identified issues/concerns regarding health management: Patient admitted with COPD exacerbation. Patient has home O2. Patient receiving nebs and steroids in hospital. Patient does not have a neb machine at home. Given and reviewed COPD action sheet with patient. Patient states that activity is a trigger and states \"I know I should have been seen sooner as I was feeling tired all the time, but I thought it would just go away.\" Reinforced to monitor for COPD symptoms daily and utilize action plan for seeking medical care. Patient states will make own f/u appointment.     PLAN  Financial costs for the patient: Not discussed.  Patient given options and choices for discharge Yes.  Patient/family is agreeable to the plan?  Yes.  Patient anticipates discharging to Home.        Patient anticipates needs for home equipment: Unkown, maybe neb machine  Plan/Disposition: Home   Appointments: Patient will make f/u PCP appointment.       Care  (CTS) will continue to follow as needed.    Sunshine LAM CTS          "

## 2018-08-01 LAB
ANION GAP SERPL CALCULATED.3IONS-SCNC: 4 MMOL/L (ref 3–14)
BASOPHILS # BLD AUTO: 0 10E9/L (ref 0–0.2)
BASOPHILS NFR BLD AUTO: 0.1 %
BUN SERPL-MCNC: 34 MG/DL (ref 7–30)
CALCIUM SERPL-MCNC: 8.6 MG/DL (ref 8.5–10.1)
CHLORIDE SERPL-SCNC: 106 MMOL/L (ref 94–109)
CO2 SERPL-SCNC: 31 MMOL/L (ref 20–32)
CREAT SERPL-MCNC: 0.82 MG/DL (ref 0.66–1.25)
DIFFERENTIAL METHOD BLD: ABNORMAL
EOSINOPHIL # BLD AUTO: 0 10E9/L (ref 0–0.7)
EOSINOPHIL NFR BLD AUTO: 0 %
ERYTHROCYTE [DISTWIDTH] IN BLOOD BY AUTOMATED COUNT: 14 % (ref 10–15)
FLUAV H1 2009 PAND RNA SPEC QL NAA+PROBE: NEGATIVE
FLUAV H1 RNA SPEC QL NAA+PROBE: NEGATIVE
FLUAV H3 RNA SPEC QL NAA+PROBE: NEGATIVE
FLUAV RNA SPEC QL NAA+PROBE: NEGATIVE
FLUBV RNA SPEC QL NAA+PROBE: NEGATIVE
GFR SERPL CREATININE-BSD FRML MDRD: >90 ML/MIN/1.7M2
GLUCOSE SERPL-MCNC: 105 MG/DL (ref 70–99)
HADV DNA SPEC QL NAA+PROBE: NEGATIVE
HADV DNA SPEC QL NAA+PROBE: NEGATIVE
HCT VFR BLD AUTO: 37.1 % (ref 40–53)
HGB BLD-MCNC: 11.4 G/DL (ref 13.3–17.7)
HMPV RNA SPEC QL NAA+PROBE: NEGATIVE
HPIV1 RNA SPEC QL NAA+PROBE: NEGATIVE
HPIV2 RNA SPEC QL NAA+PROBE: NEGATIVE
HPIV3 RNA SPEC QL NAA+PROBE: NEGATIVE
IMM GRANULOCYTES # BLD: 0.2 10E9/L (ref 0–0.4)
IMM GRANULOCYTES NFR BLD: 1.2 %
LMWH PPP CHRO-ACNC: 0.11 IU/ML
LMWH PPP CHRO-ACNC: 0.31 IU/ML
LMWH PPP CHRO-ACNC: 0.37 IU/ML
LYMPHOCYTES # BLD AUTO: 1.4 10E9/L (ref 0.8–5.3)
LYMPHOCYTES NFR BLD AUTO: 8.8 %
MCH RBC QN AUTO: 29.8 PG (ref 26.5–33)
MCHC RBC AUTO-ENTMCNC: 30.7 G/DL (ref 31.5–36.5)
MCV RBC AUTO: 97 FL (ref 78–100)
MICROBIOLOGIST REVIEW: NORMAL
MONOCYTES # BLD AUTO: 0.7 10E9/L (ref 0–1.3)
MONOCYTES NFR BLD AUTO: 4.1 %
NEUTROPHILS # BLD AUTO: 13.9 10E9/L (ref 1.6–8.3)
NEUTROPHILS NFR BLD AUTO: 85.8 %
NRBC # BLD AUTO: 0 10*3/UL
NRBC BLD AUTO-RTO: 0 /100
PLATELET # BLD AUTO: 398 10E9/L (ref 150–450)
PLATELET # BLD EST: ABNORMAL 10*3/UL
POTASSIUM SERPL-SCNC: 4.7 MMOL/L (ref 3.4–5.3)
RBC # BLD AUTO: 3.82 10E12/L (ref 4.4–5.9)
RBC MORPH BLD: ABNORMAL
RHINOVIRUS RNA SPEC QL NAA+PROBE: NEGATIVE
RSV RNA SPEC QL NAA+PROBE: NEGATIVE
RSV RNA SPEC QL NAA+PROBE: NEGATIVE
SODIUM SERPL-SCNC: 141 MMOL/L (ref 133–144)
SPECIMEN SOURCE: NORMAL
WBC # BLD AUTO: 16.2 10E9/L (ref 4–11)

## 2018-08-01 PROCEDURE — 25000128 H RX IP 250 OP 636: Performed by: PHYSICIAN ASSISTANT

## 2018-08-01 PROCEDURE — 85520 HEPARIN ASSAY: CPT | Performed by: INTERNAL MEDICINE

## 2018-08-01 PROCEDURE — 40000275 ZZH STATISTIC RCP TIME EA 10 MIN

## 2018-08-01 PROCEDURE — A9270 NON-COVERED ITEM OR SERVICE: HCPCS | Mod: GY | Performed by: PHYSICIAN ASSISTANT

## 2018-08-01 PROCEDURE — 25000128 H RX IP 250 OP 636: Performed by: INTERNAL MEDICINE

## 2018-08-01 PROCEDURE — 85025 COMPLETE CBC W/AUTO DIFF WBC: CPT | Performed by: INTERNAL MEDICINE

## 2018-08-01 PROCEDURE — 80048 BASIC METABOLIC PNL TOTAL CA: CPT | Performed by: INTERNAL MEDICINE

## 2018-08-01 PROCEDURE — 36415 COLL VENOUS BLD VENIPUNCTURE: CPT | Performed by: INTERNAL MEDICINE

## 2018-08-01 PROCEDURE — 12000007 ZZH R&B INTERMEDIATE

## 2018-08-01 PROCEDURE — 99207 ZZC CDG-MDM COMPONENT: MEETS LOW - DOWN CODED: CPT | Performed by: INTERNAL MEDICINE

## 2018-08-01 PROCEDURE — 25000125 ZZHC RX 250: Performed by: PHYSICIAN ASSISTANT

## 2018-08-01 PROCEDURE — 94640 AIRWAY INHALATION TREATMENT: CPT | Mod: 76

## 2018-08-01 PROCEDURE — 25000125 ZZHC RX 250: Performed by: INTERNAL MEDICINE

## 2018-08-01 PROCEDURE — A9270 NON-COVERED ITEM OR SERVICE: HCPCS | Mod: GY | Performed by: INTERNAL MEDICINE

## 2018-08-01 PROCEDURE — 94640 AIRWAY INHALATION TREATMENT: CPT

## 2018-08-01 PROCEDURE — 25000132 ZZH RX MED GY IP 250 OP 250 PS 637: Mod: GY | Performed by: PHYSICIAN ASSISTANT

## 2018-08-01 PROCEDURE — 85049 AUTOMATED PLATELET COUNT: CPT | Performed by: INTERNAL MEDICINE

## 2018-08-01 PROCEDURE — 99232 SBSQ HOSP IP/OBS MODERATE 35: CPT | Performed by: INTERNAL MEDICINE

## 2018-08-01 PROCEDURE — 25000132 ZZH RX MED GY IP 250 OP 250 PS 637: Mod: GY | Performed by: INTERNAL MEDICINE

## 2018-08-01 RX ORDER — IPRATROPIUM BROMIDE AND ALBUTEROL SULFATE 2.5; .5 MG/3ML; MG/3ML
3 SOLUTION RESPIRATORY (INHALATION) EVERY 4 HOURS PRN
Status: DISCONTINUED | OUTPATIENT
Start: 2018-08-01 | End: 2018-08-04 | Stop reason: HOSPADM

## 2018-08-01 RX ORDER — FENTANYL CITRATE 50 UG/ML
INJECTION, SOLUTION INTRAMUSCULAR; INTRAVENOUS
Status: DISPENSED
Start: 2018-08-01 | End: 2018-08-02

## 2018-08-01 RX ADMIN — Medication 3000 UNITS: at 14:57

## 2018-08-01 RX ADMIN — AZITHROMYCIN MONOHYDRATE 250 MG: 250 TABLET ORAL at 08:17

## 2018-08-01 RX ADMIN — HEPARIN SODIUM 1650 UNITS/HR: 10000 INJECTION, SOLUTION INTRAVENOUS at 14:57

## 2018-08-01 RX ADMIN — ACETAMINOPHEN 650 MG: 325 TABLET, FILM COATED ORAL at 16:02

## 2018-08-01 RX ADMIN — METHYLPREDNISOLONE SODIUM SUCCINATE 62.5 MG: 125 INJECTION, POWDER, FOR SOLUTION INTRAMUSCULAR; INTRAVENOUS at 09:23

## 2018-08-01 RX ADMIN — HEPARIN SODIUM 1350 UNITS/HR: 10000 INJECTION, SOLUTION INTRAVENOUS at 12:08

## 2018-08-01 RX ADMIN — GUAIFENESIN 600 MG: 600 TABLET, EXTENDED RELEASE ORAL at 08:17

## 2018-08-01 RX ADMIN — IPRATROPIUM BROMIDE AND ALBUTEROL SULFATE 3 ML: .5; 3 SOLUTION RESPIRATORY (INHALATION) at 19:22

## 2018-08-01 RX ADMIN — IPRATROPIUM BROMIDE AND ALBUTEROL SULFATE 3 ML: .5; 3 SOLUTION RESPIRATORY (INHALATION) at 13:19

## 2018-08-01 RX ADMIN — CEFTRIAXONE SODIUM 1 G: 1 INJECTION, POWDER, FOR SOLUTION INTRAMUSCULAR; INTRAVENOUS at 15:46

## 2018-08-01 RX ADMIN — GUAIFENESIN 600 MG: 600 TABLET, EXTENDED RELEASE ORAL at 19:50

## 2018-08-01 RX ADMIN — IPRATROPIUM BROMIDE AND ALBUTEROL SULFATE 3 ML: .5; 3 SOLUTION RESPIRATORY (INHALATION) at 03:18

## 2018-08-01 RX ADMIN — ACETAMINOPHEN 650 MG: 325 TABLET, FILM COATED ORAL at 03:39

## 2018-08-01 RX ADMIN — ACETAMINOPHEN 650 MG: 325 TABLET, FILM COATED ORAL at 08:17

## 2018-08-01 ASSESSMENT — ACTIVITIES OF DAILY LIVING (ADL)
ADLS_ACUITY_SCORE: 11

## 2018-08-01 NOTE — PROGRESS NOTES
[facility]  Hospitalist Progress Note  Carla Gutiérrez MD 08/01/2018    Reason for Stay (Diagnosis): SOB         Assessment and Plan:      Summary of Stay: Mahad Ward is a 72 year old male with a history of O2 dependent COPD at [facility], quit smoking 4 years ago, BPH s/p TURP 6/6/18, admitted on 7/30/2018 with productive cough and SOB and on presentation had e/o acute on chronic respiratory failure with sats in the upper 70 % range which promptly responded to supplemental O2.  Admission evaluation notable for extensive bilateral pulmonary infiltrates, elevated trop at 0.256, and BNP at 9K.  Although clinically it all seemed most consistent with with COPD with exacerbation 2/2 LRI presumed bacterial. There was no e/o sepsis.      He has been placed on steroids/nebs and empiric abx.  Echo on 7/31 significant for pulmonary hypertension and some e/o right heart strain, he was also noted to have asx le edema (which he reports as chronic) and hx of dvt in setting of trauma.  LLE doppler confirmed DVT and so initiated on heparin gtt.  CT PE negative for pulmonary emboli but + for some pleural based nodules    Overnight into 7/31 he had a brief episode of AF with RVR that spontaneously resolved    Problem List:   1. Acute on chronic hypoxic respiratory failure with mild CO2 retention: 2/2 acute copd with exacerbation.   2. Acute on chronic COPD with exacerbation-felt infectious-bacterial vs viral difficult to tell.  procal not particularly impressive at 0.6.  Would complete course of short course of abx (5 days of azith, 5 days ceftriaxone or equivalent).  Resp panel negative, strep urinary agn neg, sputum culture pending. Would do steroid burst 5-7 days, duonebs.  More wheezy today as is refusing nebs  3. LLE DVT:  Did have TURP last month and has been more immobile of late-initiated on heparin, await pharm liason input as did poorly with warfarin in the past per his report. Presumably prostate cancer screening  "up to date in light of recent TURP-but should dbl check with primary MD, thinks he had a colonoscopy recently but unsure of the details-should also f/u with his primary for this.  Has some new pulmonary nodules which will need close f/u as well-see below.  Could be provoked but needs these other issues looked at.   4. Pulmonary nodules: high risk-could be inflammatory/infectious vs neoplastic-I'd recommend repeat CT scan in 4 weeks to ensure resolution and if not then needs bx or PET.   5. Elevated troponin 2/2 demand ischemia:  EKG without acute ST-T findings.  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   Given age/tob hx should get OP stress testing at some point. Would wait til current episode resolved  6. G1 dd by echo-I think he would benefit from trial of diuresis.  Given furosemide 20 mg IV x1 and I note no improvement in respiratory status  7. Chronic O2 dependent COPD at 3 L/nc    DVT Prophylaxis: Enoxaparin (Lovenox) SQ  Code Status: Full Code  Functional:  Lives with wife in a house, independent.   Grandson is also staying    Disposition Plan   Expected discharge in 2 days to prior living arrangement once respiratory status stable.     Entered: Carla Gutiérrez 08/01/2018, 6:04 PM               Interval History (Subjective):      Thinks he's doing ok although clearly looks more sob to me today. Denies any pain.  No n/v/d.  No cp.                    Physical Exam:      Last Vital Signs:  /70 (BP Location: Right arm)  Pulse 87  Temp 96.3  F (35.7  C) (Oral)  Resp 20  Ht 1.803 m (5' 11\")  Wt 76.3 kg (168 lb 3.2 oz)  SpO2 96%  BMI 23.46 kg/m2    I/O:up 1.6 L  Weight- unclear  Tele NSR    Pleasant nad looks stated age thin head nc/at sclera clear lungs diffusely diminished air movement but also with tight sounding wheezes in b lung fields  rrr-also distant no mrg 1 + le edema on the right, 2 + on the left           Medications:      All " "current medications were reviewed with changes reflected in problem list.         Data:      All new lab and imaging data was reviewed.   Labs:    Recent Labs  Lab 08/01/18  0418      POTASSIUM 4.7   CHLORIDE 106   CO2 31   ANIONGAP 4   *   BUN 34*   CR 0.82   GFRESTIMATED >90   GFRESTBLACK >90   KATELYNN 8.6       Recent Labs  Lab 08/01/18  0418   WBC 16.2*   HGB 11.4*   HCT 37.1*   MCV 97         Imaging:    Echo:  7/31/18   The visual ejection fraction is estimated at 50-55%.  Grade I or early diastolic dysfunction.  Abnormal \"septal bounce\" due to right heart disease/pulm HTN  The right ventricle is mild to moderately dilated.  Moderately decreased right ventricular systolic function  The right atrium is mildly dilated.  There is no color Doppler evidence of an atrial shunt.  Right ventricular systolic pressure could not be approximated due to  inadequate tricuspid regurgitation.  Dilated IVC (>2.5cm) with no respiratory collapse; right atrial pressure is  estimated at >20mmHg.  Borderline aortic root dilatation.  Study most likely c/w acute/chronic cor pulmonale    "

## 2018-08-01 NOTE — PLAN OF CARE
End of Shift Summary.  For vital signs and complete assessments, please see documentation flowsheets.     Pertinent assessments: patient c/o mild headache, patient reports from coughing so much, Tylenol given with success  Major Shift Events: patient found to be positive for a DVT and heparin started  Plan (Upcoming Events): cont. Plan of care  Discharge/Transfer Needs:     Bedside Shift Report Completed : yes  Bedside Safety Check Completed:yes

## 2018-08-01 NOTE — PLAN OF CARE
Problem: Patient Care Overview  Goal: Plan of Care/Patient Progress Review  Outcome: Improving  A/Ox4, independent in room, VSS, O2 4L NC,  Hep gtt 13.5 next hep 10a 1400, L PIV removed by pt, started new IV in R lower forearm, LS crackles/wheezes to both lower lobes, Tele SR with peaked T waves, c/o headache r/t coughing - PRN tylenol given with relief, frequent productive cough, plan to discharge in 2 days, continue plan of care.

## 2018-08-01 NOTE — PROGRESS NOTES
X-Cover    Asked to follow up on the results of left lower extremity doppler. Final report is pending but preliminary report shows partially occlusive thrombus in femoral vein from mid-thigh to popliteal vein.  Will start heparin drip with pharmacy to dose per protocol. Given the ECHO findings, this also raises question of a PE. Will obtain CT chest pulmonary angiogram to evaluate for that.     Discussed findings with patient, he tells me that he had a DVT in the past and has been on warfarin previously. Otherwise, he feels that the breathing is much better since admission, and is hemodynamically stable. Advised him to notify the nurse right away if he develops any chest pain, worsening dyspnea etc.

## 2018-08-01 NOTE — PHARMACY
Anticoagulant coverage check.  Patient has Medicare Part D through [employer] with fulfilled $175 deductible.    Xarelto/Eliquis  Upon receipt of RX, [facility] can provide 1 month free.  The copay will be $47/mo thereafter.  I am not clear if patient will reach the coverage gap (he is on two expensive inhalers so he might).  If he does, copay would increase to $143/mo until the new coverage year begins 1/1/19.    Enoxaparin 80mg x 10 syringes  $219    Jantoven  $6/mo    -HEVER Perez, Pharmacy Technician/Liaison, Discharge Pharmacy [phone]

## 2018-08-01 NOTE — PLAN OF CARE
Problem: Patient Care Overview  Goal: Plan of Care/Patient Progress Review  Outcome: No Change  Pt VSS, HA. Tylenol. IVP soluMed. IV heparin 1650ml/hr. Heparin bolus 3000. LS nancy wheezes/crackles. Pt refusing scheduled nebs. o2 sats at 93% on 4L via NC. VERA. Per MD Bladder scanned, showed retention of 88 mls. Ind. Urinal at bedside. Tele shows SR w/elevated Twaves. Will continue with current POC. Possible discharge 2 to 3 days.

## 2018-08-02 LAB
ANION GAP SERPL CALCULATED.3IONS-SCNC: 3 MMOL/L (ref 3–14)
BACTERIA SPEC CULT: NORMAL
BASOPHILS # BLD AUTO: 0 10E9/L (ref 0–0.2)
BASOPHILS NFR BLD AUTO: 0 %
BUN SERPL-MCNC: 22 MG/DL (ref 7–30)
CALCIUM SERPL-MCNC: 8.7 MG/DL (ref 8.5–10.1)
CHLORIDE SERPL-SCNC: 103 MMOL/L (ref 94–109)
CO2 SERPL-SCNC: 33 MMOL/L (ref 20–32)
CREAT SERPL-MCNC: 0.69 MG/DL (ref 0.66–1.25)
DIFFERENTIAL METHOD BLD: ABNORMAL
EOSINOPHIL # BLD AUTO: 0 10E9/L (ref 0–0.7)
EOSINOPHIL NFR BLD AUTO: 0 %
ERYTHROCYTE [DISTWIDTH] IN BLOOD BY AUTOMATED COUNT: 13.9 % (ref 10–15)
GFR SERPL CREATININE-BSD FRML MDRD: >90 ML/MIN/1.7M2
GLUCOSE SERPL-MCNC: 80 MG/DL (ref 70–99)
HCT VFR BLD AUTO: 39.3 % (ref 40–53)
HGB BLD-MCNC: 12.2 G/DL (ref 13.3–17.7)
INR PPP: 1.12 (ref 0.86–1.14)
LMWH PPP CHRO-ACNC: 0.32 IU/ML
LYMPHOCYTES # BLD AUTO: 2.1 10E9/L (ref 0.8–5.3)
LYMPHOCYTES NFR BLD AUTO: 17 %
MCH RBC QN AUTO: 29.8 PG (ref 26.5–33)
MCHC RBC AUTO-ENTMCNC: 31 G/DL (ref 31.5–36.5)
MCV RBC AUTO: 96 FL (ref 78–100)
MONOCYTES # BLD AUTO: 0.4 10E9/L (ref 0–1.3)
MONOCYTES NFR BLD AUTO: 3 %
MYELOCYTES # BLD: 0.1 10E9/L
MYELOCYTES NFR BLD MANUAL: 1 %
NEUTROPHILS # BLD AUTO: 9.6 10E9/L (ref 1.6–8.3)
NEUTROPHILS NFR BLD AUTO: 79 %
PLATELET # BLD AUTO: 409 10E9/L (ref 150–450)
PLATELET # BLD EST: ABNORMAL 10*3/UL
POTASSIUM SERPL-SCNC: 4.5 MMOL/L (ref 3.4–5.3)
RBC # BLD AUTO: 4.09 10E12/L (ref 4.4–5.9)
RBC MORPH BLD: ABNORMAL
SODIUM SERPL-SCNC: 139 MMOL/L (ref 133–144)
SPECIMEN SOURCE: NORMAL
WBC # BLD AUTO: 12.1 10E9/L (ref 4–11)

## 2018-08-02 PROCEDURE — 25000128 H RX IP 250 OP 636: Performed by: PHYSICIAN ASSISTANT

## 2018-08-02 PROCEDURE — A9270 NON-COVERED ITEM OR SERVICE: HCPCS | Mod: GY | Performed by: PHYSICIAN ASSISTANT

## 2018-08-02 PROCEDURE — 85025 COMPLETE CBC W/AUTO DIFF WBC: CPT | Performed by: INTERNAL MEDICINE

## 2018-08-02 PROCEDURE — 85610 PROTHROMBIN TIME: CPT | Performed by: INTERNAL MEDICINE

## 2018-08-02 PROCEDURE — 25000132 ZZH RX MED GY IP 250 OP 250 PS 637: Mod: GY | Performed by: PHYSICIAN ASSISTANT

## 2018-08-02 PROCEDURE — 36415 COLL VENOUS BLD VENIPUNCTURE: CPT | Performed by: INTERNAL MEDICINE

## 2018-08-02 PROCEDURE — 12000007 ZZH R&B INTERMEDIATE

## 2018-08-02 PROCEDURE — 25000132 ZZH RX MED GY IP 250 OP 250 PS 637: Mod: GY | Performed by: INTERNAL MEDICINE

## 2018-08-02 PROCEDURE — A9270 NON-COVERED ITEM OR SERVICE: HCPCS | Mod: GY | Performed by: INTERNAL MEDICINE

## 2018-08-02 PROCEDURE — 99233 SBSQ HOSP IP/OBS HIGH 50: CPT | Performed by: INTERNAL MEDICINE

## 2018-08-02 PROCEDURE — 80048 BASIC METABOLIC PNL TOTAL CA: CPT | Performed by: INTERNAL MEDICINE

## 2018-08-02 PROCEDURE — 25000128 H RX IP 250 OP 636: Performed by: INTERNAL MEDICINE

## 2018-08-02 PROCEDURE — 85520 HEPARIN ASSAY: CPT | Performed by: INTERNAL MEDICINE

## 2018-08-02 RX ORDER — BENZONATATE 100 MG/1
100 CAPSULE ORAL 3 TIMES DAILY PRN
Status: DISCONTINUED | OUTPATIENT
Start: 2018-08-02 | End: 2018-08-04 | Stop reason: HOSPADM

## 2018-08-02 RX ORDER — WARFARIN SODIUM 7.5 MG/1
7.5 TABLET ORAL
Status: COMPLETED | OUTPATIENT
Start: 2018-08-02 | End: 2018-08-02

## 2018-08-02 RX ADMIN — AZITHROMYCIN MONOHYDRATE 250 MG: 250 TABLET ORAL at 09:26

## 2018-08-02 RX ADMIN — METHYLPREDNISOLONE SODIUM SUCCINATE 62.5 MG: 125 INJECTION, POWDER, FOR SOLUTION INTRAMUSCULAR; INTRAVENOUS at 09:25

## 2018-08-02 RX ADMIN — HEPARIN SODIUM 1650 UNITS/HR: 10000 INJECTION, SOLUTION INTRAVENOUS at 20:04

## 2018-08-02 RX ADMIN — WARFARIN SODIUM 7.5 MG: 7.5 TABLET ORAL at 17:37

## 2018-08-02 RX ADMIN — GUAIFENESIN 600 MG: 600 TABLET, EXTENDED RELEASE ORAL at 09:26

## 2018-08-02 RX ADMIN — HEPARIN SODIUM 1650 UNITS/HR: 10000 INJECTION, SOLUTION INTRAVENOUS at 02:53

## 2018-08-02 RX ADMIN — CEFTRIAXONE SODIUM 1 G: 1 INJECTION, POWDER, FOR SOLUTION INTRAMUSCULAR; INTRAVENOUS at 15:49

## 2018-08-02 RX ADMIN — BENZONATATE 100 MG: 100 CAPSULE ORAL at 16:04

## 2018-08-02 RX ADMIN — ACETAMINOPHEN 650 MG: 325 TABLET, FILM COATED ORAL at 00:33

## 2018-08-02 RX ADMIN — ACETAMINOPHEN 650 MG: 325 TABLET, FILM COATED ORAL at 09:26

## 2018-08-02 RX ADMIN — GUAIFENESIN 600 MG: 600 TABLET, EXTENDED RELEASE ORAL at 20:09

## 2018-08-02 ASSESSMENT — ACTIVITIES OF DAILY LIVING (ADL)
ADLS_ACUITY_SCORE: 11

## 2018-08-02 NOTE — PLAN OF CARE
Problem: Patient Care Overview  Goal: Plan of Care/Patient Progress Review  Outcome: No Change  RN - VSS - Tele SR with elevated T waves. Pt c/o mild headache - otherwise denying pain. Remains on continuous oxygen at 4L via nasal cannula - O2 saturation 94%. Pt unwilling to further wean at this time. Crackles and wheezes heard throughout. Pt continues to demonstrate dyspnea with exertion and intermittent c/o SOB. Frequent Productive cough continues. Hep gtt continues to infuse at 1650 units per hour. Pt tolerating reg diet. Voiding adequately. Up ad sanju. Hopeful for symptom improvement and weaning of oxygen needs.

## 2018-08-02 NOTE — PROGRESS NOTES
[facility]  Hospitalist Progress Note  Stephane Perez MD, MD 08/02/2018  (Text Page)  Reason for Stay (Diagnosis): Multifactorial shortness of breath         Assessment and Plan:        Summary of Stay: Mahad Ward is a 72 year old male with a history of O2 dependent COPD at [facility], quit smoking 4 years ago, BPH s/p TURP 6/6/18, admitted on 7/30/2018 with productive cough and SOB and on presentation had e/o acute on chronic respiratory failure with sats in the upper 70 % range which promptly responded to supplemental O2.  Admission evaluation notable for extensive bilateral pulmonary infiltrates, elevated trop at 0.256, and BNP at 9K.  Although clinically it all seemed most consistent with with COPD with exacerbation 2/2 LRI presumed bacterial. There was no e/o sepsis.       He has been placed on steroids/nebs and empiric abx.  Echo on 7/31 significant for pulmonary hypertension and some e/o right heart strain, he was also noted to have asx le edema (which he reports as chronic) and hx of dvt in setting of trauma.  LLE doppler confirmed DVT and so initiated on heparin gtt.  CT PE negative for pulmonary emboli but + for some pleural based nodules     Overnight into 7/31 he had a brief episode of AF with RVR that spontaneously resolved     Problem List:   1. Acute on chronic hypoxic respiratory failure with mild CO2 retention: 2/2 acute copd with exacerbation.   2. Acute on chronic COPD with exacerbation-felt infectious-bacterial vs viral difficult to tell.  procal not particularly impressive at 0.6.  Would complete course of short course of abx (5 days of azith, 5 days ceftriaxone or equivalent).  Resp panel negative, strep urinary agn neg, sputum culture pending.  And for steroid burst 5-7 days, duonebs.    3. LLE DVT:  Did have TURP last month and has been more immobile of late-initiated on heparin, await pharm liason input as did poorly with warfarin in the past per his report. Presumably  prostate cancer screening up to date in light of recent TURP-but should follow-up with primary MD, thinks he had a colonoscopy recently but unsure of the details-should also f/u with his primary for this.  Has some new pulmonary nodules which will need close f/u as well-see below.  Could be provoked but needs these other issues looked at.   -Start warfarin and assume it is a provoke DVT with recent surgery and hospitalization will need to be an anticoagulation for his DVT at least in the next 3 months however if he has recurrence of atrial fibrillation that might need a lifelong coverage with anticoagulation.  -Patient stated that he had no prior bleeding tendencies, he was on warfarin for short period of time several decades ago after he apparently had a blood clot on his lower extremity as he sustained a work-related injury at that time.  4. Pulmonary nodules: high risk-could be inflammatory/infectious vs neoplastic-I'd recommend repeat CT scan in 4 weeks to ensure resolution and if not then needs bx or PET.   5. Elevated troponin:  EKG without acute ST-T findings.  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    Earlier determined that likely not ACS related and will need further risk stratification as outpatient.  Given age/tob hx should get OP stress testing at some point. Would wait til current episode resolved  6. Grade 1 diastolic dysfunction by echo-I think he would benefit from trial of diuresis.  Given furosemide 20 mg IV x1 and  noted no improvement in respiratory status  7. Chronic O2 dependent COPD at 3 L/nc     DVT Prophylaxis: Enoxaparin (Lovenox) SQ  Code Status: Full Code  Functional:  Lives with wife in a house, independent.   Grandson is also staying        Disposition Plan     Expected discharge in 2 days to prior living arrangement once respiratory status stable.        Interval History (Subjective):      I have assumed care today.   "Seen and examined.  Chart reviewed.  Case discussed with nursing service.  Bossman is found sitting on the hospital bed and not in any form of distress.  He denies any chest pain, shortness of breath, nausea, vomiting he thinks he is feeling a little better with earlier shortness of breath.  He is tolerating oral diet with no reported complaints of nausea, vomiting or diarrhea.  Remain afebrile.  On oxygen supplementation.              Physical Exam:      Last Vital Signs:  /70 (BP Location: Right arm)  Pulse 67  Temp 97.4  F (36.3  C) (Oral)  Resp 18  Ht 1.803 m (5' 11\")  Wt 75.3 kg (166 lb)  SpO2 95%  BMI 23.15 kg/m2    I/O last 3 completed shifts:  In: 1258.1 [P.O.:1180; I.V.:78.1]  Out: 1525 [Urine:1525]  Wt Readings from Last 1 Encounters:   08/02/18 75.3 kg (166 lb)     Vitals:    07/30/18 1449 07/30/18 1850 08/01/18 1135 08/02/18 0652   Weight: 81.6 kg (180 lb) 75.8 kg (167 lb) 76.3 kg (168 lb 3.2 oz) 75.3 kg (166 lb)       Constitutional: Awake, alert, cooperative, no apparent distress   Respiratory:  Fair air entry, minimal scant wheezing, bilateral fine inspiratory crackles   Cardiovascular: Regular rate and rhythm, normal S1 and S2, and no murmur noted   Abdomen: Normal bowel sounds, soft, non-distended, non-tender   Skin: No rashes, no cyanosis, dry to touch   Neuro: Alert and oriented x3, no weakness, spontaneous and coherent speech   Extremities:  +1 pitting edema on both lower extremities, normal range of motion   Other(s): Euthymic mood, not agitated       All other systems: Negative          Medications:      All current medications were reviewed with changes reflected in problem list.         Data:      All new lab and imaging data was reviewed.   Labs:    Recent Labs  Lab 07/31/18  0930 07/30/18  1539 07/30/18  1505   CULT Culture in progress No growth after 3 days No growth after 3 days       Recent Labs  Lab 08/02/18  0630 08/01/18  0418 07/31/18  0613    141 142   POTASSIUM 4.5 " 4.7 4.8   CHLORIDE 103 106 109   CO2 33* 31 27   ANIONGAP 3 4 6   GLC 80 105* 134*   BUN 22 34* 30   CR 0.69 0.82 0.83   GFRESTIMATED >90 >90 >90   GFRESTBLACK >90 >90 >90   KATELYNN 8.7 8.6 8.4*       Recent Labs  Lab 08/02/18  0630 08/01/18  0418 07/31/18  0613   WBC 12.1* 16.2* 13.7*   HGB 12.2* 11.4* 11.9*   HCT 39.3* 37.1* 38.7*   MCV 96 97 97    398 377       Recent Labs  Lab 08/02/18  0630 08/01/18  0418 07/31/18  0613 07/30/18  1505   GLC 80 105* 134* 126*     No results for input(s): INR in the last 168 hours.    Recent Labs  Lab 07/30/18  2254 07/30/18  1922 07/30/18  1505   TROPI 0.189* 0.213* 0.256*     No results for input(s): COLOR, APPEARANCE, URINEGLC, URINEBILI, URINEKETONE, SG, UBLD, URINEPH, PROTEIN, UROBILINOGEN, NITRITE, LEUKEST, RBCU, WBCU in the last 168 hours.   Imaging:   Recent Results (from the past 48 hour(s))   US Lower Extremity Venous Duplex Left    Narrative    VENOUS ULTRASOUND LEFT LEG  7/31/2018 6:27 PM     HISTORY: Asymptomatic edema, evaluate for clot in one with  acute-on-chronic hypoxic respiratory failure.     COMPARISON: None.    FINDINGS: Examination of the deep veins with graded compression and  color flow Doppler with spectral wave form analysis was performed.   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 There is partially occlusive deep venous thrombus in one  of these duplicated veins which extends into the popliteal vein.      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 This is partially occlusive.    CLARA BA MD   CT Chest Pulmonary Embolism w Contrast    Narrative    CT CHEST PULMONARY EMBOLISM WITH CONTRAST 7/31/2018 9:24 PM     HISTORY: DVT. Hypoxia, rule out PE.     CONTRAST DOSE:  64 mL [id number]    Radiation dose for this scan was reduced using automated exposure  control, adjustment of the mA and/or kV  according to patient size, or  iterative reconstruction technique.    FINDINGS:  There is a good contrast bolus within the pulmonary  arteries. There are no pulmonary arterial filling defects to indicate  pulmonary embolism. Contrast enhancement within the aorta is less  optimal. However, no aortic dissection is demonstrated. Nonenlarged  bilateral hilar lymph nodes are noted. Within the lateral segment of  the right middle lobe, there is a pleural-based 2.0 x 2.5 cm opacity  or mass. A smaller similar of pleural-based opacity or mass is also  noted laterally within the left lower lobe at the left lung base.  Miliary nodular pattern is noted throughout both lungs involving upper  and lower lobes. No pleural effusion or pneumothorax is demonstrated.  Nonspecific 1.3 cm hypodensity is present within the right lobe of the  liver. Hepatic fatty infiltration is noted.      Impression    IMPRESSION:  1. No CT evidence of pulmonary embolism.  2. Right middle lobe lateral segment pleural-based 2.5 x 2.0 cm  ill-defined opacity which could represent neoplastic or inflammatory  disease. Similar pleural-based opacity is noted laterally in the left  lower lung near the lung base.  3. Miliary nodular opacities throughout both upper and lower lungs of  indeterminate etiology. Again, this could represent neoplastic versus  inflammatory disease.    SHENA KIRKPATRICK MD

## 2018-08-02 NOTE — PLAN OF CARE
Problem: Patient Care Overview  Goal: Plan of Care/Patient Progress Review  Outcome: No Change  [id number]: Pt resting comfortably through the night. A&O. VSS on 4L O2. Pain controlled with Tylenol. Heparin gtt infusing at 16.5ml/hr. Transfers ind. Tele reads SR with tall T waves. Will continue to monitor.

## 2018-08-02 NOTE — PLAN OF CARE
Problem: Patient Care Overview  Goal: Plan of Care/Patient Progress Review  Outcome: No Change  Pt a/o x 4; VSS, pt denies dizziness, & n/v. Pt reports headache from coughing, administered Tylenol. Pt reports SOB, on 4L O2. Pt requesting cough medication, notified MD.    Heparin gtt infusing at 16.5ml/hr.  INR level 1.12. MD stated that pt will start warfarin therapy. Transfers ind. Tele reads SR with tall T waves. Possible discharge in `1-2 days. Will continue to monitor pt.

## 2018-08-02 NOTE — PHARMACY
Clinical Pharmacy - Warfarin Dosing Consult     Pharmacy has been consulted to manage this patient s warfarin therapy.  Indication: DVT/ PE Treatment  Therapy Goal: INR 2-3  Warfarin Prior to Admission: No  Significant drug interactions: Heparin gtt, Zithromax    INR   Date Value Ref Range Status   08/02/2018 1.12 0.86 - 1.14 Final     Recommend warfarin 7.5 mg today.  Pharmacy will monitor Mahad Ward daily and order warfarin doses to achieve specified goal.      Please contact pharmacy as soon as possible if the warfarin needs to be held for a procedure or if the warfarin goals change.

## 2018-08-03 ENCOUNTER — APPOINTMENT (OUTPATIENT)
Dept: OCCUPATIONAL THERAPY | Facility: CLINIC | Age: 73
DRG: 193 | End: 2018-08-03
Attending: INTERNAL MEDICINE
Payer: MEDICARE

## 2018-08-03 LAB
ERYTHROCYTE [DISTWIDTH] IN BLOOD BY AUTOMATED COUNT: 13.3 % (ref 10–15)
HCT VFR BLD AUTO: 37.7 % (ref 40–53)
HGB BLD-MCNC: 11.7 G/DL (ref 13.3–17.7)
INR PPP: 1.32 (ref 0.86–1.14)
LMWH PPP CHRO-ACNC: 0.29 IU/ML
MCH RBC QN AUTO: 29.5 PG (ref 26.5–33)
MCHC RBC AUTO-ENTMCNC: 31 G/DL (ref 31.5–36.5)
MCV RBC AUTO: 95 FL (ref 78–100)
PLATELET # BLD AUTO: 371 10E9/L (ref 150–450)
RBC # BLD AUTO: 3.96 10E12/L (ref 4.4–5.9)
WBC # BLD AUTO: 10.2 10E9/L (ref 4–11)

## 2018-08-03 PROCEDURE — 25000132 ZZH RX MED GY IP 250 OP 250 PS 637: Mod: GY | Performed by: INTERNAL MEDICINE

## 2018-08-03 PROCEDURE — 97535 SELF CARE MNGMENT TRAINING: CPT | Mod: GO

## 2018-08-03 PROCEDURE — A9270 NON-COVERED ITEM OR SERVICE: HCPCS | Mod: GY | Performed by: INTERNAL MEDICINE

## 2018-08-03 PROCEDURE — 85610 PROTHROMBIN TIME: CPT | Performed by: INTERNAL MEDICINE

## 2018-08-03 PROCEDURE — 25000132 ZZH RX MED GY IP 250 OP 250 PS 637: Mod: GY | Performed by: PHYSICIAN ASSISTANT

## 2018-08-03 PROCEDURE — 94640 AIRWAY INHALATION TREATMENT: CPT

## 2018-08-03 PROCEDURE — 12000007 ZZH R&B INTERMEDIATE

## 2018-08-03 PROCEDURE — 25000128 H RX IP 250 OP 636: Performed by: PHYSICIAN ASSISTANT

## 2018-08-03 PROCEDURE — 85520 HEPARIN ASSAY: CPT | Performed by: INTERNAL MEDICINE

## 2018-08-03 PROCEDURE — 25000128 H RX IP 250 OP 636: Performed by: INTERNAL MEDICINE

## 2018-08-03 PROCEDURE — 97165 OT EVAL LOW COMPLEX 30 MIN: CPT | Mod: GO

## 2018-08-03 PROCEDURE — 40000133 ZZH STATISTIC OT WARD VISIT

## 2018-08-03 PROCEDURE — 85027 COMPLETE CBC AUTOMATED: CPT | Performed by: INTERNAL MEDICINE

## 2018-08-03 PROCEDURE — 25000125 ZZHC RX 250: Performed by: PHYSICIAN ASSISTANT

## 2018-08-03 PROCEDURE — 99233 SBSQ HOSP IP/OBS HIGH 50: CPT | Performed by: INTERNAL MEDICINE

## 2018-08-03 PROCEDURE — A9270 NON-COVERED ITEM OR SERVICE: HCPCS | Mod: GY | Performed by: PHYSICIAN ASSISTANT

## 2018-08-03 PROCEDURE — 40000275 ZZH STATISTIC RCP TIME EA 10 MIN

## 2018-08-03 PROCEDURE — 36415 COLL VENOUS BLD VENIPUNCTURE: CPT | Performed by: INTERNAL MEDICINE

## 2018-08-03 RX ORDER — PREDNISONE 20 MG/1
40 TABLET ORAL DAILY
Status: DISCONTINUED | OUTPATIENT
Start: 2018-08-04 | End: 2018-08-04 | Stop reason: HOSPADM

## 2018-08-03 RX ORDER — WARFARIN SODIUM 3 MG/1
6 TABLET ORAL
Status: DISCONTINUED | OUTPATIENT
Start: 2018-08-03 | End: 2018-08-03

## 2018-08-03 RX ORDER — WARFARIN SODIUM 5 MG/1
5 TABLET ORAL
Status: COMPLETED | OUTPATIENT
Start: 2018-08-03 | End: 2018-08-03

## 2018-08-03 RX ADMIN — BENZONATATE 100 MG: 100 CAPSULE ORAL at 03:24

## 2018-08-03 RX ADMIN — BENZONATATE 100 MG: 100 CAPSULE ORAL at 16:20

## 2018-08-03 RX ADMIN — AZITHROMYCIN MONOHYDRATE 250 MG: 250 TABLET ORAL at 09:32

## 2018-08-03 RX ADMIN — ACETAMINOPHEN 650 MG: 325 TABLET, FILM COATED ORAL at 09:33

## 2018-08-03 RX ADMIN — METHYLPREDNISOLONE SODIUM SUCCINATE 62.5 MG: 125 INJECTION, POWDER, FOR SOLUTION INTRAMUSCULAR; INTRAVENOUS at 09:32

## 2018-08-03 RX ADMIN — ENOXAPARIN SODIUM 70 MG: 80 INJECTION SUBCUTANEOUS at 09:32

## 2018-08-03 RX ADMIN — GUAIFENESIN 600 MG: 600 TABLET, EXTENDED RELEASE ORAL at 20:15

## 2018-08-03 RX ADMIN — CEFTRIAXONE SODIUM 1 G: 1 INJECTION, POWDER, FOR SOLUTION INTRAMUSCULAR; INTRAVENOUS at 15:44

## 2018-08-03 RX ADMIN — ALBUTEROL SULFATE 2.5 MG: 2.5 SOLUTION RESPIRATORY (INHALATION) at 03:55

## 2018-08-03 RX ADMIN — WARFARIN SODIUM 5 MG: 5 TABLET ORAL at 17:17

## 2018-08-03 RX ADMIN — ENOXAPARIN SODIUM 70 MG: 80 INJECTION SUBCUTANEOUS at 21:12

## 2018-08-03 RX ADMIN — GUAIFENESIN 600 MG: 600 TABLET, EXTENDED RELEASE ORAL at 09:32

## 2018-08-03 ASSESSMENT — ACTIVITIES OF DAILY LIVING (ADL)
ADLS_ACUITY_SCORE: 11
ADLS_ACUITY_SCORE: 12
IADL_COMMENTS: PT HAS SUPPORT OF SPOUSE FOR IADLS AS NEEDED UPON RETURN HOME
ADLS_ACUITY_SCORE: 12
ADLS_ACUITY_SCORE: 11
ADLS_ACUITY_SCORE: 12
ADLS_ACUITY_SCORE: 11

## 2018-08-03 NOTE — PLAN OF CARE
Problem: Patient Care Overview  Goal: Plan of Care/Patient Progress Review  Outcome: Improving  Pt a/o x 4; VSS, pt denies dizziness, & n/v. Pt reports headache from coughing, administered Tylenol. Pt reports SOB, on 4L O2. Heparin gtt discontinued, beginning Lovenox and warfaurin.  INR level 1.32, Hep 10A 0.29. Pt transfers ind in rooo, SBA in hallways. Tele reads SR with tall T waves. Possible discharge in 1-2 days. Will continue to monitor pt.

## 2018-08-03 NOTE — PLAN OF CARE
Problem: Patient Care Overview  Goal: Plan of Care/Patient Progress Review  OT: Order received, eval completed and treatment initiated. Pt is a 72 year old male admitted on 7/30/2018 with productive cough and SOB and on presentation had e/o acute on chronic respiratory failure with sats in the upper 70 % range which promptly responded to supplemental O2,  bilateral pulmonary infiltrates, consistent with COPD with exacerbation 2/2 LRI presumed bacterial. Pt lives with spouse in mobile home, 3 stairs to enter, tub/shower, standard height toilet. Pt reports indep in all ADLs, IADLs and mobility tasks with no assistive device at baseline. Shares household tasks with spouse.     Discharge Planner OT   Patient plan for discharge: Home  Current status: Pt completed bed mobility and transfers in room with supervision/independence, assist provided for O2 cord management. SOB and fatigue noted with activity, O2 sats 92% on 4LPM post activity, educated on PLB technique to slow SOB. Pt able to complete ADL tasks with SBA while seated EOB.  Barriers to return to prior living situation: O2 needs, SOB, decreased activity tolerance  Recommendations for discharge: Home w/ spouse assist as needed  Rationale for recommendations: Pt doing well and anticipate he will continue to make progress to meet goals during inpatient stay. Pt motivated to return home to independent PLOF.        Entered by: Norma Rojas 08/03/2018 4:06 PM

## 2018-08-03 NOTE — PROGRESS NOTES
08/03/18 1527   Quick Adds   Type of Visit Initial Occupational Therapy Evaluation   Living Environment   Lives With spouse   Living Arrangements mobile home   Home Accessibility stairs to enter home;tub/shower is not walk in   Number of Stairs to Enter Home 3   Number of Stairs Within Home 0   Transportation Available car;family or friend will provide   Living Environment Comment Pt lives with spouse in mobile home, 3 stairs to enter, all needs met on main level, tub/shower, standard height toilet.    Self-Care   Dominant Hand right   Usual Activity Tolerance moderate   Current Activity Tolerance moderate   Regular Exercise no   Equipment Currently Used at Home none   Functional Level Prior   Ambulation 0-->independent   Transferring 0-->independent   Toileting 0-->independent   Bathing 0-->independent   Dressing 0-->independent   Eating 0-->independent   Communication 0-->understands/communicates without difficulty   Swallowing 0-->swallows foods/liquids without difficulty   Cognition 0 - no cognition issues reported   Fall history within last six months no   Prior Functional Level Comment Pt reports indep in all ADLS, IADLs and mobility tasks with no AD at baseline. Shares household tasks with spouse.    General Information   Onset of Illness/Injury or Date of Surgery - Date 07/30/18   Referring Physician Stephane Perez MD   Patient/Family Goals Statement Pt's goal is to d/c home   Additional Occupational Profile Info/Pertinent History of Current Problem Per chart: Pt is a 72 year old male [facility], quit [facility] 4 years ago, BPH s/p TURP 6/6/18, admitted on 7/30/2018 with productive cough and SOB and on presentation had e/o acute on chronic respiratory failure with sats in the upper 70 % range which promptly responded to supplemental O2.  Admission evaluation notable for extensive bilateral pulmonary infiltrates, elevated trop at 0.256, and BNP at 9K.  Although  clinically it all seemed most consistent with with COPD with exacerbation 2/2 LRI presumed bacterial.   Precautions/Limitations oxygen therapy device and L/min   Cognitive Status Examination   Orientation orientation to person, place and time   Level of Consciousness alert   Able to Follow Commands WNL/WFL   Personal Safety (Cognitive) mild impairment   Memory impaired   Visual Perception   Visual Perception No deficits were identified   Sensory Examination   Sensory Comments Pt denies numbness/tingling in BUEs   Pain Assessment   Patient Currently in Pain No   Range of Motion (ROM)   ROM Comment WFL   Strength   Strength Comments BUE MMT: 4+/5   Hand Strength   Hand Strength Comments WFL   Coordination   Upper Extremity Coordination No deficits were identified   Mobility   Bed Mobility Comments Initiated - refer to OT daily note for details.    Bed Mobility Skill: Sit to Supine   Level of [place]: Sit/Supine stand-by assist   Physical Assist/Nonphysical Assist: Sit/Supine 1 person assist   Bed Mobility Skill: Supine to Sit   Level of [place]: Supine/Sit stand-by assist   Physical Assist/Nonphysical Assist: Supine/Sit 1 person assist   Transfer Skills   Transfer Comments Initiated - refer to OT daily note for details   Transfer Skill: Bed to Chair/Chair to Bed   Level of [place]: Bed to Chair independent   Transfer Skill: Sit to Stand   Level of [place]: Sit/Stand independent   Transfer Skill: Toilet Transfer   Level of [place]: Toilet independent   Balance   Balance Comments No balance deficits identified   Upper Body Dressing   Level of [place]: Dress Upper Body stand-by assist   Physical Assist/Nonphysical Assist: Dress Upper Body 1 person assist   Lower Body Dressing   Level of [place]: Dress Lower Body stand-by assist   Physical Assist/Nonphysical Assist: Dress Lower Body 1 person assist   Toileting   Level of [place]: Toilet stand-by assist   Physical Assist/Nonphysical  "Assist: Toilet 1 person assist   Grooming   Level of [place]: Grooming stand-by assist   Physical Assist/Nonphysical Assist: Grooming 1 person assist   Instrumental Activities of Daily Living (IADL)   Previous Responsibilities laundry;housekeeping;yardwork;medication management;driving   IADL Comments Pt has support of spouse for IADLs as needed upon return home   Activities of Daily Living Analysis   Impairments Contributing to Impaired Activities of Daily Living (O2 use)   ADL Comments Pt mildly below baseline level of functioning in ADLs, limited by decreased activity tolerance   General Therapy Interventions   Planned Therapy Interventions ADL retraining;IADL retraining;strengthening   Clinical Impression   Criteria for Skilled Therapeutic Interventions Met yes, treatment indicated   OT Diagnosis Impaired ADLs, IADLs and mobility   Influenced by the following impairments Decreased functional activity tolerance, O2 need, SOB   Assessment of Occupational Performance 5 or more Performance Deficits   Identified Performance Deficits Bathing, dressing, grooming, toileting, homemaking, transfers   Clinical Decision Making (Complexity) Low complexity   Therapy Frequency daily   Predicted Duration of Therapy Intervention (days/wks) 3 days   Anticipated Discharge Disposition Home with Assist   Risks and Benefits of Treatment have been explained. Yes   Patient, Family & other staff in agreement with plan of care Yes   Clinical Impression Comments Pt would benefit from skilled OT to maximize safety and indep in all ADLs, IADLs and mobility tasks due to current deficits impacting function    [facility] AM-PAC  \"6 Clicks\" Daily Activity Inpatient Short Form   1. Putting on and taking off regular lower body clothing? 3 - A Little   2. Bathing (including washing, rinsing, drying)? 3 - A Little   3. Toileting, which includes using toilet, bedpan or urinal? 3 - A Little   4. Putting on and taking off regular upper " body clothing? 3 - A Little   5. Taking care of personal grooming such as brushing teeth? 4 - None   6. Eating meals? 4 - None   Daily Activity Raw Score (Score out of 24.Lower scores equate to lower levels of function) 20   Total Evaluation Time   Total Evaluation Time (Minutes) 10

## 2018-08-03 NOTE — PROGRESS NOTES
[facility]  Hospitalist Progress Note  Stephane Perez MD, MD 08/03/2018  (Text Page)  Reason for Stay (Diagnosis): Multifactorial shortness of breath         Assessment and Plan:        Summary of Stay: Mahad Ward is a 72 year old male with a history of O2 dependent COPD at [facility], quit smoking 4 years ago, BPH s/p TURP 6/6/18, admitted on 7/30/2018 with productive cough and SOB and on presentation had e/o acute on chronic respiratory failure with sats in the upper 70 % range which promptly responded to supplemental O2.  Admission evaluation notable for extensive bilateral pulmonary infiltrates, elevated trop at 0.256, and BNP at 9K.  Although clinically it all seemed most consistent with with COPD with exacerbation 2/2 LRI presumed bacterial. There was no e/o sepsis.       He has been placed on steroids/nebs and empiric abx.  Echo on 7/31 significant for pulmonary hypertension and some e/o right heart strain, he was also noted to have asx le edema (which he reports as chronic) and hx of dvt in setting of trauma.  LLE doppler confirmed DVT and so initiated on heparin gtt.  CT PE negative for pulmonary emboli but + for some pleural based nodules     Overnight into 7/31 he had a brief episode of AF with RVR that spontaneously resolved     Problem List:   1. Acute on chronic hypoxic respiratory failure with mild CO2 retention: 2/2 acute copd with exacerbation.   2. Acute on chronic COPD with exacerbation-felt infectious-bacterial vs viral difficult to tell.  procal not particularly impressive at 0.6.  Would complete course of short course of abx (5 days of azith, 7 days ceftriaxone or equivalent).  Resp panel negative, strep urinary agn neg, sputum culture pending.  And for steroid burst 5-7 days, duonebs.    -Change IV Medrol to oral prednisone today.    3. LLE DVT:  Did have TURP last month and has been more immobile of late-initiated on heparin, await pharm liason input as did poorly with  warfarin in the past per his report. Presumably prostate cancer screening up to date in light of recent TURP-but should follow-up with primary MD, thinks he had a colonoscopy recently but unsure of the details-should also f/u with his primary for this.  Has some new pulmonary nodules which will need close f/u as well-see below.  Could be provoked but needs these other issues looked at.   -Start warfarin and assume it is a provoke DVT with recent surgery and hospitalization will need to be an anticoagulation for his DVT at least in the next 3 months however if he has recurrence of atrial fibrillation that might need a lifelong coverage with anticoagulation.  -Stop heparin infusion and change to Lovenox subcu with therapeutic dosing.  -Patient stated that he had no prior bleeding tendencies, he was on warfarin for short period of time several decades ago after he apparently had a blood clot on his lower extremity as he sustained a work-related injury at that time.    4. Pulmonary nodules: high risk-could be inflammatory/infectious vs neoplastic-I'd recommend repeat CT scan in 4 weeks to ensure resolution and if not then needs bx or PET.     5. Elevated troponin:  EKG without acute ST-T findings.  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    Earlier determined that likely not ACS related and will need further risk stratification as outpatient.  Given age/tob hx should get OP stress testing at some point. Would wait til current episode resolved  6. Grade 1 diastolic dysfunction by echo-I think he would benefit from trial of diuresis.  Given furosemide 20 mg IV x1 and  noted no improvement in respiratory status  7. Chronic O2 dependent COPD at 3 L/nc     DVT Prophylaxis: Enoxaparin (Lovenox) SQ  Code Status: Full Code  Functional:  Lives with wife in a house, independent.   Grandson is also staying        Disposition Plan     Expected discharge in 1-2 days  "to prior living arrangement once respiratory status stable.        Interval History (Subjective):      Continuing care today.  Seen and examined.  Chart reviewed.  Case discussed with nursing service.  Bossman appears to be in better spirits as he is able to sleep for several hours last night as his coughing spells as improve.  He feels that his breathing and shortness of breath are also improving.  Remain afebrile.  Tolerating oral diet.  No bleeding tendencies reported.              Physical Exam:      Last Vital Signs:  /79 (BP Location: Right arm)  Pulse 83  Temp 96.2  F (35.7  C) (Oral)  Resp 20  Ht 1.803 m (5' 11\")  Wt 73.7 kg (162 lb 6.4 oz)  SpO2 95%  BMI 22.65 kg/m2    I/O last 3 completed shifts:  In: 150 [P.O.:150]  Out: 2000 [Urine:2000]  Wt Readings from Last 1 Encounters:   08/03/18 73.7 kg (162 lb 6.4 oz)     Vitals:    07/30/18 1449 07/30/18 1850 08/01/18 1135 08/02/18 0652   Weight: 81.6 kg (180 lb) 75.8 kg (167 lb) 76.3 kg (168 lb 3.2 oz) 75.3 kg (166 lb)    08/03/18 0600   Weight: 73.7 kg (162 lb 6.4 oz)       Constitutional: Awake, alert, cooperative, no apparent distress   Respiratory:  Fair air entry, minimal scant wheezing mostly in the left lung fields, bilateral fine inspiratory crackles   Cardiovascular: Regular rate and rhythm, normal S1 and S2, and no murmur noted   Abdomen: Normal bowel sounds, soft, non-distended, non-tender   Skin: No rashes, no cyanosis, dry to touch   Neuro: Alert and oriented x3, no weakness, spontaneous and coherent speech   Extremities:  +1 pitting edema on both lower extremities, normal range of motion   Other(s): Euthymic mood, not agitated       All other systems: Negative          Medications:      All current medications were reviewed with changes reflected in problem list.         Data:      All new lab and imaging data was reviewed.   Labs:    Recent Labs  Lab 07/31/18  0930 07/30/18  1539 07/30/18  1505   CULT Moderate growthNormal rohini No growth " after 4 days No growth after 4 days       Recent Labs  Lab 08/02/18  0630 08/01/18  0418 07/31/18  0613    141 142   POTASSIUM 4.5 4.7 4.8   CHLORIDE 103 106 109   CO2 33* 31 27   ANIONGAP 3 4 6   GLC 80 105* 134*   BUN 22 34* 30   CR 0.69 0.82 0.83   GFRESTIMATED >90 >90 >90   GFRESTBLACK >90 >90 >90   KATELYNN 8.7 8.6 8.4*       Recent Labs  Lab 08/03/18  0705 08/02/18  0630 08/01/18  0418   WBC 10.2 12.1* 16.2*   HGB 11.7* 12.2* 11.4*   HCT 37.7* 39.3* 37.1*   MCV 95 96 97    409 398       Recent Labs  Lab 08/02/18  0630 08/01/18  0418 07/31/18  0613 07/30/18  1505   GLC 80 105* 134* 126*       Recent Labs  Lab 08/03/18  0705 08/02/18  0630   INR 1.32* 1.12       Recent Labs  Lab 07/30/18  2254 07/30/18  1922 07/30/18  1505   TROPI 0.189* 0.213* 0.256*     No results for input(s): COLOR, APPEARANCE, URINEGLC, URINEBILI, URINEKETONE, SG, UBLD, URINEPH, PROTEIN, UROBILINOGEN, NITRITE, LEUKEST, RBCU, WBCU in the last 168 hours.   Imaging:   Recent Results (from the past 48 hour(s))   US Lower Extremity Venous Duplex Left    Narrative    VENOUS ULTRASOUND LEFT LEG  7/31/2018 6:27 PM     HISTORY: Asymptomatic edema, evaluate for clot in one with  acute-on-chronic hypoxic respiratory failure.     COMPARISON: None.    FINDINGS: Examination of the deep veins with graded compression and  color flow Doppler with spectral wave form analysis was performed.   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 There is partially occlusive deep venous thrombus in one  of these duplicated veins which extends into the popliteal vein.      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 This is partially occlusive.    CLARA BA MD   CT Chest Pulmonary Embolism w Contrast    Narrative    CT CHEST PULMONARY EMBOLISM WITH CONTRAST 7/31/2018 9:24 PM     HISTORY: DVT. Hypoxia, rule  out PE.     CONTRAST DOSE:  64 mL Isovue-[id number]    Radiation dose for this scan was reduced using automated exposure  control, adjustment of the mA and/or kV according to patient size, or  iterative reconstruction technique.    FINDINGS:  There is a good contrast bolus within the pulmonary  arteries. There are no pulmonary arterial filling defects to indicate  pulmonary embolism. Contrast enhancement within the aorta is less  optimal. However, no aortic dissection is demonstrated. Nonenlarged  bilateral hilar lymph nodes are noted. Within the lateral segment of  the right middle lobe, there is a pleural-based 2.0 x 2.5 cm opacity  or mass. A smaller similar of pleural-based opacity or mass is also  noted laterally within the left lower lobe at the left lung base.  Miliary nodular pattern is noted throughout both lungs involving upper  and lower lobes. No pleural effusion or pneumothorax is demonstrated.  Nonspecific 1.3 cm hypodensity is present within the right lobe of the  liver. Hepatic fatty infiltration is noted.      Impression    IMPRESSION:  1. No CT evidence of pulmonary embolism.  2. Right middle lobe lateral segment pleural-based 2.5 x 2.0 cm  ill-defined opacity which could represent neoplastic or inflammatory  disease. Similar pleural-based opacity is noted laterally in the left  lower lung near the lung base.  3. Miliary nodular opacities throughout both upper and lower lungs of  indeterminate etiology. Again, this could represent neoplastic versus  inflammatory disease.    SHENA KIRKPATRICK MD

## 2018-08-03 NOTE — PLAN OF CARE
Problem: Patient Care Overview  Goal: Plan of Care/Patient Progress Review  Outcome: No Change  Tessalon per PRN order given, denies pain. VS stable. Continue Heparin gtt, Coumadin started. Continue to monitor.

## 2018-08-03 NOTE — PLAN OF CARE
Problem: Patient Care Overview  Goal: Plan of Care/Patient Progress Review  Outcome: Improving  VSS alert/ oriented denies on 4 liters of oxygen continue to have strong productive cough with yellow/white sputum continues on the heparin gtt at 16.5ml/hr. Ambulates with SBA trace edema on BLE on IV rocephin for PNA. Tele NSR.

## 2018-08-04 ENCOUNTER — APPOINTMENT (OUTPATIENT)
Dept: PHYSICAL THERAPY | Facility: CLINIC | Age: 73
DRG: 193 | End: 2018-08-04
Attending: INTERNAL MEDICINE
Payer: MEDICARE

## 2018-08-04 VITALS
BODY MASS INDEX: 22.73 KG/M2 | DIASTOLIC BLOOD PRESSURE: 75 MMHG | RESPIRATION RATE: 18 BRPM | SYSTOLIC BLOOD PRESSURE: 132 MMHG | HEART RATE: 75 BPM | OXYGEN SATURATION: 93 % | HEIGHT: 71 IN | WEIGHT: 162.4 LBS | TEMPERATURE: 97 F

## 2018-08-04 LAB
INR PPP: 1.78 (ref 0.86–1.14)
LMWH PPP CHRO-ACNC: 0.14 IU/ML
PLATELET # BLD AUTO: 358 10E9/L (ref 150–450)

## 2018-08-04 PROCEDURE — 97161 PT EVAL LOW COMPLEX 20 MIN: CPT | Mod: GP | Performed by: PHYSICAL THERAPIST

## 2018-08-04 PROCEDURE — 97110 THERAPEUTIC EXERCISES: CPT | Mod: GP | Performed by: PHYSICAL THERAPIST

## 2018-08-04 PROCEDURE — 36415 COLL VENOUS BLD VENIPUNCTURE: CPT | Performed by: INTERNAL MEDICINE

## 2018-08-04 PROCEDURE — 25000132 ZZH RX MED GY IP 250 OP 250 PS 637: Mod: GY | Performed by: INTERNAL MEDICINE

## 2018-08-04 PROCEDURE — 85520 HEPARIN ASSAY: CPT | Performed by: INTERNAL MEDICINE

## 2018-08-04 PROCEDURE — 25000132 ZZH RX MED GY IP 250 OP 250 PS 637: Mod: GY | Performed by: PHYSICIAN ASSISTANT

## 2018-08-04 PROCEDURE — 85610 PROTHROMBIN TIME: CPT | Performed by: INTERNAL MEDICINE

## 2018-08-04 PROCEDURE — A9270 NON-COVERED ITEM OR SERVICE: HCPCS | Mod: GY | Performed by: INTERNAL MEDICINE

## 2018-08-04 PROCEDURE — 25000125 ZZHC RX 250: Performed by: INTERNAL MEDICINE

## 2018-08-04 PROCEDURE — 25000128 H RX IP 250 OP 636: Performed by: INTERNAL MEDICINE

## 2018-08-04 PROCEDURE — A9270 NON-COVERED ITEM OR SERVICE: HCPCS | Mod: GY | Performed by: PHYSICIAN ASSISTANT

## 2018-08-04 PROCEDURE — 99239 HOSP IP/OBS DSCHRG MGMT >30: CPT | Performed by: INTERNAL MEDICINE

## 2018-08-04 PROCEDURE — 85049 AUTOMATED PLATELET COUNT: CPT | Performed by: INTERNAL MEDICINE

## 2018-08-04 PROCEDURE — 40000193 ZZH STATISTIC PT WARD VISIT: Performed by: PHYSICAL THERAPIST

## 2018-08-04 RX ORDER — BENZONATATE 100 MG/1
100 CAPSULE ORAL 3 TIMES DAILY PRN
Qty: 15 CAPSULE | Refills: 0 | Status: SHIPPED | OUTPATIENT
Start: 2018-08-04 | End: 2018-08-09

## 2018-08-04 RX ORDER — WARFARIN SODIUM 4 MG/1
4 TABLET ORAL DAILY
Qty: 7 TABLET | Refills: 0 | Status: SHIPPED | OUTPATIENT
Start: 2018-08-04 | End: 2018-11-25

## 2018-08-04 RX ORDER — PREDNISONE 20 MG/1
TABLET ORAL
Qty: 5 TABLET | Refills: 0 | Status: SHIPPED | OUTPATIENT
Start: 2018-08-04 | End: 2018-11-25

## 2018-08-04 RX ORDER — WARFARIN SODIUM 5 MG/1
5 TABLET ORAL
Status: DISCONTINUED | OUTPATIENT
Start: 2018-08-04 | End: 2018-08-04 | Stop reason: HOSPADM

## 2018-08-04 RX ADMIN — GUAIFENESIN 600 MG: 600 TABLET, EXTENDED RELEASE ORAL at 07:49

## 2018-08-04 RX ADMIN — ENOXAPARIN SODIUM 70 MG: 80 INJECTION SUBCUTANEOUS at 09:29

## 2018-08-04 RX ADMIN — PREDNISONE 40 MG: 20 TABLET ORAL at 07:48

## 2018-08-04 RX ADMIN — BENZONATATE 100 MG: 100 CAPSULE ORAL at 07:48

## 2018-08-04 RX ADMIN — BENZONATATE 100 MG: 100 CAPSULE ORAL at 12:50

## 2018-08-04 RX ADMIN — ACETAMINOPHEN 650 MG: 325 TABLET, FILM COATED ORAL at 09:43

## 2018-08-04 ASSESSMENT — PAIN DESCRIPTION - DESCRIPTORS: DESCRIPTORS: HEADACHE

## 2018-08-04 ASSESSMENT — ACTIVITIES OF DAILY LIVING (ADL)
ADLS_ACUITY_SCORE: 11
ADLS_ACUITY_SCORE: 12
ADLS_ACUITY_SCORE: 11
ADLS_ACUITY_SCORE: 12

## 2018-08-04 NOTE — PROGRESS NOTES
08/04/18 0919   Quick Adds   Type of Visit Initial PT Evaluation   Living Environment   Lives With spouse   Living Arrangements mobile home   Home Accessibility stairs to enter home;tub/shower is not walk in   Number of Stairs to Enter Home 3   Number of Stairs Within Home 0   Stair Railings at Home outside, present on left side   Transportation Available family or friend will provide   Living Environment Comment Pt lives with spouse in mobile home, 3 stairs to enter, all needs met on main level, tub/shower, standard height toilet.    Self-Care   Dominant Hand right   Usual Activity Tolerance moderate   Current Activity Tolerance moderate   Regular Exercise no   Equipment Currently Used at Home none   Activity/Exercise/Self-Care Comment Indep PLOF   Functional Level Prior   Ambulation 0-->independent   Transferring 0-->independent   Toileting 0-->independent   Bathing 0-->independent   Dressing 0-->independent   Eating 0-->independent   Communication 0-->understands/communicates without difficulty   Swallowing 0-->swallows foods/liquids without difficulty   Cognition 0 - no cognition issues reported   Fall history within last six months no   Which of the above functional risks had a recent onset or change? ambulation   Prior Functional Level Comment Pt states he can amb funtional grocery store distances with use of cart, has portable O2 concentrator up to 3Lpm he uses with activity, no O2 at rest.   General Information   Onset of Illness/Injury or Date of Surgery - Date 08/03/18   Referring Physician Stephane Perez MD   Patient/Family Goals Statement return home today or tomorrow   Pertinent History of Current Problem (include personal factors and/or comorbidities that impact the POC) Mahad Ward is a 72 year old male with a history of O2 dependent COPD at [facility], quit smoking 4 years ago, BPH s/p TURP 6/6/18, admitted on 7/30/2018 with productive cough and SOB and on presentation had e/o acute on chronic  "respiratory failure with sats in the upper 70 % range which promptly responded to supplemental O2.  Admission evaluation notable for extensive bilateral pulmonary infiltrates, elevated trop at 0.256, and BNP at 9K.  Although clinically it all seemed most consistent with with COPD with exacerbation 2/2 LRI presumed bacterial. There was no e/o sepsis.    Currently, pt reports feeling \"much better\".   Precautions/Limitations oxygen therapy device and L/min  (currently on 3Lpm O2)   General Observations 3Lpm O2 currently, SaO2 92%.   Cognitive Status Examination   Orientation orientation to person, place and time   Pain Assessment   Patient Currently in Pain No   Integumentary/Edema   Integumentary/Edema Comments mild generalized swelling B lower extremities, redness L lower leg   Posture    Posture Forward head position   Range of Motion (ROM)   ROM Comment WFL BLEs to > 100 deg flexion, AROM shoulders overhead   Strength   Strength Comments 5/5 B knee ext, 4+/5 B hip flexion, 5/5 B ankle DF, WFL for mobility, generalized deconditioning assumed   Bed Mobility   Bed Mobility Comments Indep supine > sit EOB with HOB elevated   Transfer Skills   Transfer Comments SBA/indep sit <> stand transfers, good functional standing balance, no UE support needed   Gait   Gait Comments Amb with reciprocal gait pattern, no AD, mild imbalance at times but no LOB, SOB reported, on 3Lpm O2   Balance   Balance Comments Rhomberg EO x 30 sec steady, EC x 10 sec mild sway, mostly steady gait with mild dynamic gait impairement, no overt LOB, no AD, low fall risk.   Sensory Examination   Sensory Perception Comments baseline mild numbness in feet   Coordination   Coordination no deficits were identified   Muscle Tone   Muscle Tone no deficits were identified   General Therapy Interventions   Planned Therapy Interventions gait training;risk factor education;home program guidelines;progressive activity/exercise;strengthening   Clinical Impression " "  Criteria for Skilled Therapeutic Intervention yes, treatment indicated   PT Diagnosis Impaired functional activity tolerance and endurance   Influenced by the following impairments O2 needs, deconditioning, mild swelling LEs, SOB with activity   Functional limitations due to impairments Impaired tolerance with functional amb distances, SOB/increased O2 needs with activity   Clinical Presentation Stable/Uncomplicated   Clinical Presentation Rationale improving status, indep PLOF, current O2 needs   Clinical Decision Making (Complexity) Low complexity   Therapy Frequency` daily   Predicted Duration of Therapy Intervention (days/wks) 1 day   Anticipated Equipment Needs at Discharge (none)   Anticipated Discharge Disposition Home   Risk & Benefits of therapy have been explained Yes   Patient, Family & other staff in agreement with plan of care Yes   Clinical Impression Comments will benefit from 1 PT session to education/train on proper activity guidelines/progressive walking/exercise program.   [facility] [employer] TM \"6 Clicks\"   2016, Trustees of [facility], under license to [employer].  All rights reserved.   6 Clicks Short Forms Basic Mobility Inpatient Short Form   [facility] [employer]  \"6 Clicks\" V.2 Basic Mobility Inpatient Short Form   1. Turning from your back to your side while in a flat bed without using bedrails? 4 - None   2. Moving from lying on your back to sitting on the side of a flat bed without using bedrails? 4 - None   3. Moving to and from a bed to a chair (including a wheelchair)? 4 - None   4. Standing up from a chair using your arms (e.g., wheelchair, or bedside chair)? 4 - None   5. To walk in hospital room? 3 - A Little   6. Climbing 3-5 steps with a railing? 3 - A Little   Basic Mobility Raw Score (Score out of 24.Lower scores equate to lower levels of function) 22   Total Evaluation Time   Total Evaluation Time (Minutes) 15     "

## 2018-08-04 NOTE — DISCHARGE SUMMARY
[facility]  Discharge Summary  Name: Mahad Ward    MRN: [id number]  YOB: 1945    Age: 72 year old  Date of Discharge:  8/4/2018  Date of Admission: 7/30/2018  Primary Care Provider: González Cornelius  Discharge Physician:  Stephane Perez MD  Discharging Service:  Hospitalist      Discharge Diagnosis:  Acute on chronic hypoxic respiratory failure secondary to COPD and acute exacerbation with pulmonary hypertension and cor pulmonale  Bilateral opacity and infiltrates suspected infectious in etiology with possible pneumonia cannot entirely rule out bacterial in etiology with moderate elevation of pro-calcitonin and earlier leukocytosis  Left lower extremity acute DVT likely provoke  Findings of pulmonary nodules inflammatory versus infectious or neoplastic  Elevated troponin I likely demand ischemia from #1  Grade 1 diastolic dysfunction     Other Diagnosis:  Past Medical History:   Diagnosis Date     BPH (benign prostatic hyperplasia)      COPD (chronic obstructive pulmonary disease) (H)           Discharge Disposition:  Discharged to home     Allergies:  Allergies   Allergen Reactions     Levaquin [Levofloxacin]         Discharge Medications:   Current Discharge Medication List      START taking these medications    Details   amoxicillin-clavulanate (AUGMENTIN) 875-125 MG per tablet Take 1 tablet by mouth 2 times daily for 1 day  Qty: 2 tablet, Refills: 0    Associated Diagnoses: COPD exacerbation (H)      benzonatate (TESSALON) 100 MG capsule Take 1 capsule (100 mg) by mouth 3 times daily as needed for cough  Qty: 15 capsule, Refills: 0    Associated Diagnoses: COPD exacerbation (H)      enoxaparin (LOVENOX) 80 MG/0.8ML injection Inject 0.74 mLs (74 mg) Subcutaneous every 12 hours  Qty: 2 Syringe, Refills: 0    Associated Diagnoses: Acute deep vein thrombosis (DVT) of left lower extremity, unspecified vein (H)      predniSONE (DELTASONE) 20 MG tablet Take 2 tabs (40 mg) by mouth daily x 1  "day, 1 tabs (20 mg) daily x 2 days, 1/2 tab (10 mg) daily x 2 days.  Qty: 5 tablet, Refills: 0    Associated Diagnoses: COPD exacerbation (H)      warfarin (COUMADIN) 4 MG tablet Take 1 tablet (4 mg) by mouth daily  Qty: 7 tablet, Refills: 0    Comments: Repeat INR this coming Monday and adjust accordingly 8/6/2018  Associated Diagnoses: Acute deep vein thrombosis (DVT) of left lower extremity, unspecified vein (H)         CONTINUE these medications which have NOT CHANGED    Details   Cholecalciferol (VITAMIN D3) 2000 units TABS Take 2,000 Units by mouth every morning      Fluticasone-Salmeterol (AIRDUO RESPICLICK) 113-14 MCG/ACT AEPB inhaler Inhale 1 puff into the lungs 2 times daily      Ipratropium-Albuterol (COMBIVENT RESPIMAT)  MCG/ACT inhaler Inhale 1 puff into the lungs every 4 hours       Multiple Vitamins-Minerals (CENTRUM SILVER) per tablet Take 1 tablet by mouth daily              Condition on Discharge:  Discharge condition: Stable   Discharge vitals: Blood pressure 132/75, pulse 75, temperature 97  F (36.1  C), temperature source Oral, resp. rate 18, height 1.803 m (5' 11\"), weight 73.7 kg (162 lb 6.4 oz), SpO2 98 %.   Code status on discharge: Full Code     History of Present Illness:  See detailed admission note for full details.        Significant Physical Exam Findings Day of Discharge:  HEENT; Atraumatic, normocephalic, pinkish conjuctiva, pupils bilateral reactive   Skin: warm and moist, no rashes  Lymphatics: no cervical or axillary lymphandenopathy  Lungs: Fair air entry, no further crackles heard, very minimal wheezing on left lower lung field  Heart: normal rate, normal rhythm, no rubs or gallops.   Abdomen: normal bowel sounds, no tenderness, no peritoneal signs, no guarding  Extremities: no deformities, no edema   Neuro; follow commands, alert and oriented x3, spontaneous speech, coherent, moves all extremities spontaneously  Psych; no hallucination, euthymic mood, not " agitated    Procedures other than Imaging:  None     Imaging:  Results for orders placed or performed during the hospital encounter of 07/30/18   XR Chest 2 Views    Narrative    XR CHEST 2 VW 7/30/2018 3:14 PM    HISTORY: Respiratory Distress;       Impression    IMPRESSION: Extensive patchy bilateral interstitial infiltrates.    LENORA WHATLEY MD   US Lower Extremity Venous Duplex Left    Narrative    VENOUS ULTRASOUND LEFT LEG  7/31/2018 6:27 PM     HISTORY: Asymptomatic edema, evaluate for clot in one with  acute-on-chronic hypoxic respiratory failure.     COMPARISON: None.    FINDINGS: Examination of the deep veins with graded compression and  color flow Doppler with spectral wave form analysis was performed.   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 There is partially occlusive deep venous thrombus in one  of these duplicated veins which extends into the popliteal vein.      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 This is partially occlusive.    CLARA BA MD   CT Chest Pulmonary Embolism w Contrast    Narrative    CT CHEST PULMONARY EMBOLISM WITH CONTRAST 7/31/2018 9:24 PM     HISTORY: DVT. Hypoxia, rule out PE.     CONTRAST DOSE:  64 mL [id number]    Radiation dose for this scan was reduced using automated exposure  control, adjustment of the mA and/or kV according to patient size, or  iterative reconstruction technique.    FINDINGS:  There is a good contrast bolus within the pulmonary  arteries. There are no pulmonary arterial filling defects to indicate  pulmonary embolism. Contrast enhancement within the aorta is less  optimal. However, no aortic dissection is demonstrated. Nonenlarged  bilateral hilar lymph nodes are noted. Within the lateral segment of  the right middle lobe, there is a pleural-based 2.0 x 2.5 cm opacity  or mass. A smaller  similar of pleural-based opacity or mass is also  noted laterally within the left lower lobe at the left lung base.  Miliary nodular pattern is noted throughout both lungs involving upper  and lower lobes. No pleural effusion or pneumothorax is demonstrated.  Nonspecific 1.3 cm hypodensity is present within the right lobe of the  liver. Hepatic fatty infiltration is noted.      Impression    IMPRESSION:  1. No CT evidence of pulmonary embolism.  2. Right middle lobe lateral segment pleural-based 2.5 x 2.0 cm  ill-defined opacity which could represent neoplastic or inflammatory  disease. Similar pleural-based opacity is noted laterally in the left  lower lung near the lung base.  3. Miliary nodular opacities throughout both upper and lower lungs of  indeterminate etiology. Again, this could represent neoplastic versus  inflammatory disease.    SHENA KIRKPATRICK MD        Consultations:  No consultations were requested during this admission.     Recent Lab Results:    Recent Labs  Lab 08/04/18  0551 08/03/18  0705 08/02/18  0630 08/01/18  0418   WBC  --  10.2 12.1* 16.2*   HGB  --  11.7* 12.2* 11.4*   HCT  --  37.7* 39.3* 37.1*   MCV  --  95 96 97    371 409 398       Recent Labs  Lab 07/31/18  0930 07/30/18  1539 07/30/18  1505   CULT Moderate growthNormal rohini No growth after 5 days No growth after 5 days       Recent Labs  Lab 08/02/18  0630 08/01/18  0418 07/31/18  0613 07/30/18  1505    141 142 140   POTASSIUM 4.5 4.7 4.8 5.0   CHLORIDE 103 106 109 104   CO2 33* 31 27 29   ANIONGAP 3 4 6 7   GLC 80 105* 134* 126*   BUN 22 34* 30 38*   CR 0.69 0.82 0.83 1.27*   GFRESTIMATED >90 >90 >90 55*   GFRESTBLACK >90 >90 >90 67   KATELYNN 8.7 8.6 8.4* 9.0   MAG  --   --  2.5*  --    PROTTOTAL  --   --   --  8.1   ALBUMIN  --   --   --  2.5*   BILITOTAL  --   --   --  0.8   ALKPHOS  --   --   --  133   AST  --   --   --  19   ALT  --   --   --  27       Recent Labs  Lab 08/02/18  0630 08/01/18  0418 07/31/18  0613  07/30/18  1505   GLC 80 105* 134* 126*       Recent Labs  Lab 07/30/18  1542   LACT 1.4       Recent Labs  Lab 07/30/18  2254 07/30/18  1922 07/30/18  1505   TROPI 0.189* 0.213* 0.256*     No results for input(s): COLOR, APPEARANCE, URINEGLC, URINEBILI, URINEKETONE, SG, UBLD, URINEPH, PROTEIN, UROBILINOGEN, NITRITE, LEUKEST, RBCU, WBCU in the last 168 hours.       Pending Results:    Unresulted Labs Ordered in the Past 30 Days of this Admission     Date and Time Order Name Status Description    7/30/2018 1510 Blood culture ONE site Preliminary     7/30/2018 1506 Blood culture ONE site Preliminary            Discharge Instructions and Follow-Up:   Discharge diet:   Active Diet Order      Combination Diet Regular Diet Adult      Diet   Discharge activity: Activity as tolerated   Discharge follow-up: 1 week with PCP, repeat INR this coming Monday.   Outpatient therapy: None    Other instructions:  I spoke earlier with patient's primary care physician over the phone and discussed with the current diagnosis and plan of care here and the need for follow-up with recent diagnosis of DVT currently on anticoagulation and earlier CT scan findings that might need further imaging in the near future.     Hospital Course:  Bossman is currently doing well and back to his baseline home oxygen settings.  He feels a whole lot better with his breathing status as he can tolerate oral diet, ambulating in his room and denies any worsening symptoms of shortness of breath.  He denies any coughing spells no reported fever, tolerating diet with no nausea or vomiting.  No reported bleeding tendencies.  He is requesting for home discharge today and reassured me that he can likely finish the course of Lovenox can be given subcutaneously at least 2 more dosing.  His INR is on trending up at 1.7 and he received already 4 days of heparin/Lovenox while on warfarin.  He was ruled out for pulmonary embolism but will has multiple findings on his CT scan  imaging of the chest.  It was felt likely that he has underlying infectious process as well provided with antibiotics receive IV 6 day course here and will be finishing an oral Augmentin in the next 2 more doses upon discharge.  He will likely benefit for repeat imaging to monitor these findings and hopefully rule out any malignant processes.    Please refer to excerpts of my prior progress notes for other details of his hospitalization here as listed below.    Summary of Stay: Mahad Ward is a 72 year old male with a history of O2 dependent COPD at [facility], quit smoking 4 years ago, BPH s/p TURP 6/6/18, admitted on 7/30/2018 with productive cough and SOB and on presentation had e/o acute on chronic respiratory failure with sats in the upper 70 % range which promptly responded to supplemental O2.  Admission evaluation notable for extensive bilateral pulmonary infiltrates, elevated trop at 0.256, and BNP at 9K.  Although clinically it all seemed most consistent with with COPD with exacerbation 2/2 LRI presumed bacterial. There was no e/o sepsis.        He has been placed on steroids/nebs and empiric abx.  Echo on 7/31 significant for pulmonary hypertension and some e/o right heart strain, he was also noted to have asx le edema (which he reports as chronic) and hx of dvt in setting of trauma.  LLE doppler confirmed DVT and so initiated on heparin gtt.  CT PE negative for pulmonary emboli but + for some pleural based nodules      Overnight into 7/31 he had a brief episode of AF with RVR that spontaneously resolved      Problem List:   1. Acute on chronic hypoxic respiratory failure with mild CO2 retention: 2/2 acute copd with exacerbation.   2. Acute on chronic COPD with exacerbation-felt infectious-bacterial vs viral difficult to tell.  procal not particularly impressive at 0.6.  Would complete course of short course of abx (5 days of azith, 7 days ceftriaxone or equivalent).  Resp panel negative, strep urinary agn  neg, sputum culture pending.  And for steroid burst 5-7 days, fernanda.    -Change IV Medrol to oral prednisone today.     3. LLE DVT:  Did have TURP last month and has been more immobile of late-initiated on heparin, await pharm liason input as did poorly with warfarin in the past per his report. Presumably prostate cancer screening up to date in light of recent TURP-but should follow-up with primary MD, thinks he had a colonoscopy recently but unsure of the details-should also f/u with his primary for this.  Has some new pulmonary nodules which will need close f/u as well-see below.  Could be provoked but needs these other issues looked at.   -Start warfarin and assume it is a provoke DVT with recent surgery and hospitalization will need to be an anticoagulation for his DVT at least in the next 3 months however if he has recurrence of atrial fibrillation that might need a lifelong coverage with anticoagulation.  -Stop heparin infusion and change to Lovenox subcu with therapeutic dosing.  -Patient stated that he had no prior bleeding tendencies, he was on warfarin for short period of time several decades ago after he apparently had a blood clot on his lower extremity as he sustained a work-related injury at that time.     4. Pulmonary nodules: high risk-could be inflammatory/infectious vs neoplastic-I'd recommend repeat CT scan in 4 weeks to ensure resolution and if not then needs bx or PET.      5. Elevated troponin:  EKG without acute ST-T findings.  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    Earlier determined that likely not ACS related and will need further risk stratification as outpatient.  Given age/tob hx should get OP stress testing at some point. Would wait til current episode resolved  6. Grade 1 diastolic dysfunction by echo-I think he would benefit from trial of diuresis.  Given furosemide 20 mg IV x1 and  noted no improvement in  respiratory status  7. Chronic O2 dependent COPD at 3 L/nc      DVT Prophylaxis: Enoxaparin (Lovenox) SQ     Total time spent in face to face contact with the patient and coordinating discharge was:  > 30 Minutes.

## 2018-08-04 NOTE — PLAN OF CARE
Problem: Patient Care Overview  Goal: Plan of Care/Patient Progress Review  Outcome: No Change  A/Ox4, Independent in room, Regular diet, Lovenox injections, O2 4L NC, Tele SR with peaked T waves, continues with frequent productive cough, possible discharge 1-2 days, continue with plan of care.

## 2018-08-04 NOTE — PHARMACY-ANTICOAGULATION SERVICE
Clinical Pharmacy- Warfarin Discharge Note  This patient is currently on warfarin for the treatment of DVT.  INR Goal= 2-3  Expected length of therapy undetermined.           Anticoagulation Dose History     Recent Dosing and Labs Latest Ref Rng & Units 8/2/2018 8/3/2018 8/4/2018    Warfarin 5 mg - - 5 mg -    Warfarin 7.5 mg - 7.5 mg - -    INR 0.86 - 1.14 1.12 1.32(H) 1.78(H)          Vitamin K doses administered during the last 7 days:   FFP administered during the last 7 days:     Patient is discharged on warfarin 4 mg for the next 2 days and Lovenox 70 mg bid for bridging.  INR will be rechecked on Monday, August 6th.

## 2018-08-04 NOTE — PLAN OF CARE
Problem: Patient Care Overview  Goal: Plan of Care/Patient Progress Review  Outcome: Improving  A&O X4. Up independently in room. VSS on 3L NC, needing 4L NC with activity. Mild headache, PRN Tylenol given. C/o productive cough, tessalon pearls given. IV Rocephin for infection. INR 1.78, Coumadin and Lovenox for LLE DVT. Tele SR, peaked T waves, PVCs. PT/OT.     Plan to discharge today at 1400 with ride from wife alongside medications. Education provided on Lovenox and Coumadin, administration demonstrated with teach back. Increased oxygen need order faxed to patient's oxygen provider, new tank brought to room before discharge.

## 2018-08-04 NOTE — PROGRESS NOTES
Hand-off for Care Transitions to Next Level of Care Provider  Name: Mahad Ward  : 1945  MRN #: [id number]  Reason for Hospitalization:  Renal insufficiency [N28.9]  Hypoxia [R09.02]  COPD exacerbation (H) [J44.1]  Admit Date/Time: 2018  3:05 PM  Discharge Date: 2018    Reason for Communication Hand-off Referral: Admission diagnoses: COPD    Discharge Plan:  Discharged to: Home with support                   Patient agreeable to post-hospital support suggestions:  Yes    Patient is on new medications:   Yes    MTM follow up recommended: No    Tel-Assurance program:  Ineligible    Follow-up specialty is recommended: No.     Follow-up plan:  No future appointments.    Any outstanding tests or procedures:   Yes. Recommend repeat INR level on Monday, 18 with adjustment of Coumadin dosing per results.     Procedures     Future Labs/Procedures    Oxygen Adult     Comments:    [facility] Order 4-5 liter(s) by nasal cannula pulse dosing with use of portable tank. Expected treatment length is indefinite (99 months).. Test on conserving device as applicable.    Patients who qualify for home O2 coverage under the CMS guidelines require ABG tests or O2 sat readings obtained closest to, but no earlier than 2 days prior to the discharge, as evidence of the need for home oxygen therapy. Testing must be performed while patient is in the chronic stable state. See notes for O2 sats.    I certify that this patient, Mahad Ward has been under my care and that I, or a nurse practitioner or physician's assistant working with me, had a face-to-face encounter that meets the face-to-face encounter requirements with this patient on 2018. The patient, Mahad Ward was evaluated or treated in whole, or in part, for the following medical condition, which necessitates the use of the ordered oxygen. Treatment Diagnosis: acute on chronic hypoxic respiratory failure with COPD and cor pulmonale    Attending  Provider: Stephane Perez MD  Physician signature: See electronic signature associated with these discharge orders  Date of Order: August 4, 2018          Key Recommendations:   Patient currently receives the following services:  Home O2- [facility]       Patient admitted with COPD exacerbation.Patient receiving nebs and steroids in hospital. Patient does not have a neb machine at home. Given and reviewed COPD action sheet with patient. Reinforced to monitor for COPD symptoms daily and utilize action plan for seeking medical care.   Patient will need close monitoring for COPD symptoms, monitor BNP as was elevated 9383, and may need neb machine for home.    Sunshine De La Rosa    Communicated handoff via [employer] [facility] to Dr. González Cornelius's CC at [phone].     Sent by Mary Obrien RN, BSN, CTS  [facility]  [phone]

## 2018-08-04 NOTE — PLAN OF CARE
Problem: Patient Care Overview  Goal: Plan of Care/Patient Progress Review  Occupational Therapy Discharge Summary    Reason for therapy discharge:    Discharged to home.    Progress towards therapy goal(s). See goals on Care Plan in [facility] electronic health record for goal details.  Goals not met.  Barriers to achieving goals:   discharge from facility.    Therapy recommendation(s):    No further therapy is recommended.         *Patient not seen by discharging OT, information for discharge summary was taken from treating OT notes.*

## 2018-08-04 NOTE — PLAN OF CARE
Problem: Patient Care Overview  Goal: Plan of Care/Patient Progress Review    PT:  Eval and treatment complete. Pt admitted 7/30/18 with acute on chronic COPD exacerbation/hypoxic respiratory failure.  At baseline he lives with spouse and grandson in a mobile home, functions independently at baseline, uses 3lpm O2 with activity, no O2 at rest.      Discharge Planner PT   Patient plan for discharge: home   Current status: Education/training provided with mobility skills for PLB, pacing, and postural cues for improved tolerance and pulmonary function.  He feels as if he is returning close to his baseline now.  Amb hallway distances with good tolerance, SBA/indep, good overall balance, no assistive device needs.  May need to consider increased continous O2 needs at time of discharge   instead of only with activity as he did prior to admission.    Oxygen readings:   * O2 at 3 liters/minute (at rest) ...SPO2 92%  * O2 at 3 liters/minute (during activity/with exercise) ...SPO2 87-89%    Barriers to return to prior living situation: none  Recommendations for discharge: home with spouse, likely increased continuous O2 needs  Rationale for recommendations: Appears to be close to his baseline functional status, does not require ongoing skilled IP PT services.  Ed pt on progressive home walking program.       Entered by: Jasbir Shah 08/04/2018 9:36 AM       Physical Therapy Discharge Summary    Reason for therapy discharge:    All goals and outcomes met, no further needs identified.    Progress towards therapy goal(s). See goals on Care Plan in [facility] electronic health record for goal details.  Goals met    Therapy recommendation(s):    Continue home exercise program.

## 2018-08-05 LAB
BACTERIA SPEC CULT: NO GROWTH
BACTERIA SPEC CULT: NO GROWTH
Lab: NORMAL
Lab: NORMAL
SPECIMEN SOURCE: NORMAL
SPECIMEN SOURCE: NORMAL

## 2018-10-01 ENCOUNTER — HOSPITAL ENCOUNTER (OUTPATIENT)
Dept: CT IMAGING | Facility: CLINIC | Age: 73
Discharge: HOME | End: 2018-10-01
Attending: INTERNAL MEDICINE | Admitting: INTERNAL MEDICINE
Payer: MEDICARE

## 2018-10-01 DIAGNOSIS — R91.8 OTHER NONSPECIFIC ABNORMAL FINDING OF LUNG FIELD (CODE): ICD-10-CM

## 2018-10-01 PROCEDURE — 71250 CT THORAX DX C-: CPT

## 2018-11-25 ENCOUNTER — APPOINTMENT (OUTPATIENT)
Dept: GENERAL RADIOLOGY | Facility: CLINIC | Age: 73
DRG: 194 | End: 2018-11-25
Attending: EMERGENCY MEDICINE
Payer: MEDICARE

## 2018-11-25 ENCOUNTER — HOSPITAL ENCOUNTER (INPATIENT)
Facility: CLINIC | Age: 73
LOS: 2 days | Discharge: HOME | DRG: 194 | End: 2018-11-27
Attending: EMERGENCY MEDICINE | Admitting: INTERNAL MEDICINE
Payer: MEDICARE

## 2018-11-25 DIAGNOSIS — J18.9 COMMUNITY ACQUIRED PNEUMONIA OF RIGHT LOWER LOBE OF LUNG: ICD-10-CM

## 2018-11-25 DIAGNOSIS — J44.0 CHRONIC OBSTRUCTIVE PULMONARY DISEASE WITH ACUTE LOWER RESPIRATORY INFECTION (H): ICD-10-CM

## 2018-11-25 LAB
ANION GAP SERPL CALCULATED.3IONS-SCNC: 6 MMOL/L (ref 3–14)
BASOPHILS # BLD AUTO: 0 10E9/L (ref 0–0.2)
BASOPHILS NFR BLD AUTO: 0.2 %
BUN SERPL-MCNC: 19 MG/DL (ref 7–30)
CALCIUM SERPL-MCNC: 8.9 MG/DL (ref 8.5–10.1)
CHLORIDE SERPL-SCNC: 105 MMOL/L (ref 94–109)
CO2 SERPL-SCNC: 29 MMOL/L (ref 20–32)
CREAT SERPL-MCNC: 1.05 MG/DL (ref 0.66–1.25)
DIFFERENTIAL METHOD BLD: ABNORMAL
EOSINOPHIL # BLD AUTO: 0 10E9/L (ref 0–0.7)
EOSINOPHIL NFR BLD AUTO: 0.2 %
ERYTHROCYTE [DISTWIDTH] IN BLOOD BY AUTOMATED COUNT: 13.3 % (ref 10–15)
FLUAV+FLUBV AG SPEC QL: NEGATIVE
FLUAV+FLUBV AG SPEC QL: NEGATIVE
GFR SERPL CREATININE-BSD FRML MDRD: 69 ML/MIN/1.7M2
GLUCOSE SERPL-MCNC: 134 MG/DL (ref 70–99)
HCT VFR BLD AUTO: 42.5 % (ref 40–53)
HGB BLD-MCNC: 13.6 G/DL (ref 13.3–17.7)
IMM GRANULOCYTES # BLD: 0.1 10E9/L (ref 0–0.4)
IMM GRANULOCYTES NFR BLD: 0.5 %
LACTATE BLD-SCNC: 0.7 MMOL/L (ref 0.7–2)
LYMPHOCYTES # BLD AUTO: 1.2 10E9/L (ref 0.8–5.3)
LYMPHOCYTES NFR BLD AUTO: 9.3 %
MCH RBC QN AUTO: 30.3 PG (ref 26.5–33)
MCHC RBC AUTO-ENTMCNC: 32 G/DL (ref 31.5–36.5)
MCV RBC AUTO: 95 FL (ref 78–100)
MONOCYTES # BLD AUTO: 1 10E9/L (ref 0–1.3)
MONOCYTES NFR BLD AUTO: 7.8 %
NEUTROPHILS # BLD AUTO: 10.8 10E9/L (ref 1.6–8.3)
NEUTROPHILS NFR BLD AUTO: 82 %
NRBC # BLD AUTO: 0 10*3/UL
NRBC BLD AUTO-RTO: 0 /100
PLATELET # BLD AUTO: 289 10E9/L (ref 150–450)
POTASSIUM SERPL-SCNC: 4 MMOL/L (ref 3.4–5.3)
RBC # BLD AUTO: 4.49 10E12/L (ref 4.4–5.9)
SODIUM SERPL-SCNC: 140 MMOL/L (ref 133–144)
SPECIMEN SOURCE: NORMAL
WBC # BLD AUTO: 13.2 10E9/L (ref 4–11)

## 2018-11-25 PROCEDURE — 87040 BLOOD CULTURE FOR BACTERIA: CPT | Performed by: EMERGENCY MEDICINE

## 2018-11-25 PROCEDURE — 36415 COLL VENOUS BLD VENIPUNCTURE: CPT | Performed by: INTERNAL MEDICINE

## 2018-11-25 PROCEDURE — 94640 AIRWAY INHALATION TREATMENT: CPT

## 2018-11-25 PROCEDURE — A9270 NON-COVERED ITEM OR SERVICE: HCPCS | Performed by: INTERNAL MEDICINE

## 2018-11-25 PROCEDURE — 99285 EMERGENCY DEPT VISIT HI MDM: CPT | Mod: 25

## 2018-11-25 PROCEDURE — 83605 ASSAY OF LACTIC ACID: CPT | Performed by: INTERNAL MEDICINE

## 2018-11-25 PROCEDURE — 12000007 ZZH R&B INTERMEDIATE

## 2018-11-25 PROCEDURE — 40000275 ZZH STATISTIC RCP TIME EA 10 MIN

## 2018-11-25 PROCEDURE — 93005 ELECTROCARDIOGRAM TRACING: CPT

## 2018-11-25 PROCEDURE — 96365 THER/PROPH/DIAG IV INF INIT: CPT

## 2018-11-25 PROCEDURE — 25000128 H RX IP 250 OP 636: Performed by: EMERGENCY MEDICINE

## 2018-11-25 PROCEDURE — 36415 COLL VENOUS BLD VENIPUNCTURE: CPT | Performed by: EMERGENCY MEDICINE

## 2018-11-25 PROCEDURE — 99223 1ST HOSP IP/OBS HIGH 75: CPT | Mod: AI | Performed by: INTERNAL MEDICINE

## 2018-11-25 PROCEDURE — 71046 X-RAY EXAM CHEST 2 VIEWS: CPT

## 2018-11-25 PROCEDURE — 85025 COMPLETE CBC W/AUTO DIFF WBC: CPT | Performed by: EMERGENCY MEDICINE

## 2018-11-25 PROCEDURE — 25000132 ZZH RX MED GY IP 250 OP 250 PS 637: Performed by: INTERNAL MEDICINE

## 2018-11-25 PROCEDURE — 25000125 ZZHC RX 250: Performed by: INTERNAL MEDICINE

## 2018-11-25 PROCEDURE — 40000274 ZZH STATISTIC RCP CONSULT EA 30 MIN

## 2018-11-25 PROCEDURE — 87804 INFLUENZA ASSAY W/OPTIC: CPT | Performed by: EMERGENCY MEDICINE

## 2018-11-25 PROCEDURE — 80048 BASIC METABOLIC PNL TOTAL CA: CPT | Performed by: EMERGENCY MEDICINE

## 2018-11-25 PROCEDURE — 25000128 H RX IP 250 OP 636: Performed by: INTERNAL MEDICINE

## 2018-11-25 PROCEDURE — 96375 TX/PRO/DX INJ NEW DRUG ADDON: CPT

## 2018-11-25 RX ORDER — ALBUTEROL SULFATE 0.83 MG/ML
2.5 SOLUTION RESPIRATORY (INHALATION)
Status: DISCONTINUED | OUTPATIENT
Start: 2018-11-25 | End: 2018-11-27 | Stop reason: HOSPADM

## 2018-11-25 RX ORDER — MULTIPLE VITAMINS W/ MINERALS TAB 9MG-400MCG
1 TAB ORAL DAILY
Status: DISCONTINUED | OUTPATIENT
Start: 2018-11-26 | End: 2018-11-27 | Stop reason: HOSPADM

## 2018-11-25 RX ORDER — LIDOCAINE 40 MG/G
CREAM TOPICAL
Status: DISCONTINUED | OUTPATIENT
Start: 2018-11-25 | End: 2018-11-27 | Stop reason: HOSPADM

## 2018-11-25 RX ORDER — CEFTRIAXONE 2 G/1
2 INJECTION, POWDER, FOR SOLUTION INTRAMUSCULAR; INTRAVENOUS ONCE
Status: COMPLETED | OUTPATIENT
Start: 2018-11-25 | End: 2018-11-25

## 2018-11-25 RX ORDER — ONDANSETRON 2 MG/ML
4 INJECTION INTRAMUSCULAR; INTRAVENOUS EVERY 6 HOURS PRN
Status: DISCONTINUED | OUTPATIENT
Start: 2018-11-25 | End: 2018-11-27 | Stop reason: HOSPADM

## 2018-11-25 RX ORDER — ONDANSETRON 4 MG/1
4 TABLET, ORALLY DISINTEGRATING ORAL EVERY 6 HOURS PRN
Status: DISCONTINUED | OUTPATIENT
Start: 2018-11-25 | End: 2018-11-27 | Stop reason: HOSPADM

## 2018-11-25 RX ORDER — IBUPROFEN 600 MG/1
600 TABLET, FILM COATED ORAL EVERY 6 HOURS PRN
Status: DISCONTINUED | OUTPATIENT
Start: 2018-11-25 | End: 2018-11-27 | Stop reason: HOSPADM

## 2018-11-25 RX ORDER — KETOROLAC TROMETHAMINE 15 MG/ML
10 INJECTION, SOLUTION INTRAMUSCULAR; INTRAVENOUS ONCE
Status: COMPLETED | OUTPATIENT
Start: 2018-11-25 | End: 2018-11-25

## 2018-11-25 RX ORDER — NALOXONE HYDROCHLORIDE 0.4 MG/ML
.1-.4 INJECTION, SOLUTION INTRAMUSCULAR; INTRAVENOUS; SUBCUTANEOUS
Status: DISCONTINUED | OUTPATIENT
Start: 2018-11-25 | End: 2018-11-27 | Stop reason: HOSPADM

## 2018-11-25 RX ORDER — SODIUM CHLORIDE 9 MG/ML
INJECTION, SOLUTION INTRAVENOUS CONTINUOUS
Status: DISCONTINUED | OUTPATIENT
Start: 2018-11-25 | End: 2018-11-26

## 2018-11-25 RX ORDER — AZITHROMYCIN 250 MG/1
250 TABLET, FILM COATED ORAL DAILY
Status: DISCONTINUED | OUTPATIENT
Start: 2018-11-26 | End: 2018-11-27 | Stop reason: HOSPADM

## 2018-11-25 RX ORDER — IPRATROPIUM BROMIDE AND ALBUTEROL SULFATE 2.5; .5 MG/3ML; MG/3ML
3 SOLUTION RESPIRATORY (INHALATION)
Status: DISCONTINUED | OUTPATIENT
Start: 2018-11-25 | End: 2018-11-27 | Stop reason: HOSPADM

## 2018-11-25 RX ORDER — CEFTRIAXONE 1 G/1
1 INJECTION, POWDER, FOR SOLUTION INTRAMUSCULAR; INTRAVENOUS EVERY 24 HOURS
Status: DISCONTINUED | OUTPATIENT
Start: 2018-11-26 | End: 2018-11-26

## 2018-11-25 RX ADMIN — FLUTICASONE FUROATE AND VILANTEROL TRIFENATATE 1 PUFF: 100; 25 POWDER RESPIRATORY (INHALATION) at 21:42

## 2018-11-25 RX ADMIN — CEFTRIAXONE SODIUM 2 G: 2 INJECTION, POWDER, FOR SOLUTION INTRAMUSCULAR; INTRAVENOUS at 15:06

## 2018-11-25 RX ADMIN — KETOROLAC TROMETHAMINE 10 MG: 15 INJECTION, SOLUTION INTRAMUSCULAR; INTRAVENOUS at 15:06

## 2018-11-25 RX ADMIN — IPRATROPIUM BROMIDE AND ALBUTEROL SULFATE 3 ML: .5; 3 SOLUTION RESPIRATORY (INHALATION) at 21:37

## 2018-11-25 RX ADMIN — SODIUM CHLORIDE: 9 INJECTION, SOLUTION INTRAVENOUS at 17:47

## 2018-11-25 RX ADMIN — ACETAMINOPHEN, ASPIRIN AND CAFFEINE 2 TABLET: 250; 250; 65 TABLET, FILM COATED ORAL at 22:51

## 2018-11-25 RX ADMIN — ENOXAPARIN SODIUM 40 MG: 40 INJECTION SUBCUTANEOUS at 19:31

## 2018-11-25 RX ADMIN — AZITHROMYCIN MONOHYDRATE 500 MG: 500 INJECTION, POWDER, LYOPHILIZED, FOR SOLUTION INTRAVENOUS at 15:46

## 2018-11-25 ASSESSMENT — ENCOUNTER SYMPTOMS
COUGH: 1
FEVER: 1
SHORTNESS OF BREATH: 1

## 2018-11-25 ASSESSMENT — ACTIVITIES OF DAILY LIVING (ADL): ADLS_ACUITY_SCORE: 11

## 2018-11-25 NOTE — PHARMACY-ADMISSION MEDICATION HISTORY
Admission medication history interview status for this patient is complete. See [facility] admission navigator for allergy information, prior to admission medications and immunization status.     Medication history interview source(s):Patient  Medication history resources (including written lists, pill bottles, clinic record):Care Everywhere  Primary pharmacy:[facility]    Changes made to PTA medication list:  Added: Excedrin  Deleted: Lovenox, Warfarin, Prednisone  Changed: None    Actions taken by pharmacist (provider contacted, etc):None     Additional medication history information:Patient was taken off warfarin about a week ago.    Medication reconciliation/reorder completed by provider prior to medication history? No    Do you take OTC medications (eg tylenol, ibuprofen, fish oil, eye/ear drops, etc)? Y(Y/N)    For patients on insulin therapy: N (Y/N)      Prior to Admission medications    Medication Sig Last Dose Taking? Auth Provider   acetaminophen-caffeine (EXCEDRIN TENSION HEADACHE) 500-65 MG TABS Take 2 tablets by mouth every 6 hours as needed for mild pain Past Week at Unknown time Yes Unknown, Entered By History   Cholecalciferol (VITAMIN D3) 2000 units TABS Take 2,000 Units by mouth every morning 11/25/2018 at AM Yes Unknown, Entered By History   Fluticasone-Salmeterol (AIRDUO RESPICLICK) 113-14 MCG/ACT AEPB inhaler Inhale 1 puff into the lungs 2 times daily 11/25/2018 at AM Yes Unknown, Entered By History   Ipratropium-Albuterol (COMBIVENT RESPIMAT)  MCG/ACT inhaler Inhale 1 puff into the lungs every 4 hours  11/25/2018 at 2PM Yes Reported, Patient   Multiple Vitamins-Minerals (CENTRUM SILVER) per tablet Take 1 tablet by mouth daily 11/25/2018 at AM Yes Reported, Patient

## 2018-11-25 NOTE — LETTER
Transition Communication Hand-off for Care Transitions to Next Level of Care Provider    Name: Mahad Ward  : 1945  MRN #: [id number]  Primary Care Provider: González Cornelius  Primary Care MD Name: González Cornelius  Primary Clinic: [facility] [place] [address]  [place] [address]  Primary Care Clinic Name: [facility]  Reason for Hospitalization:  Chronic obstructive pulmonary disease with acute lower respiratory infection (H) [J44.0]  Community acquired pneumonia of right lower lobe of lung (H) [J18.1]  Admit Date/Time: 2018  1:00 PM  Discharge Date:   Payor Source: Payor: [employer] / Plan: [employer] PLAN / Product Type: HMO /     Readmission Assessment Measure (SHERRELL) Risk Score/category: Elevated    Reason for Communication Hand-off Referral: Admission diagnoses: PN    Discharge Plan: home with spouse and antibiotics       Concern for non-adherence with plan of care:   no  Discharge Needs Assessment:  Needs       Most Recent Value    Transportation Available family or friend will provide, car    # of Referrals Placed by CTS Communication hand-offs to next level of Care Providers        Follow-up plan:  No future appointments.    Any outstanding tests or procedures:    none    Key Recommendations:  FYI of pt hospital admission:  Pt was admitted with sob/PNA sepsis. He was treated with IVF/zithromax/rocephin, nebs and steroids. He has improved and will be discharging back home with his wife and continued home O2, new scripts for zithromax and omnicef. No recommended labs for follow up. Pt will make his own appt recommended next week.          Hilaria Trimble RN CTS    AVS/Discharge Summary is the source of truth; this is a helpful guide for improved communication of patient story

## 2018-11-25 NOTE — ED PROVIDER NOTES
History     Chief Complaint:  Shortness of Breath and COPD    HPI   Mahad Ward is a 73 year old male with COPD and recent pneumonia who presents to the emergency department today for evaluation of shortness of breath. The patient has been having shortness of breath and cough for the past 4 days. He states that he has pain to his right lung when he coughs. This pain began in the lower right area, but seems to have moved upwards. He has had a fever between 101 and 103, but is afebrile here. He is on PRN O2 at home and has been using an inhaler. He got a flu shot and has no ill contacts. Of note, he was found to have a blood clot when he was admitted this past summer for pneumonia. He was taken off his blood thinner recently. Of note, the patient was admitted from 07/30/18 to 08/04/18 for respiratory failure secondary to COPD exacerbation and pneumonia, discharged on Augmentin.    Allergies:  Levaquin [Levofloxacin]    Medications:    Fluticasone-Salmeterol (AIRDUO RESPICLICK) 113-14 MCG/ACT AEPB inhaler  Ipratropium-Albuterol (COMBIVENT RESPIMAT)  MCG/ACT inhaler  Cholecalciferol (VITAMIN D3) 2000 units TABS  enoxaparin (LOVENOX) 80 MG/0.8ML injection  Multiple Vitamins-Minerals (CENTRUM SILVER) per tablet  predniSONE (DELTASONE) 20 MG tablet  warfarin (COUMADIN) 4 MG tablet    Past Medical History:    COPD    Past Surgical History:    History reviewed. No pertinent surgical history.    Family History:    History reviewed. No pertinent family history.    Social History:  The patient was accompanied to the ED by his wife.  Smoking Status: Former Smoker  Smokeless Tobacco: Never Used  Alcohol Use: Negative    Marital Status:       Review of Systems   Constitutional: Positive for fever.   Respiratory: Positive for cough and shortness of breath.    All other systems reviewed and are negative.    Physical Exam     Patient Vitals for the past 24 hrs:   BP Temp Temp src Heart Rate SpO2   11/25/18 1445  111/69 - - - 95 %   11/25/18 1430 108/65 - - - 96 %   11/25/18 1400 101/73 - - - 95 %   11/25/18 1345 114/69 - - - 96 %   11/25/18 1330 108/72 - - - 96 %   11/25/18 1316 - - - - 95 %   11/25/18 1315 108/73 - - - 96 %   11/25/18 1305 124/90 97.6  F (36.4  C) Oral 85 (!) 85 %      Physical Exam  General: Patient is alert and cooperative.  Exhaling through pursed lips.  HENT:  Normal nose, oropharynx. Moist oral mucosa.  Eyes: EOMI. Normal conjunctiva.  Neck:  Normal range of motion and appearance.   Cardiovascular:  Normal rate, regular rhythm and normal heart sounds.   Pulmonary/Chest:  Distant breath sounds, prolonged expiratory phase, no detectable wheezing.  Faint right basilar crackle.  Abdominal: Soft. No distension or tenderness.     Musculoskeletal: Normal range of motion. No edema or tenderness.   Neurological: oriented, normal strength, sensation, and coordination.   Skin: Warm and dry. No rash or bruising.   Psychiatric: Normal mood and affect. Normal behavior and judgement.      Emergency Department Course     ECG:  ECG taken at 1311, ECG read at 1335  Normal sinus rhythm with sinus arrhythmia  Rate 78 bpm. VT interval 152 ms. QRS duration 76 ms. QT/QTc 368/419 ms. P-R-T axes 80 61 71.    Imaging:  Radiology findings were communicated with the patient who voiced understanding of the findings.    XR Chest 2 Views  New patchy opacification at the left peripheral mid to low  lung that may represent airspace disease. New but small right pleural  effusion. Trace left pleural fluid is stable. Diffuse bilateral  interstitial prominence again identified and could represent chronic  underlying pulmonary disease. Stable cardiac silhouette.  Reading per radiology    Laboratory:  Laboratory findings were communicated with the patient who voiced understanding of the findings.    Blood culture: Pending  Blood culture: Pending  CBC: WBC 13.2, HGB 13.6,   BMP: Glucose 134, o/w WNL (Creatinine 1.05)  Influenza A/B  antigen: Negative    Interventions:  1506 Rocephin 2 g IV    Emergency Department Course:    1325 Nursing notes and vitals reviewed.    1330 I performed an exam of the patient as documented above.     1341 IV was inserted and blood was drawn for laboratory testing, results above.      1354 Influenza swab obtained. Results above.     1413 The patient was sent for a XR while in the emergency department, results above.      1430 I personally reviewed the lab and imaging results with the patient and answered all related questions prior to admission.    1448 I spoke with Dr. Jaffe of the Hospitalist service regarding patient's presentation, findings, and plan of care.     Impression & Plan      Medical Decision Making:  Mahad Ward is a 73 year old male who presents to the emergency department today for evaluation of a 3-4-day history of intermittent fevers, right-sided chest discomfort, and shortness of breath.  Does have a history of COPD for which she does use oxygen as needed at home.  Workup here has included a chest x-ray depicting a right lower lobe pneumonia.  Blood cultures were obtained and he was placed on the community-acquired pneumonia pathway.  Given his comorbidities prior history of hypoxic respiratory failure due to pneumonia earlier this year I believe admission is warranted for further management.  He is hemodynamically stable with normal oxygen saturations on supplemental oxygen by nasal cannula.  There is no concern for sepsis at this time and he is stable for admission to a medical bed.    Diagnosis:    ICD-10-CM    1. Community acquired pneumonia of right lower lobe of lung (H) J18.1 Influenza A/B antigen     Blood culture     Blood culture   2. Chronic obstructive pulmonary disease with acute lower respiratory infection (H) J44.0      Disposition:   Admission    Scribe Disclosure:  Harvey RONDON, am serving as a scribe at 1:26 PM on 11/25/2018 to document services personally performed  by Ramesh Green MD based on my observations and the provider's statements to me.     [facility] EMERGENCY DEPARTMENT       Ramesh Green MD  11/25/18 1682

## 2018-11-25 NOTE — PROGRESS NOTES
"Formerly [facility] RCAT     Date: 11/25/18  Admission Dx: RLL PNA  Pulmonary History: COPD  Home Nebulizer/MDI Use: Combivent MDI Q4   Home Oxygen: 2-3L prn  Acuity Level (RCAT flow sheet):  3  Aerosol Therapy initiated: Duoneb QID, Albuterol Q2 prn      Pulmonary Hygiene initiated: Deep breathe and cough- TID      Volume Expansion initiated: IS- per nursing      Current Oxygen Requirements: 3L nasal cannula  Current SpO2: 97%    Re-evaluation date: 11/28/18    Patient Education: Will explain Duoneb benefits to patient.      See \"RT Assessments\" flow sheet for patient assessment scoring and Acuity Level Details.             "

## 2018-11-25 NOTE — IP AVS SNAPSHOT
MRN:[id number]                      After Visit Summary   11/25/2018    Mahad Ward    MRN: [id number]           Thank you!     Thank you for choosing [facility] for your care. Our goal is always to provide you with excellent care. Hearing back from our patients is one way we can continue to improve our services. Please take a few minutes to complete the written survey that you may receive in the mail after you visit. If you would like to speak to someone directly about your visit please contact Patient Relations at [phone]. Thank you!          Patient Information     Date Of Birth          1945        Designated Caregiver       Most Recent Value    Caregiver    Will someone help with your care after discharge? no      About your hospital stay     You were admitted on:  November 25, 2018 You last received care in the:  [place] [address] [facility]    You were discharged on:  November 27, 2018       Who to Call     For medical emergencies, please call 911.  For non-urgent questions about your medical care, please call your primary care provider or clinic, [phone]          Attending Provider     Provider Specialty    Ramesh Green MD Emergency Medicine    David Jaffe MD Internal Medicine       Primary Care Provider Office Phone # Fax #    González Cornelius [phone]      After Care Instructions     Activity       Your activity upon discharge: activity as tolerated            Diet       Follow this diet upon discharge: Orders Placed This Encounter      Combination Diet Regular Diet Adult                  Follow-up Appointments     Follow-up and recommended labs and tests        Follow up with primary care provider, González Cornelius, within 3-5 days for hospital follow- up.  No follow up labs or test are needed.                  Pending Results     Date and Time Order Name Status Description    11/25/2018 1706 Sputum Culture Aerobic Bacterial  "Preliminary     11/25/2018 1433 Blood culture Preliminary     11/25/2018 1422 Blood culture Preliminary             Statement of Approval     Ordered          11/27/18 0920  I have reviewed and agree with all the recommendations and orders detailed in this document.  EFFECTIVE NOW     Approved and electronically signed by:  Lucy Young MD             Admission Information     Date & Time Provider Department Dept. Phone    11/25/2018 David Jaffe MD [place] [address] [facility] [phone]      Your Vitals Were     Blood Pressure Pulse Temperature Respirations Height Weight    103/54 (BP Location: Left arm) 78 95.7  F (35.4  C) (Oral) 20 1.803 m (5' 11\") 75.5 kg (166 lb 6.4 oz)    Pulse Oximetry BMI (Body Mass Index)                95% 23.21 kg/m2          Care EveryWhere ID     This is your Care EveryWhere ID. This could be used by other organizations to access your [facility] medical records  [id number]        Equal Access to Services     KAYLEEN FLORES AH: Hadii lesia Bruno, waaxda luqadaha, qaybta kaalmada adeegyada, amanda robbins . So [facility] [phone].    ATENCIÓN: Si habla español, tiene a herbert disposición servicios gratuitos de asistencia lingüística. Llame al [phone].    We comply with applicable federal civil rights laws and [place] laws. We do not discriminate on the basis of race, color, national origin, age, disability, sex, sexual orientation, or gender identity.               Review of your medicines      START taking        Dose / Directions    azithromycin 250 MG tablet   Commonly known as:  ZITHROMAX   Indication:  Community Acquired Pneumonia   Used for:  Community acquired pneumonia of right lower lobe of lung (H)        Dose:  250 mg   Start taking on:  11/28/2018   Take 1 tablet (250 mg) by mouth daily   Quantity:  2 tablet   Refills:  0       cefdinir 300 MG capsule   Commonly known as:  OMNICEF   Used for:  Community acquired " pneumonia of right lower lobe of lung (H)        Dose:  300 mg   Start taking on:  11/28/2018   Take 1 capsule (300 mg) by mouth 2 times daily for 4 days   Quantity:  8 capsule   Refills:  0         CONTINUE these medicines which have NOT CHANGED        Dose / Directions    acetaminophen-caffeine 500-65 MG Tabs   Commonly known as:  EXCEDRIN TENSION HEADACHE        Dose:  2 tablet   Take 2 tablets by mouth every 6 hours as needed for mild pain   Refills:  0       COMBIVENT RESPIMAT  MCG/ACT inhaler   Generic drug:  Ipratropium-Albuterol        Dose:  1 puff   Inhale 1 puff into the lungs every 4 hours   Refills:  0       fluticasone-salmeterol 113-14 MCG/ACT inhaler   Commonly known as:  AIRDUO RESPICLICK        Dose:  1 puff   Inhale 1 puff into the lungs 2 times daily   Refills:  0       multivitamin tablet        Dose:  1 tablet   Take 1 tablet by mouth daily   Refills:  0       Vitamin D3 2000 units Tabs        Dose:  2000 Units   Take 2,000 Units by mouth every morning   Refills:  0            Where to get your medicines      These medications were sent to [facility] [address] [address]     Phone:  [phone]     azithromycin 250 MG tablet    cefdinir 300 MG capsule                Protect others around you: Learn how to safely use, store and throw away your medicines at www.disposemymeds.org.        ANTIBIOTIC INSTRUCTION     You've Been Prescribed an Antibiotic - Now What?  Your healthcare team thinks that you or your loved one might have an infection. Some infections can be treated with antibiotics, which are powerful, life-saving drugs. Like all medications, antibiotics have side effects and should only be used when necessary. There are some important things you should know about your antibiotic treatment.      Your healthcare team may run tests before you start taking an antibiotic.    Your team may take samples  (e.g., from your blood, urine or other areas) to run tests to look for bacteria. These test can be important to determine if you need an antibiotic at all and, if you do, which antibiotic will work best.      Within a few days, your healthcare team might change or even stop your antibiotic.    Your team may start you on an antibiotic while they are working to find out what is making you sick.    Your team might change your antibiotic because test results show that a different antibiotic would be better to treat your infection.    In some cases, once your team has more information, they learn that you do not need an antibiotic at all. They may find out that you don't have an infection, or that the antibiotic you're taking won't work against your infection. For example, an infection caused by a virus can't be treated with antibiotics. Staying on an antibiotic when you don't need it is more likely to be harmful than helpful.      You may experience side effects from your antibiotic.    Like all medications, antibiotics have side effects. Some of these can be serious.    Let you healthcare team know if you have any known allergies when you are admitted to the hospital.    One significant side effect of nearly all antibiotics is the risk of severe and sometimes deadly diarrhea caused by Clostridium difficile (C. Difficile). This occurs when a person takes antibiotics because some good germs are destroyed. Antibiotic use allows C. diificile to take over, putting patients at high risk for this serious infection.    As a patient or caregiver, it is important to understand your or your loved one's antibiotic treatment. It is especially important for caregivers to speak up when patients can't speak for themselves. Here are some important questions to ask your healthcare team.    What infection is this antibiotic treating and how do you know I have that infection?    What side effects might occur from this antibiotic?    How  long will I need to take this antibiotic?    Is it safe to take this antibiotic with other medications or supplements (e.g., vitamins) that I am taking?     Are there any special directions I need to know about taking this antibiotic? For example, should I take it with food?    How will I be monitored to know whether my infection is responding to the antibiotic?    What tests may help to make sure the right antibiotic is prescribed for me?      Information provided by:  www.cdc.gov/getsmart  U.S. Department of Health and Human Services  Centers for disease Control and Prevention  National Center for Emerging and Zoonotic Infectious Diseases  Division of Healthcare Quality Promotion             Medication List: This is a list of all your medications and when to take them. Check marks below indicate your daily home schedule. Keep this list as a reference.      Medications           Morning Afternoon Evening Bedtime As Needed    acetaminophen-caffeine 500-65 MG Tabs   Commonly known as:  EXCEDRIN TENSION HEADACHE   Take 2 tablets by mouth every 6 hours as needed for mild pain                                   azithromycin 250 MG tablet   Commonly known as:  ZITHROMAX   Take 1 tablet (250 mg) by mouth daily   Start taking on:  11/28/2018   Last time this was given:  250 mg on 11/27/2018  8:31 AM                                   cefdinir 300 MG capsule   Commonly known as:  OMNICEF   Take 1 capsule (300 mg) by mouth 2 times daily for 4 days   Start taking on:  11/28/2018                                COMBIVENT RESPIMAT  MCG/ACT inhaler   Inhale 1 puff into the lungs every 4 hours   Generic drug:  Ipratropium-Albuterol                                fluticasone-salmeterol 113-14 MCG/ACT inhaler   Commonly known as:  AIRDUO RESPICLICK   Inhale 1 puff into the lungs 2 times daily                                multivitamin tablet   Take 1 tablet by mouth daily   Last time this was given:  1 tablet on 11/27/2018   8:31 AM                                   Vitamin D3 2000 units Tabs   Take 2,000 Units by mouth every morning   Last time this was given:  2,000 Units on 11/27/2018  8:31 AM

## 2018-11-25 NOTE — ED TRIAGE NOTES
Pt presents with hx of COPD and increased SOB for the past few days. Pt reports productive cough with green sputum. Hx of pneumonia 2-3 months ago, increased right lung pain. Pt A&O x4.

## 2018-11-25 NOTE — IP AVS SNAPSHOT
[place] [address] [facility]    [address]    [facility] [address]    Phone:  [phone]    Fax:  [phone]                                       After Visit Summary   11/25/2018    Mahad Ward    MRN: [id number]           After Visit Summary Signature Page     I have received my discharge instructions, and my questions have been answered. I have discussed any challenges I see with this plan with the nurse or doctor.    ..........................................................................................................................................  Patient/Patient Representative Signature      ..........................................................................................................................................  Patient Representative Print Name and Relationship to Patient    ..................................................               ................................................  Date                                   Time    ..........................................................................................................................................  Reviewed by Signature/Title    ...................................................              ..............................................  Date                                               Time          22EPIC Rev 08/18

## 2018-11-25 NOTE — ED NOTES
[facility]  ED Nurse Handoff Report    Mahad Ward is a 73 year old male   ED Chief complaint: Shortness of Breath and COPD  . ED Diagnosis:   Final diagnoses:   Community acquired pneumonia of right lower lobe of lung (H)   Chronic obstructive pulmonary disease with acute lower respiratory infection (H)     Allergies:   Allergies   Allergen Reactions     Levaquin [Levofloxacin]        Code Status: Full Code  Activity level - Baseline/Home:  Stand with Assist. Activity Level - Current:   Stand with Assist. Lift room needed: No. Bariatric: No   Needed: No   Isolation: No. Infection: Not Applicable.     Vital Signs:   Vitals:    11/25/18 1345 11/25/18 1400 11/25/18 1430 11/25/18 1445   BP: 114/69 101/73 108/65 111/69   Temp:       TempSrc:       SpO2: 96% 95% 96% 95%       Cardiac Rhythm:  ,      Pain level:    Patient confused: No. Patient Falls Risk: Yes.   Elimination Status: Has voided   Patient Report - Initial Complaint: increased sob, copd. Focused Assessment: Diminished lung sounds LLL, pt is on 2-3L home O2 PRN.   Tests Performed:   Labs Ordered and Resulted from Time of ED Arrival Up to the Time of Departure from the ED   CBC WITH PLATELETS DIFFERENTIAL - Abnormal; Notable for the following:        Result Value    WBC 13.2 (*)     Absolute Neutrophil 10.8 (*)     All other components within normal limits   BASIC METABOLIC PANEL - Abnormal; Notable for the following:     Glucose 134 (*)     All other components within normal limits   PERIPHERAL IV CATHETER   INFLUENZA A/B ANTIGEN   BLOOD CULTURE   BLOOD CULTURE     XR Chest 2 Views   Preliminary Result   IMPRESSION: New patchy opacification at the left peripheral mid to low   lung that may represent airspace disease. New but small right pleural   effusion. Trace left pleural fluid is stable. Diffuse bilateral   interstitial prominence again identified and could represent chronic   underlying pulmonary disease. Stable cardiac  silhouette.        Treatments provided: see MAR  Family Comments: Wife at bedside  OBS brochure/video discussed/provided to patient:  Yes  ED Medications:   Medications   lidocaine 1 % 1 mL (not administered)   lidocaine (LMX4) cream (not administered)   sodium chloride (PF) 0.9% PF flush 3 mL (not administered)   sodium chloride (PF) 0.9% PF flush 3 mL (not administered)   cefTRIAXone (ROCEPHIN) 2 g vial to attach to  ml bag for ADULTS or NS 50 ml bag for PEDS (2 g Intravenous New Bag 11/25/18 1506)   azithromycin (ZITHROMAX) 500 mg in sodium chloride 0.9 % 250 mL intermittent infusion (not administered)   ketorolac (TORADOL) injection 10 mg (10 mg Intravenous Given 11/25/18 1506)     Drips infusing:  Yes  For the majority of the shift, the patient's behavior Green. Interventions performed were none.     Severe Sepsis OR Septic Shock Diagnosis Present: No      ED Nurse Name/Phone Number: Kailey Wright,   3:12 PM    RECEIVING UNIT ED HANDOFF REVIEW    Above ED Nurse Handoff Report was reviewed: Yes  Reviewed by: Jose Dawson on November 25, 2018 at 3:45 PM

## 2018-11-26 LAB
ALBUMIN SERPL-MCNC: 2.3 G/DL (ref 3.4–5)
ALP SERPL-CCNC: 72 U/L (ref 40–150)
ALT SERPL W P-5'-P-CCNC: 20 U/L (ref 0–70)
ANION GAP SERPL CALCULATED.3IONS-SCNC: 7 MMOL/L (ref 3–14)
AST SERPL W P-5'-P-CCNC: 18 U/L (ref 0–45)
BILIRUB SERPL-MCNC: 0.5 MG/DL (ref 0.2–1.3)
BUN SERPL-MCNC: 16 MG/DL (ref 7–30)
CALCIUM SERPL-MCNC: 8.5 MG/DL (ref 8.5–10.1)
CHLORIDE SERPL-SCNC: 109 MMOL/L (ref 94–109)
CO2 SERPL-SCNC: 25 MMOL/L (ref 20–32)
CREAT SERPL-MCNC: 0.77 MG/DL (ref 0.66–1.25)
ERYTHROCYTE [DISTWIDTH] IN BLOOD BY AUTOMATED COUNT: 13.4 % (ref 10–15)
GFR SERPL CREATININE-BSD FRML MDRD: >90 ML/MIN/1.7M2
GLUCOSE SERPL-MCNC: 88 MG/DL (ref 70–99)
GRAM STN SPEC: NORMAL
HCT VFR BLD AUTO: 38.8 % (ref 40–53)
HGB BLD-MCNC: 12.2 G/DL (ref 13.3–17.7)
INTERPRETATION ECG - MUSE: NORMAL
Lab: NORMAL
MCH RBC QN AUTO: 30.3 PG (ref 26.5–33)
MCHC RBC AUTO-ENTMCNC: 31.4 G/DL (ref 31.5–36.5)
MCV RBC AUTO: 96 FL (ref 78–100)
PLATELET # BLD AUTO: 268 10E9/L (ref 150–450)
POTASSIUM SERPL-SCNC: 3.7 MMOL/L (ref 3.4–5.3)
PROT SERPL-MCNC: 6.7 G/DL (ref 6.8–8.8)
RBC # BLD AUTO: 4.03 10E12/L (ref 4.4–5.9)
SODIUM SERPL-SCNC: 141 MMOL/L (ref 133–144)
SPECIMEN SOURCE: NORMAL
WBC # BLD AUTO: 10.7 10E9/L (ref 4–11)

## 2018-11-26 PROCEDURE — 40000275 ZZH STATISTIC RCP TIME EA 10 MIN

## 2018-11-26 PROCEDURE — A9270 NON-COVERED ITEM OR SERVICE: HCPCS | Performed by: INTERNAL MEDICINE

## 2018-11-26 PROCEDURE — 25000128 H RX IP 250 OP 636: Performed by: INTERNAL MEDICINE

## 2018-11-26 PROCEDURE — 25000125 ZZHC RX 250: Performed by: INTERNAL MEDICINE

## 2018-11-26 PROCEDURE — 87205 SMEAR GRAM STAIN: CPT | Performed by: INTERNAL MEDICINE

## 2018-11-26 PROCEDURE — 94640 AIRWAY INHALATION TREATMENT: CPT

## 2018-11-26 PROCEDURE — 25000132 ZZH RX MED GY IP 250 OP 250 PS 637: Performed by: INTERNAL MEDICINE

## 2018-11-26 PROCEDURE — 94640 AIRWAY INHALATION TREATMENT: CPT | Mod: 76

## 2018-11-26 PROCEDURE — 87070 CULTURE OTHR SPECIMN AEROBIC: CPT | Performed by: INTERNAL MEDICINE

## 2018-11-26 PROCEDURE — 36415 COLL VENOUS BLD VENIPUNCTURE: CPT | Performed by: INTERNAL MEDICINE

## 2018-11-26 PROCEDURE — 80053 COMPREHEN METABOLIC PANEL: CPT | Performed by: INTERNAL MEDICINE

## 2018-11-26 PROCEDURE — 99232 SBSQ HOSP IP/OBS MODERATE 35: CPT | Performed by: INTERNAL MEDICINE

## 2018-11-26 PROCEDURE — 12000007 ZZH R&B INTERMEDIATE

## 2018-11-26 PROCEDURE — 85027 COMPLETE CBC AUTOMATED: CPT | Performed by: INTERNAL MEDICINE

## 2018-11-26 RX ORDER — CEFTRIAXONE 2 G/1
2 INJECTION, POWDER, FOR SOLUTION INTRAMUSCULAR; INTRAVENOUS EVERY 24 HOURS
Status: DISCONTINUED | OUTPATIENT
Start: 2018-11-26 | End: 2018-11-27

## 2018-11-26 RX ORDER — ACETAMINOPHEN 650 MG/1
650 SUPPOSITORY RECTAL EVERY 4 HOURS PRN
Status: DISCONTINUED | OUTPATIENT
Start: 2018-11-26 | End: 2018-11-27 | Stop reason: HOSPADM

## 2018-11-26 RX ORDER — ACETAMINOPHEN 325 MG/1
650 TABLET ORAL EVERY 4 HOURS PRN
Status: DISCONTINUED | OUTPATIENT
Start: 2018-11-26 | End: 2018-11-27 | Stop reason: HOSPADM

## 2018-11-26 RX ADMIN — MULTIPLE VITAMINS W/ MINERALS TAB 1 TABLET: TAB at 09:48

## 2018-11-26 RX ADMIN — SODIUM CHLORIDE: 9 INJECTION, SOLUTION INTRAVENOUS at 03:52

## 2018-11-26 RX ADMIN — AZITHROMYCIN MONOHYDRATE 250 MG: 250 TABLET ORAL at 09:48

## 2018-11-26 RX ADMIN — ENOXAPARIN SODIUM 40 MG: 40 INJECTION SUBCUTANEOUS at 19:30

## 2018-11-26 RX ADMIN — FLUTICASONE FUROATE AND VILANTEROL TRIFENATATE 1 PUFF: 100; 25 POWDER RESPIRATORY (INHALATION) at 20:24

## 2018-11-26 RX ADMIN — ACETAMINOPHEN, ASPIRIN AND CAFFEINE 2 TABLET: 250; 250; 65 TABLET, FILM COATED ORAL at 09:50

## 2018-11-26 RX ADMIN — CEFTRIAXONE SODIUM 2 G: 2 INJECTION, POWDER, FOR SOLUTION INTRAMUSCULAR; INTRAVENOUS at 15:02

## 2018-11-26 RX ADMIN — IPRATROPIUM BROMIDE AND ALBUTEROL SULFATE 3 ML: .5; 3 SOLUTION RESPIRATORY (INHALATION) at 16:02

## 2018-11-26 RX ADMIN — IPRATROPIUM BROMIDE AND ALBUTEROL SULFATE 3 ML: .5; 3 SOLUTION RESPIRATORY (INHALATION) at 07:28

## 2018-11-26 RX ADMIN — VITAMIN D, TAB 1000IU (100/BT) 2000 UNITS: 25 TAB at 09:47

## 2018-11-26 RX ADMIN — IPRATROPIUM BROMIDE AND ALBUTEROL SULFATE 3 ML: .5; 3 SOLUTION RESPIRATORY (INHALATION) at 12:20

## 2018-11-26 RX ADMIN — IPRATROPIUM BROMIDE AND ALBUTEROL SULFATE 3 ML: .5; 3 SOLUTION RESPIRATORY (INHALATION) at 20:24

## 2018-11-26 RX ADMIN — ACETAMINOPHEN, ASPIRIN AND CAFFEINE 2 TABLET: 250; 250; 65 TABLET, FILM COATED ORAL at 19:32

## 2018-11-26 ASSESSMENT — ACTIVITIES OF DAILY LIVING (ADL)
ADLS_ACUITY_SCORE: 11

## 2018-11-26 NOTE — PLAN OF CARE
"Problem: Patient Care Overview  Goal: Plan of Care/Patient Progress Review  Outcome: Improving  Pt admitted today 11/25/18 arrived on unit about 1610. Awake, A/O x4 cooperative wife at bedside. Ambulated in room. VSS. LS diminished wit expiratory wheezes and Crackle coarse. On 2L NC O2 Sat 97%. Dyspneic on exertion. IVF infusing. On IV Abx rocephin and Azithromycin. subcutaneous Lovenox. Non productive cough at times. IS use deep breath and cough encouraged. No c/o chest pain or difficult breathing. Plan change Azithromycin to PO tomorrow pending blood culture. Will monitor and continue POC.     Addendum:  Triggered sepsis, /50 (BP Location: Left arm)  Temp 101.5  F (38.6  C) (Oral)  Resp 20  Ht 1.803 m (5' 11\")  Wt 75.5 kg (166 lb 6.4 oz)  SpO2 93%  BMI 23.21 kg/m2 lactic acid value 0.7. Report to incoming RN.     "

## 2018-11-26 NOTE — PROGRESS NOTES
[facility]    Hospitalist Progress Note      Assessment & Plan   Mahad Ward is a 73 year old male with a history of COPD, recent hospitalization with pneumonia in July,benign prostatic hypertrophy and DVT, presents with increased shortness of breath, productive cough, fever and right lower lobe infiltrate    Community acquired pneumonia, right lower lobe with associated sepsis  Presented with cough, fever, leukocytosis and XRAY finding suggestive of pneumonia. Influenza swab is negative.  - continue with Ceftiraxone--increase to 2g daily and Azithromycin  - blood culture is negative to date  - monitor fever curve  - wbc has normalized  - he uses oxygen at home intermittently due to his COPD upto 4L/min    Hx of COPD  - continue home inhaler and duoneb  - diminished air movement, but no wheezing on exam  - hold off steroids at this time.    Hx of Provoked DVT  - has completed a course of coumadin therapy    BPH with hx of TURP  - no acute issues currently  DVT Prophylaxis: Enoxaparin (Lovenox) SQ  Code Status: Full Code    Disposition: Expected discharge in 1-2 days if continued improvement in breathing and afebrile.      Lucy Young MD  Text Page  (7am to 6pm)    Interval History   Feels breathing is much better. tmax 102.4 overnight.   No significant cough this morning.  Feels overall improved.     -Data reviewed today: I reviewed all new labs and imaging results over the last 24 hours. I personally reviewed the chest x-ray image(s) showing right lower lobe infiltrate.    Physical Exam   Temp: 96.7  F (35.9  C) Temp src: Oral BP: 98/55   Heart Rate: 76 Resp: 16 SpO2: 94 % O2 Device: Nasal cannula Oxygen Delivery: 3 LPM  Vitals:    11/25/18 1613   Weight: 75.5 kg (166 lb 6.4 oz)     Vital Signs with Ranges  Temp:  [96.7  F (35.9  C)-102.4  F (39.1  C)] 96.7  F (35.9  C)  Heart Rate:  [72-88] 76  Resp:  [16-20] 16  BP: ()/(49-90) 98/55  SpO2:  [85 %-97 %] 94 %  I/O last 3 completed  shifts:  In: 1240 [P.O.:440; I.V.:800]  Out: -     Constitutional: Alert awake and no apparent distress  Respiratory: Diminished air movement, right lower lung crackles, otherwise CTA  Cardiovascular: regular rate and rhythm  GI: soft and non-tender  Skin/Integumen: warm and dry  Other:      Medications       azithromycin  250 mg Oral Daily     cefTRIAXone  1 g Intravenous Q24H     cholecalciferol  2,000 Units Oral QAM     enoxaparin  40 mg Subcutaneous Q24H     fluticasone-vilanterol  1 puff Inhalation Daily     ipratropium - albuterol 0.5 mg/2.5 mg/3 mL  3 mL Nebulization 4x daily     multivitamin, therapeutic with minerals  1 tablet Oral Daily     sodium chloride (PF)  3 mL Intracatheter Q8H       Data     Recent Labs  Lab 11/26/18  0742 11/25/18  1312   WBC 10.7 13.2*   HGB 12.2* 13.6   MCV 96 95    289    140   POTASSIUM 3.7 4.0   CHLORIDE 109 105   CO2 25 29   BUN 16 19   CR 0.77 1.05   ANIONGAP 7 6   KATELYNN 8.5 8.9   GLC 88 134*   ALBUMIN 2.3*  --    PROTTOTAL 6.7*  --    BILITOTAL 0.5  --    ALKPHOS 72  --    ALT 20  --    AST 18  --        Recent Results (from the past 24 hour(s))   XR Chest 2 Views    Narrative    CHEST TWO VIEWS    11/25/2018 2:19 PM     HISTORY: Shortness of breath. Fever.     COMPARISON: 7/30/2018.      Impression    IMPRESSION: New patchy opacification at the left peripheral mid to low  lung that may represent airspace disease. New but small right pleural  effusion. Trace left pleural fluid is stable. Diffuse bilateral  interstitial prominence again identified and could represent chronic  underlying pulmonary disease. Stable cardiac silhouette.    CRISTIANA MEDINA MD

## 2018-11-26 NOTE — CONSULTS
Consult Date:  11/25/2018      DATE OF ADMISSION:  11/25/2018.      CHIEF COMPLAINT:  Shortness of breath.      HISTORY OF PRESENT ILLNESS:  This is a 73-year-old patient with a history of COPD, benign prostatic hypertrophy, DVT who presents with increased shortness of breath for the past 5 days.  The patient had intermittent fever up to 100.5.  He was taking Excedrin as needed for headaches and for fevers.  He noted increased shortness of breath, increased cough with colored phlegm, generalized weakness and poor appetite.  He had developed pain in the right upper abdominal quadrant that migrated to the top of the chest, worse with deep breathing, sharp in character, for which symptoms he came to the emergency room.  Here evaluation with chest x-ray showed infiltrate in the left peripheral lung, small right pleural effusion, trace left pleural fluid, diffuse bilateral interstitial prominence.  The patient was started on antibiotic treatment with IV ceftriaxone and Zithromax after obtaining blood cultures and he is being admitted for hospital treatment for community-acquired pneumonia.  He did have a similar presentation with the need for hospitalization in July of this year.  At home he is on oxygen treatment as needed.  He is not on chronic steroid treatment.  The patient has not been on a ventilator for respiratory failure related to his COPD.  He reports being up-to-date on his immunizations including influenza, pneumonia and pertussis.      PAST MEDICAL HISTORY:  Includes COPD, benign prostatic hyperplasia, DVT.  He has finished Coumadin treatment and recently this has been stopped.  This was assessed as provoked DVT.      PAST SURGICAL HISTORY:  TURP for prostate enlargement and appendectomy.      MEDICATIONS ON ADMISSION:  Excedrin 2 tablets every 6 hours as needed for headache, vitamin D 2000 units daily, AirDuo 1 puff 2 times daily, Combivent 1 puff every 4 hours as needed, multivitamins 1 daily.       ALLERGIES:  LEVAQUIN.      SOCIAL HISTORY:  He is  with 2 children.  He has quit smoking tobacco several years ago.  Does not drink alcohol.  He has been physically active.      FAMILY HISTORY:  Includes COPD in both of his parents.  His father had liver cancer and esophageal cancer.      PHYSICAL EXAMINATION:   GENERAL:  A 73-year-old patient in mild respiratory distress.  He uses oxygen.  Mild wheezes.   VITAL SIGNS:  Blood pressure 100/70, temperature 97.6, pulse 85, oxygen saturation 85% on room air and up to 96 with oxygen treatment.   HEENT:  Pupils equal, react to light.  No icterus of the sclerae.  Extraocular muscle movements normal.  Oral mucosa clean and dry.   NECK:  Supple.  No jugular venous distention, lymphadenopathy or thyromegaly.   CHEST:  Diminished breath sounds bilaterally, scattered rhonchi and mild crackles in the base of the lungs.  Fine expiratory wheezes.   CARDIOVASCULAR:  Regular rate and rhythm.  No significant murmur.   ABDOMEN:  Soft, nondistended, present bowel sounds.  No organomegaly.   EXTREMITIES:  No edema.  Left lower extremity trace edema with some purplish discoloration, reported chronic by the patient.   NEUROLOGIC:  Grossly normal without focal deficits.      LABORATORY DATA:  CBC showed a white blood cell count of 13.2, normal red cells and platelets.  Normal metabolic panel, liver and kidney tests.  Influenza negative.  Blood cultures taken and pending at this time.      IMAGING STUDIES:  Chest x-ray:  New patchy opacification at the left peripheral mid to lower lung, new but small right pleural effusion, trace left pleural fluid, diffuse bilateral interstitial prominence suggestive of chronic underlying pulmonary disease.  EKG shows sinus rhythm, no specific ST changes, no ischemia.      ASSESSMENT AND PLAN:   1.  A 73-year-old patient with history of COPD, recent hospitalization with pneumonia in July.  History of benign prostatic hypertrophy and DVT, presents  with increased shortness of breath, productive cough, fever and left new lung infiltrate and small right pleural effusion.  The patient with clinical and radiographic evidence of community-acquired pneumonia will be admitted to hospital treatment, started on IV ceftriaxone and Zithromax, which will be continued, changed to oral Zithromax tomorrow pending blood cultures.  We will obtain sputum culture.  Supportive treatment with oxygen, DuoNebs, inhaled steroids, Mucinex, incentive spirometry, ambulate.  Follow up mild leukocytosis in a.m.   2.  History of chronic obstructive pulmonary disease.  Continue above measures with his outpatient inhalers.  Clinically, no significant wheezing.  Will hold on systemic steroids at this point.  Reassess in a.m.   3.  History of benign prostatic hypertrophy, post-TURP in the past, asymptomatic.   4.  History of DVT, provoked and finished 4 months of Coumadin treatment.  We will start DVT prophylaxis with subcutaneous Lovenox.        The patient will be admitted to inpatient.  Expected length of stay 2 days.  He is full code status.      CONDITION:  Currently stable.         HIMANSHU LUCAS MD             D: 2018   T: 2018   MT: BENSON      Name:     GREGOR COHEN   MRN:      [id number]        Account:       [id number]   :      1945           Consult Date:  2018      Document: [id number]

## 2018-11-26 NOTE — PLAN OF CARE
Problem: Patient Care Overview  Goal: Plan of Care/Patient Progress Review  Outcome: No Change  Pt had fever 101.5, trended down to 99.1 and last one was 98.5.  A&O.  VERA.  3L O2 93%. O2 at home is as needed.   Incentive spirometer encouraged.  Need sputum sample, pt was not able to provide on this shift.  Plan to change ot azithromycin PO pending blood cultures.

## 2018-11-26 NOTE — PLAN OF CARE
Problem: Patient Care Overview  Goal: Plan of Care/Patient Progress Review  Outcome: No Change  A&Ox4. Moves independently in the room. VSS ex soft bp's. Abx - Zithro and Rocephin. Denies pain. Nasal cannula 3 L - was able to remove O2 for two hours. Sputum sample collected. Possible discharge in 1-2 days.

## 2018-11-27 VITALS
SYSTOLIC BLOOD PRESSURE: 103 MMHG | HEIGHT: 71 IN | RESPIRATION RATE: 20 BRPM | WEIGHT: 166.4 LBS | DIASTOLIC BLOOD PRESSURE: 54 MMHG | TEMPERATURE: 95.7 F | OXYGEN SATURATION: 95 % | HEART RATE: 78 BPM | BODY MASS INDEX: 23.3 KG/M2

## 2018-11-27 PROCEDURE — 25000128 H RX IP 250 OP 636: Performed by: INTERNAL MEDICINE

## 2018-11-27 PROCEDURE — 25000125 ZZHC RX 250: Performed by: INTERNAL MEDICINE

## 2018-11-27 PROCEDURE — 40000275 ZZH STATISTIC RCP TIME EA 10 MIN

## 2018-11-27 PROCEDURE — A9270 NON-COVERED ITEM OR SERVICE: HCPCS | Performed by: INTERNAL MEDICINE

## 2018-11-27 PROCEDURE — 94640 AIRWAY INHALATION TREATMENT: CPT

## 2018-11-27 PROCEDURE — 94640 AIRWAY INHALATION TREATMENT: CPT | Mod: 76

## 2018-11-27 PROCEDURE — 99239 HOSP IP/OBS DSCHRG MGMT >30: CPT | Performed by: INTERNAL MEDICINE

## 2018-11-27 PROCEDURE — 25000132 ZZH RX MED GY IP 250 OP 250 PS 637: Performed by: INTERNAL MEDICINE

## 2018-11-27 RX ORDER — CEFTRIAXONE 2 G/1
2 INJECTION, POWDER, FOR SOLUTION INTRAMUSCULAR; INTRAVENOUS EVERY 24 HOURS
Status: DISCONTINUED | OUTPATIENT
Start: 2018-11-27 | End: 2018-11-27 | Stop reason: HOSPADM

## 2018-11-27 RX ORDER — AZITHROMYCIN 250 MG/1
250 TABLET, FILM COATED ORAL DAILY
Qty: 2 TABLET | Refills: 0 | Status: ON HOLD | OUTPATIENT
Start: 2018-11-28 | End: 2024-02-07

## 2018-11-27 RX ORDER — CEFDINIR 300 MG/1
300 CAPSULE ORAL 2 TIMES DAILY
Qty: 8 CAPSULE | Refills: 0 | Status: SHIPPED | OUTPATIENT
Start: 2018-11-28 | End: 2018-12-02

## 2018-11-27 RX ADMIN — VITAMIN D, TAB 1000IU (100/BT) 2000 UNITS: 25 TAB at 08:31

## 2018-11-27 RX ADMIN — AZITHROMYCIN MONOHYDRATE 250 MG: 250 TABLET ORAL at 08:31

## 2018-11-27 RX ADMIN — CEFTRIAXONE SODIUM 2 G: 2 INJECTION, POWDER, FOR SOLUTION INTRAMUSCULAR; INTRAVENOUS at 11:06

## 2018-11-27 RX ADMIN — IPRATROPIUM BROMIDE AND ALBUTEROL SULFATE 3 ML: .5; 3 SOLUTION RESPIRATORY (INHALATION) at 07:42

## 2018-11-27 RX ADMIN — IPRATROPIUM BROMIDE AND ALBUTEROL SULFATE 3 ML: .5; 3 SOLUTION RESPIRATORY (INHALATION) at 11:22

## 2018-11-27 RX ADMIN — MULTIPLE VITAMINS W/ MINERALS TAB 1 TABLET: TAB at 08:31

## 2018-11-27 RX ADMIN — ACETAMINOPHEN, ASPIRIN AND CAFFEINE 2 TABLET: 250; 250; 65 TABLET, FILM COATED ORAL at 03:51

## 2018-11-27 ASSESSMENT — ACTIVITIES OF DAILY LIVING (ADL)
ADLS_ACUITY_SCORE: 9
ADLS_ACUITY_SCORE: 11

## 2018-11-27 NOTE — CONSULTS
Care Transition Initial Assessment - RN    Reason For Consult: care coordination/care conference, discharge planning   Met with: Patient.  DATA   Active Problems:    RLL pneumonia (H)       Cognitive Status: alert and oriented.  [facility] Name: [facility]  Primary Care MD Name: González Cornelius  Contact information and PCP information verified: Yes  Lives With: spouse  Living Arrangements: house  Quality Of Family Relationships: supportive, helpful, involved  Description of Support System: Supportive, Involved   Who is your support system?: Wife   Support Assessment: Adequate family and caregiver support   Insurance concerns: No Insurance issues identified  ASSESSMENT  Patient currently receives the following services:  Home oxygen through [facility] as needed up to 4L. No other services        Identified issues/concerns regarding health management: none    Met with pt at bedside for PNA dx and Elevated SHERRELL. Pt has had a couple admissions this year and has a hx of COPD. He was admitted with sob/PNA sepsis. He states he is feeling much better and has orders to discharge home today. He states he will be going back home with his wife. She will pick him up today. He states his plan is to go home and relax, stay on his oxygen and finish his antibiotics as recommended. We discussed the PNA action plan and risk for readmission.    PLAN  Patient/family is agreeable to the plan?  Yes  Patient anticipates discharging to home with wife .        Patient anticipates needs for home equipment: No  Plan/Disposition: Home   Appointments: Pt will make his own appt. Prefers to make it so his wife can attend with him and she will be gone for a  at the end of this week. Encouraged pt to follow up next week.    Care  (CTS) will continue to follow as needed. Handoff will be sent to cts for pcp on discharge. No needs identified.    Hilaria Trimble RN CTS  Care Transitions  [phone]

## 2018-11-27 NOTE — PLAN OF CARE
Problem: Patient Care Overview  Goal: Plan of Care/Patient Progress Review  Outcome: Therapy, progress toward functional goals as expected  Orientation: Alert and oriented x4  VSS. 95% on 2L NC.   . HR 53.   LS: clear, diminished w/ crackles in RML and RLL.   GI:  Passing gas. No BM. Denies N/V.   : Adequate urine output.   Skin: clean and dry with some bruising and discoloration on LLE.  Activity: Independent. . Pt ambulated well throughout unit on 2-3 L NC   Pain: 0/10. Denies  Plan: Continue with current cares. Pt came in with PNA in RLL. Hx of COPD, PNA, R lung clot, DVT and BPH. Sputum has gram neg and Pos rohini. Active bowels. Afebrile. On IV rocephin. Discharging home today with wife.

## 2018-11-27 NOTE — PLAN OF CARE
Problem: Patient Care Overview  Goal: Plan of Care/Patient Progress Review  Outcome: Improving  Pt up independently in room. A/O cooperative. VSS afebrile. LS diminished crackle coarse in right side with some expiratory wheezes. On 2L NC O2 Sat 96%. Dyspneic on exertion. PIV SL. On IV Abx rocephin and PO Azithromycin. On Lovenox subcutaneous. Deep breath and cough encouraged, used IS with encouragement. C/O headache Excedrin given per pt request. No C/o chest pain or difficulty breathing. Will monitor and continue POC.

## 2018-11-27 NOTE — DISCHARGE SUMMARY
[facility]    Discharge Summary  Hospitalist    Date of Admission:  11/25/2018  Date of Discharge:  11/27/2018  Discharging Provider: Lucy Young MD  Date of Service (when I saw the patient): 11/27/18    Discharge Diagnoses   Community acquired pneumonia, right lower lobe  Hx of COPD  Hx of provoked DVT  BPH with hx of TURP    History of Present Illness   Mahad Ward is a 73 year old male with a history of COPD, recent hospitalization with pneumonia in July,benign prostatic hypertrophy and DVT, presents with increased shortness of breath, productive cough, fever and right lower lobe infiltrate. See H & P for detail.     Hospital Course   Mahad Ward was admitted on 11/25/2018.  The following problems were addressed during his hospitalization:    Community acquired pneumonia, right lower lobe  Presented with cough, fever, leukocytosis and XRAY finding suggestive of pneumonia. Influenza swab is negative. Treated with ceftriaxone and Azithromycin. He overall feels much improved.  He is ambulating in his room and did well. He is afebrile for >24 hrs.  Blood cultures remain no growth to date. He will discharge home on 2 more days of Azithromycin and 4 more days of Omnicef to complete course. F/u with PCP in 3-5 days  - he uses oxygen at home intermittently due to his COPD upto 4L/min and currently using 2L on and off in the hospital     Hx of COPD  - continue home inhaler and duoneb  - does not appear he is in exacerbation his hospital stay and did not receive steroids     Hx of Provoked DVT  - has completed a course of coumadin therapy     BPH with hx of TURP  - no acute issues currently    Lucy Young MD    Significant Results and Procedures   See imaging and labs in epic    Pending Results     Unresulted Labs Ordered in the Past 30 Days of this Admission     Date and Time Order Name Status Description    11/25/2018 1706 Sputum Culture Aerobic Bacterial In process     11/25/2018  1433 Blood culture Preliminary     11/25/2018 1422 Blood culture Preliminary           Code Status   Full Code       Primary Care Physician   González Cornelius    Physical Exam   Temp: 95.7  F (35.4  C) Temp src: Oral BP: 103/54 Pulse: 78 Heart Rate: 53 Resp: 20 SpO2: 92 % O2 Device: Nasal cannula Oxygen Delivery: 2 LPM  Vitals:    11/25/18 1613   Weight: 75.5 kg (166 lb 6.4 oz)     Vital Signs with Ranges  Temp:  [95.7  F (35.4  C)-98.5  F (36.9  C)] 95.7  F (35.4  C)  Pulse:  [78] 78  Heart Rate:  [53-79] 53  Resp:  [16-20] 20  BP: (100-116)/(51-59) 103/54  SpO2:  [92 %-97 %] 92 %  I/O last 3 completed shifts:  In: 983 [P.O.:980; I.V.:3]  Out: 480 [Urine:480]    General: alert, awake and no apparent distress  CVS: regular rate and rhythm  GI: soft and non-tender  Resp: mild right basilar crackle, other lungs fields are clear to ausculation  Ext: trace LE edema bilaterally    Discharge Disposition   Discharged to home  Condition at discharge: Stable    Consultations This Hospital Stay   None    Time Spent on this Encounter   ILucy, personally saw the patient today and spent 40 minutes discharging this patient.    Discharge Orders     Follow-up and recommended labs and tests    Follow up with primary care provider, González Cornelius, within 3-5 days for hospital follow- up.  No follow up labs or test are needed.     Activity   Your activity upon discharge: activity as tolerated     Diet   Follow this diet upon discharge: Orders Placed This Encounter     Combination Diet Regular Diet Adult       Discharge Medications   Current Discharge Medication List      START taking these medications    Details   azithromycin (ZITHROMAX) 250 MG tablet Take 1 tablet (250 mg) by mouth daily  Qty: 2 tablet, Refills: 0    Associated Diagnoses: Community acquired pneumonia of right lower lobe of lung (H)      cefdinir (OMNICEF) 300 MG capsule Take 1 capsule (300 mg) by mouth 2 times daily for 4 days  Qty: 8 capsule, Refills: 0     Associated Diagnoses: Community acquired pneumonia of right lower lobe of lung (H)         CONTINUE these medications which have NOT CHANGED    Details   acetaminophen-caffeine (EXCEDRIN TENSION HEADACHE) 500-65 MG TABS Take 2 tablets by mouth every 6 hours as needed for mild pain      Cholecalciferol (VITAMIN D3) 2000 units TABS Take 2,000 Units by mouth every morning      Fluticasone-Salmeterol (AIRDUO RESPICLICK) 113-14 MCG/ACT AEPB inhaler Inhale 1 puff into the lungs 2 times daily      Ipratropium-Albuterol (COMBIVENT RESPIMAT)  MCG/ACT inhaler Inhale 1 puff into the lungs every 4 hours       Multiple Vitamins-Minerals (CENTRUM SILVER) per tablet Take 1 tablet by mouth daily           Allergies   Allergies   Allergen Reactions     Levaquin [Levofloxacin]      Data   Most Recent 3 CBC's:  Recent Labs   Lab Test  11/26/18   0742  11/25/18   1312  08/04/18   0551  08/03/18   0705   WBC  10.7  13.2*   --   10.2   HGB  12.2*  13.6   --   11.7*   MCV  96  95   --   95   PLT  268  289  358  371      Most Recent 3 BMP's:  Recent Labs   Lab Test  11/26/18   0742  11/25/18   1312  08/02/18   0630   NA  141  140  139   POTASSIUM  3.7  4.0  4.5   CHLORIDE  109  105  103   CO2  25  29  33*   BUN  16  19  22   CR  0.77  1.05  0.69   ANIONGAP  7  6  3   KATELYNN  8.5  8.9  8.7   GLC  88  134*  80     Most Recent 2 LFT's:  Recent Labs   Lab Test  11/26/18   0742  07/30/18   1505   AST  18  19   ALT  20  27   ALKPHOS  72  133   BILITOTAL  0.5  0.8     Most Recent INR's and Anticoagulation Dosing History:  Anticoagulation Dose History     Recent Dosing and Labs Latest Ref Rng & Units 8/2/2018 8/3/2018 8/4/2018    Warfarin 5 mg - - 5 mg -    Warfarin 7.5 mg - 7.5 mg - -    INR 0.86 - 1.14 1.12 1.32(H) 1.78(H)        Most Recent 3 Troponin's:  Recent Labs   Lab Test  07/30/18   2254  07/30/18   1922  07/30/18   1505   TROPI  0.189*  0.213*  0.256*     Most Recent Cholesterol Panel:No lab results found.  Most Recent 6 Bacteria  Isolates From Any Culture (See EPIC Reports for Culture Details):  Recent Labs   Lab Test  11/25/18   1453  11/25/18   1446  07/31/18   0930  07/30/18   1539  07/30/18   1505  06/02/18   0940   CULT  No growth after 2 days  No growth after 2 days  Moderate growth  Normal rohini    No growth  No growth  Canceled, Test credited  Duplicate request       Most Recent TSH, T4 and A1c Labs:  Recent Labs   Lab Test  07/31/18   0613   TSH  0.45     Results for orders placed or performed during the hospital encounter of 11/25/18   XR Chest 2 Views    Narrative    CHEST TWO VIEWS    11/25/2018 2:19 PM     HISTORY: Shortness of breath. Fever.     COMPARISON: 7/30/2018.      Impression    IMPRESSION: New patchy opacification at the left peripheral mid to low  lung that may represent airspace disease. New but small right pleural  effusion. Trace left pleural fluid is stable. Diffuse bilateral  interstitial prominence again identified and could represent chronic  underlying pulmonary disease. Stable cardiac silhouette.    CRISTIANA MEDINA MD

## 2018-11-27 NOTE — PLAN OF CARE
Problem: Patient Care Overview  Goal: Plan of Care/Patient Progress Review  Outcome: No Change  Pt A&Ox4, VSS, afebrile.  Reported 4/10 headache pain, gave Excedrin. Lungs dim t/o on 2L NC. Productive cough, yellow/white mucus.  Respiratory following--RCAT. +4BS, BM 11/25/18, tolerating regular diet.  Gave pt urinal to measure urine, explained to him he's I&Os.  Voiding yellow urine.  PIV is SL to left forearm.  Up independently in room.  Plan:  IV Abx, pain control, I&Os. Discharge to home with wife in 1-2 days if breathing improved and afebrile per MD note.  Will continue to monitor.

## 2018-11-27 NOTE — PROGRESS NOTES
Transition Communication Hand-off for Care Transitions to Next Level of Care Provider    Name: Mahad Ward  : 1945  MRN #: [id number]  Primary Care Provider: González Cornelius  Primary Care MD Name: González Cornelius  Primary Clinic: [facility] [place] [address]  [place] [address]  Primary Care Clinic Name: [facility]  Reason for Hospitalization:  Chronic obstructive pulmonary disease with acute lower respiratory infection (H) [J44.0]  Community acquired pneumonia of right lower lobe of lung (H) [J18.1]  Admit Date/Time: 2018  1:00 PM  Discharge Date:   Payor Source: Payor: [employer]OLD Guidance / Plan: [place] PLAN / Product Type: HMO /     Readmission Assessment Measure (SHERRELL) Risk Score/category: Elevated    Reason for Communication Hand-off Referral: Admission diagnoses: PN    Discharge Plan: home with spouse and antibiotics       Concern for non-adherence with plan of care:   no  Discharge Needs Assessment:  Needs       Most Recent Value    Transportation Available family or friend will provide, car    # of Referrals Placed by CTS Communication hand-offs to next level of Care Providers        Follow-up plan:  No future appointments.    Any outstanding tests or procedures:    none    Key Recommendations:  FYI of pt hospital admission:  Pt was admitted with sob/PNA sepsis. He was treated with IVF/zithromax/rocephin, nebs and steroids. He has improved and will be discharging back home with his wife and continued home O2, new scripts for zithromax and omnicef. No recommended labs for follow up. Pt will make his own appt recommended next week.          Hilaria Trimble RN CTS    AVS/Discharge Summary is the source of truth; this is a helpful guide for improved communication of patient story

## 2018-11-28 LAB
BACTERIA SPEC CULT: NORMAL
SPECIMEN SOURCE: NORMAL

## 2019-01-17 ENCOUNTER — DOCUMENTATION ONLY (OUTPATIENT)
Dept: OTHER | Facility: CLINIC | Age: 74
End: 2019-01-17

## 2024-02-07 ENCOUNTER — APPOINTMENT (OUTPATIENT)
Dept: CT IMAGING | Facility: CLINIC | Age: 79
DRG: 177 | End: 2024-02-07
Attending: BEHAVIOR TECHNICIAN
Payer: COMMERCIAL

## 2024-02-07 ENCOUNTER — HOSPITAL ENCOUNTER (INPATIENT)
Facility: CLINIC | Age: 79
LOS: 3 days | Discharge: HOME | DRG: 177 | End: 2024-02-10
Attending: EMERGENCY MEDICINE | Admitting: INTERNAL MEDICINE
Payer: COMMERCIAL

## 2024-02-07 ENCOUNTER — APPOINTMENT (OUTPATIENT)
Dept: GENERAL RADIOLOGY | Facility: CLINIC | Age: 79
DRG: 177 | End: 2024-02-07
Attending: BEHAVIOR TECHNICIAN
Payer: COMMERCIAL

## 2024-02-07 DIAGNOSIS — J18.9 COMMUNITY ACQUIRED PNEUMONIA OF RIGHT LOWER LOBE OF LUNG: ICD-10-CM

## 2024-02-07 DIAGNOSIS — E83.42 HYPOMAGNESEMIA: ICD-10-CM

## 2024-02-07 DIAGNOSIS — Z99.81 OXYGEN DEPENDENT: ICD-10-CM

## 2024-02-07 DIAGNOSIS — U07.1 COVID-19: ICD-10-CM

## 2024-02-07 DIAGNOSIS — I48.91 ATRIAL FIBRILLATION WITH RVR (H): ICD-10-CM

## 2024-02-07 DIAGNOSIS — Z87.09 HISTORY OF COPD: ICD-10-CM

## 2024-02-07 DIAGNOSIS — I26.99 PULMONARY EMBOLISM (H): ICD-10-CM

## 2024-02-07 LAB
ALBUMIN SERPL BCG-MCNC: 3.5 G/DL (ref 3.5–5.2)
ALP SERPL-CCNC: 44 U/L (ref 40–150)
ALT SERPL W P-5'-P-CCNC: 17 U/L (ref 0–70)
ANION GAP SERPL CALCULATED.3IONS-SCNC: 7 MMOL/L (ref 7–15)
AST SERPL W P-5'-P-CCNC: 33 U/L (ref 0–45)
BASE EXCESS BLDV CALC-SCNC: 3.2 MMOL/L (ref -3–3)
BASOPHILS # BLD AUTO: 0 10E3/UL (ref 0–0.2)
BASOPHILS NFR BLD AUTO: 0 %
BILIRUB DIRECT SERPL-MCNC: <0.2 MG/DL (ref 0–0.3)
BILIRUB SERPL-MCNC: 0.4 MG/DL
BUN SERPL-MCNC: 15.5 MG/DL (ref 8–23)
CALCIUM SERPL-MCNC: 8.7 MG/DL (ref 8.8–10.2)
CHLORIDE SERPL-SCNC: 105 MMOL/L (ref 98–107)
CREAT SERPL-MCNC: 1.1 MG/DL (ref 0.67–1.17)
CRP SERPL-MCNC: 130.92 MG/L
D DIMER PPP FEU-MCNC: 1.52 UG/ML FEU (ref 0–0.5)
DEPRECATED HCO3 PLAS-SCNC: 29 MMOL/L (ref 22–29)
EGFRCR SERPLBLD CKD-EPI 2021: 68 ML/MIN/1.73M2
EOSINOPHIL # BLD AUTO: 0 10E3/UL (ref 0–0.7)
EOSINOPHIL NFR BLD AUTO: 0 %
ERYTHROCYTE [DISTWIDTH] IN BLOOD BY AUTOMATED COUNT: 13.7 % (ref 10–15)
FLUAV RNA SPEC QL NAA+PROBE: NEGATIVE
FLUBV RNA RESP QL NAA+PROBE: NEGATIVE
GLUCOSE SERPL-MCNC: 123 MG/DL (ref 70–99)
HCO3 BLDV-SCNC: 30 MMOL/L (ref 21–28)
HCT VFR BLD AUTO: 37.6 % (ref 40–53)
HGB BLD-MCNC: 11.9 G/DL (ref 13.3–17.7)
IMM GRANULOCYTES # BLD: 0 10E3/UL
IMM GRANULOCYTES NFR BLD: 0 %
LYMPHOCYTES # BLD AUTO: 0.8 10E3/UL (ref 0.8–5.3)
LYMPHOCYTES NFR BLD AUTO: 12 %
MAGNESIUM SERPL-MCNC: 1.6 MG/DL (ref 1.7–2.3)
MCH RBC QN AUTO: 30.1 PG (ref 26.5–33)
MCHC RBC AUTO-ENTMCNC: 31.6 G/DL (ref 31.5–36.5)
MCV RBC AUTO: 95 FL (ref 78–100)
MONOCYTES # BLD AUTO: 0.5 10E3/UL (ref 0–1.3)
MONOCYTES NFR BLD AUTO: 8 %
NEUTROPHILS # BLD AUTO: 5.4 10E3/UL (ref 1.6–8.3)
NEUTROPHILS NFR BLD AUTO: 80 %
NRBC # BLD AUTO: 0 10E3/UL
NRBC BLD AUTO-RTO: 0 /100
NT-PROBNP SERPL-MCNC: 2977 PG/ML (ref 0–1800)
O2/TOTAL GAS SETTING VFR VENT: 4 %
OXYHGB MFR BLDV: 43 % (ref 70–75)
PCO2 BLDV: 55 MM HG (ref 40–50)
PH BLDV: 7.34 [PH] (ref 7.32–7.43)
PLATELET # BLD AUTO: 215 10E3/UL (ref 150–450)
PO2 BLDV: 26 MM HG (ref 25–47)
POTASSIUM SERPL-SCNC: 4 MMOL/L (ref 3.4–5.3)
PROT SERPL-MCNC: 6.5 G/DL (ref 6.4–8.3)
RBC # BLD AUTO: 3.96 10E6/UL (ref 4.4–5.9)
RSV RNA SPEC NAA+PROBE: NEGATIVE
SAO2 % BLDV: 43.3 % (ref 70–75)
SARS-COV-2 RNA RESP QL NAA+PROBE: POSITIVE
SODIUM SERPL-SCNC: 141 MMOL/L (ref 135–145)
TROPONIN T SERPL HS-MCNC: 25 NG/L
TROPONIN T SERPL HS-MCNC: 25 NG/L
WBC # BLD AUTO: 6.8 10E3/UL (ref 4–11)

## 2024-02-07 PROCEDURE — 96361 HYDRATE IV INFUSION ADD-ON: CPT

## 2024-02-07 PROCEDURE — 82805 BLOOD GASES W/O2 SATURATION: CPT | Performed by: EMERGENCY MEDICINE

## 2024-02-07 PROCEDURE — 93005 ELECTROCARDIOGRAM TRACING: CPT | Mod: 76

## 2024-02-07 PROCEDURE — 258N000003 HC RX IP 258 OP 636: Performed by: BEHAVIOR TECHNICIAN

## 2024-02-07 PROCEDURE — 99285 EMERGENCY DEPT VISIT HI MDM: CPT | Mod: 25

## 2024-02-07 PROCEDURE — 96375 TX/PRO/DX INJ NEW DRUG ADDON: CPT

## 2024-02-07 PROCEDURE — 87637 SARSCOV2&INF A&B&RSV AMP PRB: CPT | Performed by: BEHAVIOR TECHNICIAN

## 2024-02-07 PROCEDURE — 84484 ASSAY OF TROPONIN QUANT: CPT | Performed by: EMERGENCY MEDICINE

## 2024-02-07 PROCEDURE — 250N000011 HC RX IP 250 OP 636: Performed by: BEHAVIOR TECHNICIAN

## 2024-02-07 PROCEDURE — 36415 COLL VENOUS BLD VENIPUNCTURE: CPT | Performed by: EMERGENCY MEDICINE

## 2024-02-07 PROCEDURE — 258N000003 HC RX IP 258 OP 636: Performed by: EMERGENCY MEDICINE

## 2024-02-07 PROCEDURE — 99223 1ST HOSP IP/OBS HIGH 75: CPT | Performed by: INTERNAL MEDICINE

## 2024-02-07 PROCEDURE — 83735 ASSAY OF MAGNESIUM: CPT | Performed by: EMERGENCY MEDICINE

## 2024-02-07 PROCEDURE — 82374 ASSAY BLOOD CARBON DIOXIDE: CPT | Performed by: BEHAVIOR TECHNICIAN

## 2024-02-07 PROCEDURE — 85025 COMPLETE CBC W/AUTO DIFF WBC: CPT | Performed by: BEHAVIOR TECHNICIAN

## 2024-02-07 PROCEDURE — 93005 ELECTROCARDIOGRAM TRACING: CPT

## 2024-02-07 PROCEDURE — 83880 ASSAY OF NATRIURETIC PEPTIDE: CPT | Performed by: BEHAVIOR TECHNICIAN

## 2024-02-07 PROCEDURE — 82248 BILIRUBIN DIRECT: CPT | Performed by: INTERNAL MEDICINE

## 2024-02-07 PROCEDURE — 250N000011 HC RX IP 250 OP 636: Performed by: EMERGENCY MEDICINE

## 2024-02-07 PROCEDURE — 36415 COLL VENOUS BLD VENIPUNCTURE: CPT | Performed by: BEHAVIOR TECHNICIAN

## 2024-02-07 PROCEDURE — 86140 C-REACTIVE PROTEIN: CPT | Performed by: INTERNAL MEDICINE

## 2024-02-07 PROCEDURE — 71046 X-RAY EXAM CHEST 2 VIEWS: CPT

## 2024-02-07 PROCEDURE — 71275 CT ANGIOGRAPHY CHEST: CPT

## 2024-02-07 PROCEDURE — 96365 THER/PROPH/DIAG IV INF INIT: CPT | Mod: 59

## 2024-02-07 PROCEDURE — 120N000001 HC R&B MED SURG/OB

## 2024-02-07 PROCEDURE — 250N000012 HC RX MED GY IP 250 OP 636 PS 637: Performed by: INTERNAL MEDICINE

## 2024-02-07 PROCEDURE — 85379 FIBRIN DEGRADATION QUANT: CPT | Performed by: BEHAVIOR TECHNICIAN

## 2024-02-07 RX ORDER — AMOXICILLIN 250 MG
2 CAPSULE ORAL 2 TIMES DAILY PRN
Status: DISCONTINUED | OUTPATIENT
Start: 2024-02-07 | End: 2024-02-10 | Stop reason: HOSPADM

## 2024-02-07 RX ORDER — BISACODYL 10 MG
10 SUPPOSITORY, RECTAL RECTAL DAILY PRN
Status: DISCONTINUED | OUTPATIENT
Start: 2024-02-07 | End: 2024-02-10 | Stop reason: HOSPADM

## 2024-02-07 RX ORDER — FAMOTIDINE 40 MG/1
40 TABLET, FILM COATED ORAL DAILY
COMMUNITY

## 2024-02-07 RX ORDER — LIDOCAINE 40 MG/G
CREAM TOPICAL
Status: DISCONTINUED | OUTPATIENT
Start: 2024-02-07 | End: 2024-02-10 | Stop reason: HOSPADM

## 2024-02-07 RX ORDER — AMOXICILLIN 250 MG
1 CAPSULE ORAL 2 TIMES DAILY PRN
Status: DISCONTINUED | OUTPATIENT
Start: 2024-02-07 | End: 2024-02-10 | Stop reason: HOSPADM

## 2024-02-07 RX ORDER — MAGNESIUM SULFATE HEPTAHYDRATE 40 MG/ML
2 INJECTION, SOLUTION INTRAVENOUS ONCE
Status: COMPLETED | OUTPATIENT
Start: 2024-02-07 | End: 2024-02-07

## 2024-02-07 RX ORDER — IOPAMIDOL 755 MG/ML
500 INJECTION, SOLUTION INTRAVASCULAR ONCE
Status: COMPLETED | OUTPATIENT
Start: 2024-02-07 | End: 2024-02-07

## 2024-02-07 RX ORDER — AZITHROMYCIN 500 MG/5ML
500 INJECTION, POWDER, LYOPHILIZED, FOR SOLUTION INTRAVENOUS ONCE
Status: DISCONTINUED | OUTPATIENT
Start: 2024-02-07 | End: 2024-02-07

## 2024-02-07 RX ORDER — GUAIFENESIN 200 MG/10ML
200 LIQUID ORAL EVERY 4 HOURS PRN
Status: DISCONTINUED | OUTPATIENT
Start: 2024-02-07 | End: 2024-02-10 | Stop reason: HOSPADM

## 2024-02-07 RX ORDER — HYDRALAZINE HYDROCHLORIDE 10 MG/1
10 TABLET, FILM COATED ORAL EVERY 4 HOURS PRN
Status: DISCONTINUED | OUTPATIENT
Start: 2024-02-07 | End: 2024-02-10 | Stop reason: HOSPADM

## 2024-02-07 RX ORDER — CALCIUM CARBONATE 500 MG/1
1000 TABLET, CHEWABLE ORAL 4 TIMES DAILY PRN
Status: DISCONTINUED | OUTPATIENT
Start: 2024-02-07 | End: 2024-02-10 | Stop reason: HOSPADM

## 2024-02-07 RX ORDER — HYDRALAZINE HYDROCHLORIDE 20 MG/ML
10 INJECTION INTRAMUSCULAR; INTRAVENOUS EVERY 4 HOURS PRN
Status: DISCONTINUED | OUTPATIENT
Start: 2024-02-07 | End: 2024-02-10 | Stop reason: HOSPADM

## 2024-02-07 RX ORDER — CEFTRIAXONE 1 G/1
1 INJECTION, POWDER, FOR SOLUTION INTRAMUSCULAR; INTRAVENOUS ONCE
Status: COMPLETED | OUTPATIENT
Start: 2024-02-07 | End: 2024-02-07

## 2024-02-07 RX ORDER — ROSUVASTATIN CALCIUM 10 MG/1
10 TABLET, COATED ORAL AT BEDTIME
COMMUNITY

## 2024-02-07 RX ORDER — MULTIPLE VITAMINS W/ MINERALS TAB 9MG-400MCG
1 TAB ORAL DAILY
Status: DISCONTINUED | OUTPATIENT
Start: 2024-02-08 | End: 2024-02-10 | Stop reason: HOSPADM

## 2024-02-07 RX ORDER — HEPARIN SODIUM 10000 [USP'U]/100ML
0-5000 INJECTION, SOLUTION INTRAVENOUS CONTINUOUS
Status: DISCONTINUED | OUTPATIENT
Start: 2024-02-07 | End: 2024-02-09

## 2024-02-07 RX ORDER — ONDANSETRON 2 MG/ML
4 INJECTION INTRAMUSCULAR; INTRAVENOUS EVERY 6 HOURS PRN
Status: DISCONTINUED | OUTPATIENT
Start: 2024-02-07 | End: 2024-02-10 | Stop reason: HOSPADM

## 2024-02-07 RX ORDER — AZITHROMYCIN 250 MG/1
250 TABLET, FILM COATED ORAL DAILY
Status: DISCONTINUED | OUTPATIENT
Start: 2024-02-08 | End: 2024-02-10 | Stop reason: HOSPADM

## 2024-02-07 RX ORDER — HYDROMORPHONE HCL IN WATER/PF 6 MG/30 ML
0.4 PATIENT CONTROLLED ANALGESIA SYRINGE INTRAVENOUS
Status: DISCONTINUED | OUTPATIENT
Start: 2024-02-07 | End: 2024-02-09

## 2024-02-07 RX ORDER — PIPERACILLIN SODIUM, TAZOBACTAM SODIUM 4; .5 G/20ML; G/20ML
4.5 INJECTION, POWDER, LYOPHILIZED, FOR SOLUTION INTRAVENOUS EVERY 6 HOURS
Qty: 560 ML | Refills: 0 | Status: DISCONTINUED | OUTPATIENT
Start: 2024-02-07 | End: 2024-02-10 | Stop reason: HOSPADM

## 2024-02-07 RX ORDER — IPRATROPIUM BROMIDE AND ALBUTEROL SULFATE 2.5; .5 MG/3ML; MG/3ML
3 SOLUTION RESPIRATORY (INHALATION) 4 TIMES DAILY
Status: DISCONTINUED | OUTPATIENT
Start: 2024-02-08 | End: 2024-02-08

## 2024-02-07 RX ORDER — ASPIRIN 81 MG/1
81 TABLET ORAL DAILY
Status: DISCONTINUED | OUTPATIENT
Start: 2024-02-08 | End: 2024-02-10 | Stop reason: HOSPADM

## 2024-02-07 RX ORDER — HYDROMORPHONE HYDROCHLORIDE 2 MG/1
2 TABLET ORAL EVERY 4 HOURS PRN
Status: DISCONTINUED | OUTPATIENT
Start: 2024-02-07 | End: 2024-02-10 | Stop reason: HOSPADM

## 2024-02-07 RX ORDER — HYDROMORPHONE HCL IN WATER/PF 6 MG/30 ML
0.2 PATIENT CONTROLLED ANALGESIA SYRINGE INTRAVENOUS
Status: DISCONTINUED | OUTPATIENT
Start: 2024-02-07 | End: 2024-02-09

## 2024-02-07 RX ORDER — ALBUTEROL SULFATE 0.83 MG/ML
3 SOLUTION RESPIRATORY (INHALATION)
Status: DISCONTINUED | OUTPATIENT
Start: 2024-02-07 | End: 2024-02-10 | Stop reason: HOSPADM

## 2024-02-07 RX ORDER — FLUTICASONE FUROATE AND VILANTEROL 100; 25 UG/1; UG/1
1 POWDER RESPIRATORY (INHALATION) DAILY
Status: DISCONTINUED | OUTPATIENT
Start: 2024-02-08 | End: 2024-02-10 | Stop reason: HOSPADM

## 2024-02-07 RX ORDER — ONDANSETRON 4 MG/1
4 TABLET, ORALLY DISINTEGRATING ORAL EVERY 6 HOURS PRN
Status: DISCONTINUED | OUTPATIENT
Start: 2024-02-07 | End: 2024-02-10 | Stop reason: HOSPADM

## 2024-02-07 RX ORDER — ACETAMINOPHEN 325 MG/1
650 TABLET ORAL EVERY 4 HOURS PRN
Status: DISCONTINUED | OUTPATIENT
Start: 2024-02-07 | End: 2024-02-10 | Stop reason: HOSPADM

## 2024-02-07 RX ORDER — ACETAMINOPHEN 650 MG/1
650 SUPPOSITORY RECTAL EVERY 4 HOURS PRN
Status: DISCONTINUED | OUTPATIENT
Start: 2024-02-07 | End: 2024-02-10 | Stop reason: HOSPADM

## 2024-02-07 RX ORDER — AZELASTINE 1 MG/ML
1 SPRAY, METERED NASAL 2 TIMES DAILY
COMMUNITY

## 2024-02-07 RX ADMIN — DEXAMETHASONE 6 MG: 2 TABLET ORAL at 23:59

## 2024-02-07 RX ADMIN — IOPAMIDOL 70 ML: 755 INJECTION, SOLUTION INTRAVENOUS at 19:59

## 2024-02-07 RX ADMIN — CEFTRIAXONE 1 G: 1 INJECTION, POWDER, FOR SOLUTION INTRAMUSCULAR; INTRAVENOUS at 21:45

## 2024-02-07 RX ADMIN — SODIUM CHLORIDE 500 ML: 9 INJECTION, SOLUTION INTRAVENOUS at 17:05

## 2024-02-07 RX ADMIN — MAGNESIUM SULFATE HEPTAHYDRATE 2 G: 2 INJECTION, SOLUTION INTRAVENOUS at 18:26

## 2024-02-07 RX ADMIN — AZITHROMYCIN MONOHYDRATE 500 MG: 500 INJECTION, POWDER, LYOPHILIZED, FOR SOLUTION INTRAVENOUS at 22:13

## 2024-02-07 RX ADMIN — SODIUM CHLORIDE 250 ML: 9 INJECTION, SOLUTION INTRAVENOUS at 18:26

## 2024-02-07 ASSESSMENT — ACTIVITIES OF DAILY LIVING (ADL)
ADLS_ACUITY_SCORE: 35

## 2024-02-07 NOTE — ED PROVIDER NOTES
Emergency Department Attending Supervision Note  2/7/2024  4:37 PM    I evaluated this patient in conjunction with Dandre Duran PA-C  I have participated in the care of the patient and personally performed key elements of the history, exam, and medical decision making.      HPI:   Mahad Ward is a 78 year old male who presents from  due to increasing SOB since Monday. Has COPD and wears 2LPM NC @ home. He can use up to 4LPM at home with exertion at baseline, and has been using 4 LPM all the time the last several days. Baseline cough with sputum, but increasing since Monday. Wife was diagnosed with COVID on Monday and is in the hospital. Due to increasing symptoms, he took a home test that was positive at home last night.     No fevers, but notes chills, HA. Has been able to eat/drink ok. Went to  today and was sent here due to hypotension and tachycardia.      Independent Historian:   Daughter - They report and corroborate the above HPI    Review of External Notes:   None      EXAM:   Eyes: PEERL, EOMI B/L  Neck: No JVD noted. FROM   Oropharynx: Tacky MM  Cardiovascular: tachycardic rate, Irregular rhythm and normal heart sounds.  No murmur heard. Equal B/L peripheral pulses.  Pulmonary/Chest: Effort normal and breath sounds normal. No respiratory distress. Patient has no wheezes. Patient has no rales.   Gastrointestinal: Soft. There is no tenderness.   Musculoskeletal/Extremities: No pitting edema noted. Normal tone.  Neurological: Alert  Skin: Skin is warm and dry. There is no diaphoresis noted.   Psychiatric: The patient appears calm.      Assessments, Consultations/Discussion of Management or Tests, Independent Image interpretation    ED Course as of 02/07/24 2209 Wed Feb 07, 2024 1637 Dr. Lee discussed the case with Dandre Duran PA-C. Discussed presentation, exam, ddx, plan.   1647 Dr. Lee' evaluation.   1754 XR Chest 2 Views  My interpretation: Hyperinflated lungs. No gross infiltrate. Likely  pleural effusions.   2039 CT Chest Pulmonary Embolism w Contrast   2109 Discussed CT results with patient. Patient agreeable to be admitted.   2139 D/W Dr. Sorenson who accepted the patient.          Social Determinants of Health affecting care:   None     MEDICAL DECISION MAKING/ASSESSMENT AND PLAN:   This 78-year-old male patient presents to the ED due to increasing shortness of breath.  Please see the HPI and exam for specifics.  A broad differential was considered.  I was not surprised to find that he is positive for COVID given that his wife also tested positive.    The patient was also found to be in atrial fibrillation with rapid ventricular response.  He converted back to sinus rhythm after fluids.  I do note that his troponin is elevated but stable and his BNP is up.  These are likely reflective of his atrial fibrillation which is likely paroxysmal.  Magnesium was also somewhat low and this was replaced.    His D-dimer was elevated and ultimately CT of his chest was ordered.  Findings are noted above but he will be treated for pneumonia as well.    The patient will be admitted for ongoing care and placed on a heparin infusion.    Impression:    ICD-10-CM    1. Atrial fibrillation with RVR (H)  I48.91       2. Hypomagnesemia  E83.42       3. Community acquired pneumonia of right lower lobe of lung  J18.9       4. Pulmonary embolism (H)  I26.99       5. History of COPD  Z87.09       6. COVID-19  U07.1       7. Oxygen dependent  Z99.81              DISPOSITION:  The patient was admitted to the hospital under the care of Dr. Sorenson.      Morgan Lee, DO  2/7/2024  [facility] EMERGENCY DEPT               Morgan Lee, DO  02/07/24 7661

## 2024-02-07 NOTE — ED PROVIDER NOTES
History     Chief Complaint:  Shortness of Breath       [facility]   Mahad Ward is a 78 year old male presents to the ED for shortness of breath and cough. Patient has a history of COPD and on 2L chronic oxygen supplementation at home with an increase to 4L when exerting. He has been having increasing shortness of breath and cough since Monday. Wife tested positive for COVID on Monday and was admitted into the hospital. He took a test last night which came back negative. He denies fever but endorses chills. He also had a headache. Tried Tylenol at home which has not helped. Denies nausea, vomiting, urinary issues. He went to Urgent Care today at was noted to be tachycardic with pulse of 120, and hypotensive 88/61. His daughter then brought him to the ER.       Independent Historian:    Patient and daughter who state above history.     Review of External Notes:  Urgent care visit today -   notes included in HPI.     Medications:    No current outpatient medications on file.      Past Medical History:    Past Medical History:   Diagnosis Date    BPH (benign prostatic hyperplasia)     COPD (chronic obstructive pulmonary disease) (H)     Prostate enlargement        Past Surgical History:    Past Surgical History:   Procedure Laterality Date    PROSTATE SURGERY            Physical Exam   Patient Vitals for the past 24 hrs:   BP Temp Temp src Pulse Resp SpO2 Height Weight   02/07/24 2250 122/62 98  F (36.7  C) -- 82 20 95 % -- --   02/07/24 2224 122/81 -- -- 79 18 99 % -- 86.8 kg (191 lb 6.4 oz)   02/07/24 1922 -- -- -- -- -- 97 % -- --   02/07/24 1921 -- -- -- 76 -- 99 % -- --   02/07/24 1920 -- -- -- 77 -- 97 % -- --   02/07/24 1919 -- -- -- 77 -- 99 % -- --   02/07/24 1918 -- -- -- 76 -- 99 % -- --   02/07/24 1917 -- -- -- 77 -- 100 % -- --   02/07/24 1916 -- -- -- 76 -- 100 % -- --   02/07/24 1900 112/74 -- -- 75 -- 100 % -- --   02/07/24 1844 119/68 -- -- 79 -- 100 % -- --   02/07/24 1829 127/78 -- -- 80 -- -- --  "--   02/07/24 1813 120/78 -- -- 81 -- 100 % -- --   02/07/24 1804 -- -- -- -- -- 100 % -- --   02/07/24 1734 105/72 -- -- (!) 126 -- -- -- --   02/07/24 1703 (!) 55/43 -- -- (!) 137 -- -- -- --   02/07/24 1702 102/67 -- -- (!) 130 -- -- -- --   02/07/24 1656 99/77 -- -- (!) 142 -- -- -- --   02/07/24 1605 (!) 116/96 -- -- -- -- 100 % -- --   02/07/24 1555 95/85 97.8  F (36.6  C) Temporal 95 26 99 % 1.778 m (5' 10\") --        Physical Exam  Physical Exam:  General: alert  Head: normocephalic, atraumatic  Throat: dry mucous membranes  CV: irregular, tachycardic, no murmur/gallop/rubs  Pulm: labored breathing, slight wheezing on auscultation of bilateral lungs, on 4L oxygen.   Abdomen: soft, non-tender, non-distended, no rigidity or guarding, normal BS  Ext: No LE edema  Skin: Warm, dry, no rashes  Neuro: A&O x3, normal speech. No focal neurologic deficits. Muscle strength 5/5 bilaterally. Sensation is intact  Psych: Appropriate mood. Cooperative      Emergency Department Course   ECG #1  ECG taken at 1647, ECG read at 1655   Atrial fibrillation with RVR  Nonspecific ST abnormality  Abnormal ECG  When compared with prior ECG, dated 11/25/18, atrial fibrillation with RVR is new today  Rate 151 bpm. MI interval * ms. QRS duration 74 ms. QT/QTc 262/415 ms. P-R-T axes * -21 40.     ECG #2  ECG taken at 1951, ECG read at 1954  Sinus rhythm with frequent premature ventricular complexes  Right atrial enlargement   Borderline ECG   Sinus rhythm with PVCs has replaced atrial fibrillation with RVR as compared to prior, dated 2/7/24.  Rate 77 bpm. MI interval 162 ms. QRS duration 78 ms. QT/QTc 374/423 ms. P-R-T axes 83 12 79.     Imaging:  CT Chest Pulmonary Embolism w Contrast   Final Result   IMPRESSION:   1.  There are 2 small chronic appearing segmental pulmonary emboli within the right lung. No left-sided emboli. No evidence for elevated right heart pressure.   2.  Severe chronic/recurrent bronchitis and bronchiolitis with " small area of pneumonia in the right lower lobe laterally.   3.  Moderate emphysema.   4.  Cholelithiasis.      XR Chest 2 Views   Final Result   IMPRESSION:       Emphysematous, hyperinflated lungs, suggestive of COPD.      Small hazy airspace opacity in the lateral right lower lung appears similar to prior and is favored to reflect chronic changes from parenchymal scarring/fibrosis, although a superimposed infection is not excluded.      Remainder of the lungs appear similar, with biapical scarring and scattered strands of fibrosis. No pleural effusions. No pneumothorax. Stable cardiomediastinal silhouette.      Echocardiogram Complete    (Results Pending)     Report per radiology    Laboratory:  Labs Ordered and Resulted from Time of ED Arrival to Time of ED Departure   BASIC METABOLIC PANEL - Abnormal       Result Value    Sodium 141      Potassium 4.0      Chloride 105      Carbon Dioxide (CO2) 29      Anion Gap 7      Urea Nitrogen 15.5      Creatinine 1.10      GFR Estimate 68      Calcium 8.7 (*)     Glucose 123 (*)    D DIMER QUANTITATIVE - Abnormal    D-Dimer Quantitative 1.52 (*)    NT PROBNP INPATIENT - Abnormal    N terminal Pro BNP Inpatient 2,977 (*)    INFLUENZA A/B, RSV, & SARS-COV2 PCR - Abnormal    Influenza A PCR Negative      Influenza B PCR Negative      RSV PCR Negative      SARS CoV2 PCR Positive (*)    TROPONIN T, HIGH SENSITIVITY - Abnormal    Troponin T, High Sensitivity 25 (*)    MAGNESIUM - Abnormal    Magnesium 1.6 (*)    CBC WITH PLATELETS AND DIFFERENTIAL - Abnormal    WBC Count 6.8      RBC Count 3.96 (*)     Hemoglobin 11.9 (*)     Hematocrit 37.6 (*)     MCV 95      MCH 30.1      MCHC 31.6      RDW 13.7      Platelet Count 215      % Neutrophils 80      % Lymphocytes 12      % Monocytes 8      % Eosinophils 0      % Basophils 0      % Immature Granulocytes 0      NRBCs per 100 WBC 0      Absolute Neutrophils 5.4      Absolute Lymphocytes 0.8      Absolute Monocytes 0.5       Absolute Eosinophils 0.0      Absolute Basophils 0.0      Absolute Immature Granulocytes 0.0      Absolute NRBCs 0.0     BLOOD GAS VENOUS - Abnormal    pH Venous 7.34      pCO2 Venous 55 (*)     pO2 Venous 26      Bicarbonate Venous 30 (*)     Base Excess/Deficit Venous 3.2 (*)     FIO2 4      Oxyhemoglobin Venous 43 (*)     O2 Sat, Venous 43.3 (*)    TROPONIN T, HIGH SENSITIVITY - Abnormal    Troponin T, High Sensitivity 25 (*)    CRP INFLAMMATION - Abnormal    CRP Inflammation 130.92 (*)    HEPATIC FUNCTION PANEL - Normal    Protein Total 6.5      Albumin 3.5      Bilirubin Total 0.4      Alkaline Phosphatase 44      AST 33      ALT 17      Bilirubin Direct <0.20     CBC WITH PLATELETS        Procedures   none    Emergency Department Course & Assessments:       Interventions:  Medications   fluticasone-vilanterol (BREO ELLIPTA) 100-25 MCG/ACT inhaler 1 puff (has no administration in time range)   multivitamin w/minerals (THERA-VIT-M) tablet 1 tablet (has no administration in time range)   lidocaine 1 % 0.1-1 mL (has no administration in time range)   lidocaine (LMX4) cream (has no administration in time range)   sodium chloride (PF) 0.9% PF flush 3 mL (has no administration in time range)   sodium chloride (PF) 0.9% PF flush 3 mL (has no administration in time range)   senna-docusate (SENOKOT-S/PERICOLACE) 8.6-50 MG per tablet 1 tablet (has no administration in time range)     Or   senna-docusate (SENOKOT-S/PERICOLACE) 8.6-50 MG per tablet 2 tablet (has no administration in time range)   calcium carbonate (TUMS) chewable tablet 1,000 mg (has no administration in time range)   Patient is already receiving anticoagulation with heparin, enoxaparin (LOVENOX), warfarin (COUMADIN)  or other anticoagulant medication (has no administration in time range)   hydrALAZINE (APRESOLINE) tablet 10 mg (has no administration in time range)     Or   hydrALAZINE (APRESOLINE) injection 10 mg (has no administration in time range)    acetaminophen (TYLENOL) tablet 650 mg (has no administration in time range)     Or   acetaminophen (TYLENOL) Suppository 650 mg (has no administration in time range)   HYDROmorphone (DILAUDID) half-tab 1 mg (has no administration in time range)   HYDROmorphone (DILAUDID) tablet 2 mg (has no administration in time range)   HYDROmorphone (DILAUDID) injection 0.2 mg (has no administration in time range)   HYDROmorphone (DILAUDID) injection 0.4 mg (has no administration in time range)   melatonin tablet 1 mg (has no administration in time range)   bisacodyl (DULCOLAX) suppository 10 mg (has no administration in time range)   ondansetron (ZOFRAN ODT) ODT tab 4 mg (has no administration in time range)     Or   ondansetron (ZOFRAN) injection 4 mg (has no administration in time range)   guaiFENesin (ROBITUSSIN) 20 mg/mL solution 200 mg (has no administration in time range)   piperacillin-tazobactam (ZOSYN) 4.5 g vial to attach to  mL bag (has no administration in time range)   albuterol (PROVENTIL) neb solution 2.5 mg (has no administration in time range)   ipratropium - albuterol 0.5 mg/2.5 mg/3 mL (DUONEB) neb solution 3 mL (has no administration in time range)   heparin ANTICOAGULANT Loading dose for HIGH INTENSITY TREATMENT * Give BEFORE starting heparin infusion (has no administration in time range)   heparin 25,000 units in 0.45% NaCl 250 mL ANTICOAGULANT infusion (has no administration in time range)   azithromycin (ZITHROMAX) tablet 250 mg (has no administration in time range)   dexAMETHasone (DECADRON) tablet 6 mg (has no administration in time range)   remdesivir 200 mg in sodium chloride 0.9 % 250 mL intermittent infusion (has no administration in time range)     Followed by   sodium chloride 0.9% BOLUS 50 mL (has no administration in time range)   remdesivir 100 mg in sodium chloride 0.9 % 250 mL intermittent infusion (has no administration in time range)     And   sodium chloride 0.9% BOLUS 50 mL (has  no administration in time range)   aspirin EC tablet 81 mg (has no administration in time range)   sodium chloride 0.9% BOLUS 500 mL (0 mLs Intravenous Stopped 2/7/24 1743)   magnesium sulfate 2 g in 50 mL sterile water intermittent infusion (0 g Intravenous Stopped 2/7/24 1946)   sodium chloride 0.9% BOLUS 250 mL (0 mLs Intravenous Stopped 2/7/24 1946)   sodium chloride (PF) 0.9% PF flush 100 mL (90 mLs Intravenous $Given 2/7/24 1959)   iopamidol (ISOVUE-370) solution 500 mL (70 mLs Intravenous $Given 2/7/24 1959)   cefTRIAXone (ROCEPHIN) 1 g vial to attach to  mL bag for ADULTS or NS 50 mL bag for PEDS (0 g Intravenous Stopped 2/7/24 2218)        Assessments:  In ED course.    Independent Interpretation (X-rays, CTs, rhythm strip):  In ED course.    Consultations/Discussion of Management or Tests:  ED Course as of 02/07/24 2332   Wed Feb 07, 2024 1637 Dr. Lee discussed the case with Dandre Duran PA-C. Discussed presentation, exam, ddx, plan.   1647 Dr. Lee' evaluation.   1754 XR Chest 2 Views  My interpretation: Hyperinflated lungs. No gross infiltrate. Likely pleural effusions.   2039 CT Chest Pulmonary Embolism w Contrast   2109 Discussed CT results with patient. Patient agreeable to be admitted.   2139 D/W Dr. Sorenson who accepted the patient.        Social Determinants of Health affecting care:  none     Disposition:  The patient was admitted to the hospital under the care of Dr. Sorenson.     Impression & Plan        Medical Decision Making:  Patient presents as stated above. Patient tested positive for COVID last night. Wife also tested for COVID and has since been hospitalized. Has to increase baseline oxygen to 4L. He has been having increasing cough with sputum production, and hemoptysis. Patient denies chest pain. Patient seen at Urgent care and found to be tachycardic and hypotensive. Confirmatory COVID was done and came back positive. Initially wanting paxlovid and discharge, however was  found to be in afib RVR on initial EKG. Discussed with patient that further workup is warranted due to new afib and positive COVID. Basic labs were okay. Magnesium was low. Troponin was elevated but second one was flat. BNP was elevated. VBG showed hypercapnia with normal pH. Chest xray showed hyperinflated lungs with some fibrosis, consistent with COPD and chronic smoking. D-dimer was elevated. CT PE study showed right sided pneumonia, and 2 chronic appearing pulmonary emboli. No pneumothorax or pleural effusion based on radiology report. Patient given intial 500 bolus then an additional 250 due orthostatic hypotension. Repeat EKG showed normal sinus rhythm. Patient wanting to go home, however, I strongly recommended that he stay in the hospital for further medical management given that he has right sided pneumonia, PE, low mag, positive COVID, and requiring increase oxygen. After further discussion, patient agreeable to be admitted. Spoke with Dr. Sorenson who accepted the patient for med-tele. Patient started on IV ceftriaxone and azithromycin.       Diagnosis:    ICD-10-CM    1. Atrial fibrillation with RVR (H)  I48.91       2. Hypomagnesemia  E83.42       3. Community acquired pneumonia of right lower lobe of lung  J18.9       4. Pulmonary embolism (H)  I26.99       5. History of COPD  Z87.09       6. COVID-19  U07.1       7. Oxygen dependent  Z99.81            Discharge Medications:  Current Discharge Medication List             Dandre Duran PA-C  2/7/2024                Dandre Duran PA-C  02/07/24 [id number]

## 2024-02-07 NOTE — ED TRIAGE NOTES
Sent from [facility] for hypotension and tachycardia. Pt is covid+. Hx of COPD on 2L O2 at baseline. Pt on 3L currently, reports some SOB. Wet cough noted in triage.

## 2024-02-08 ENCOUNTER — APPOINTMENT (OUTPATIENT)
Dept: CARDIOLOGY | Facility: CLINIC | Age: 79
DRG: 177 | End: 2024-02-08
Attending: INTERNAL MEDICINE
Payer: COMMERCIAL

## 2024-02-08 LAB
ALBUMIN SERPL BCG-MCNC: 3.4 G/DL (ref 3.5–5.2)
ALP SERPL-CCNC: 44 U/L (ref 40–150)
ALT SERPL W P-5'-P-CCNC: 14 U/L (ref 0–70)
ANION GAP SERPL CALCULATED.3IONS-SCNC: 11 MMOL/L (ref 7–15)
AST SERPL W P-5'-P-CCNC: 30 U/L (ref 0–45)
ATRIAL RATE - MUSE: 141 BPM
ATRIAL RATE - MUSE: 156 BPM
ATRIAL RATE - MUSE: 77 BPM
BILIRUB SERPL-MCNC: 0.3 MG/DL
BUN SERPL-MCNC: 13.6 MG/DL (ref 8–23)
CA-I BLD-MCNC: 4.6 MG/DL (ref 4.4–5.2)
CALCIUM SERPL-MCNC: 8 MG/DL (ref 8.8–10.2)
CHLORIDE SERPL-SCNC: 105 MMOL/L (ref 98–107)
CREAT SERPL-MCNC: 0.87 MG/DL (ref 0.67–1.17)
DEPRECATED HCO3 PLAS-SCNC: 24 MMOL/L (ref 22–29)
DIASTOLIC BLOOD PRESSURE - MUSE: NORMAL MMHG
EGFRCR SERPLBLD CKD-EPI 2021: 88 ML/MIN/1.73M2
ERYTHROCYTE [DISTWIDTH] IN BLOOD BY AUTOMATED COUNT: 13.7 % (ref 10–15)
ERYTHROCYTE [DISTWIDTH] IN BLOOD BY AUTOMATED COUNT: 13.8 % (ref 10–15)
GLUCOSE SERPL-MCNC: 151 MG/DL (ref 70–99)
HCT VFR BLD AUTO: 33.2 % (ref 40–53)
HCT VFR BLD AUTO: 34.4 % (ref 40–53)
HGB BLD-MCNC: 10.6 G/DL (ref 13.3–17.7)
HGB BLD-MCNC: 11 G/DL (ref 13.3–17.7)
INTERPRETATION ECG - MUSE: NORMAL
LVEF ECHO: NORMAL
MAGNESIUM SERPL-MCNC: 2 MG/DL (ref 1.7–2.3)
MCH RBC QN AUTO: 30.5 PG (ref 26.5–33)
MCH RBC QN AUTO: 30.5 PG (ref 26.5–33)
MCHC RBC AUTO-ENTMCNC: 31.9 G/DL (ref 31.5–36.5)
MCHC RBC AUTO-ENTMCNC: 32 G/DL (ref 31.5–36.5)
MCV RBC AUTO: 95 FL (ref 78–100)
MCV RBC AUTO: 96 FL (ref 78–100)
P AXIS - MUSE: 83 DEGREES
P AXIS - MUSE: NORMAL DEGREES
P AXIS - MUSE: NORMAL DEGREES
PLATELET # BLD AUTO: 189 10E3/UL (ref 150–450)
PLATELET # BLD AUTO: 202 10E3/UL (ref 150–450)
POTASSIUM SERPL-SCNC: 4.2 MMOL/L (ref 3.4–5.3)
PR INTERVAL - MUSE: 162 MS
PR INTERVAL - MUSE: 208 MS
PR INTERVAL - MUSE: NORMAL MS
PROT SERPL-MCNC: 6.5 G/DL (ref 6.4–8.3)
QRS DURATION - MUSE: 74 MS
QRS DURATION - MUSE: 74 MS
QRS DURATION - MUSE: 78 MS
QT - MUSE: 262 MS
QT - MUSE: 286 MS
QT - MUSE: 374 MS
QTC - MUSE: 415 MS
QTC - MUSE: 423 MS
QTC - MUSE: 445 MS
R AXIS - MUSE: -21 DEGREES
R AXIS - MUSE: -6 DEGREES
R AXIS - MUSE: 12 DEGREES
RBC # BLD AUTO: 3.47 10E6/UL (ref 4.4–5.9)
RBC # BLD AUTO: 3.61 10E6/UL (ref 4.4–5.9)
SODIUM SERPL-SCNC: 140 MMOL/L (ref 135–145)
SYSTOLIC BLOOD PRESSURE - MUSE: NORMAL MMHG
T AXIS - MUSE: 40 DEGREES
T AXIS - MUSE: 79 DEGREES
T AXIS - MUSE: 87 DEGREES
UFH PPP CHRO-ACNC: 0.47 IU/ML
UFH PPP CHRO-ACNC: 0.68 IU/ML
UFH PPP CHRO-ACNC: 1 IU/ML
VENTRICULAR RATE- MUSE: 146 BPM
VENTRICULAR RATE- MUSE: 151 BPM
VENTRICULAR RATE- MUSE: 77 BPM
WBC # BLD AUTO: 5.9 10E3/UL (ref 4–11)
WBC # BLD AUTO: 7.9 10E3/UL (ref 4–11)

## 2024-02-08 PROCEDURE — 83735 ASSAY OF MAGNESIUM: CPT | Performed by: INTERNAL MEDICINE

## 2024-02-08 PROCEDURE — 999N000156 HC STATISTIC RCP CONSULT EA 30 MIN

## 2024-02-08 PROCEDURE — 94640 AIRWAY INHALATION TREATMENT: CPT

## 2024-02-08 PROCEDURE — 99233 SBSQ HOSP IP/OBS HIGH 50: CPT | Performed by: INTERNAL MEDICINE

## 2024-02-08 PROCEDURE — 120N000001 HC R&B MED SURG/OB

## 2024-02-08 PROCEDURE — 258N000003 HC RX IP 258 OP 636: Performed by: INTERNAL MEDICINE

## 2024-02-08 PROCEDURE — 93306 TTE W/DOPPLER COMPLETE: CPT

## 2024-02-08 PROCEDURE — 82330 ASSAY OF CALCIUM: CPT | Performed by: INTERNAL MEDICINE

## 2024-02-08 PROCEDURE — XW033E5 INTRODUCTION OF REMDESIVIR ANTI-INFECTIVE INTO PERIPHERAL VEIN, PERCUTANEOUS APPROACH, NEW TECHNOLOGY GROUP 5: ICD-10-PCS | Performed by: INTERNAL MEDICINE

## 2024-02-08 PROCEDURE — 85520 HEPARIN ASSAY: CPT | Performed by: INTERNAL MEDICINE

## 2024-02-08 PROCEDURE — 250N000011 HC RX IP 250 OP 636: Mod: JZ | Performed by: INTERNAL MEDICINE

## 2024-02-08 PROCEDURE — 93306 TTE W/DOPPLER COMPLETE: CPT | Mod: 26 | Performed by: INTERNAL MEDICINE

## 2024-02-08 PROCEDURE — 85018 HEMOGLOBIN: CPT | Performed by: INTERNAL MEDICINE

## 2024-02-08 PROCEDURE — 999N000157 HC STATISTIC RCP TIME EA 10 MIN

## 2024-02-08 PROCEDURE — 250N000009 HC RX 250: Performed by: INTERNAL MEDICINE

## 2024-02-08 PROCEDURE — 250N000012 HC RX MED GY IP 250 OP 636 PS 637: Performed by: INTERNAL MEDICINE

## 2024-02-08 PROCEDURE — 85027 COMPLETE CBC AUTOMATED: CPT | Performed by: INTERNAL MEDICINE

## 2024-02-08 PROCEDURE — 82040 ASSAY OF SERUM ALBUMIN: CPT | Performed by: INTERNAL MEDICINE

## 2024-02-08 PROCEDURE — 250N000013 HC RX MED GY IP 250 OP 250 PS 637: Performed by: INTERNAL MEDICINE

## 2024-02-08 PROCEDURE — 94640 AIRWAY INHALATION TREATMENT: CPT | Mod: 76

## 2024-02-08 PROCEDURE — 36415 COLL VENOUS BLD VENIPUNCTURE: CPT | Performed by: INTERNAL MEDICINE

## 2024-02-08 RX ORDER — AZELASTINE 1 MG/ML
1 SPRAY, METERED NASAL 2 TIMES DAILY
Status: DISCONTINUED | OUTPATIENT
Start: 2024-02-08 | End: 2024-02-10 | Stop reason: HOSPADM

## 2024-02-08 RX ORDER — NALOXONE HYDROCHLORIDE 0.4 MG/ML
0.2 INJECTION, SOLUTION INTRAMUSCULAR; INTRAVENOUS; SUBCUTANEOUS
Status: DISCONTINUED | OUTPATIENT
Start: 2024-02-08 | End: 2024-02-10 | Stop reason: HOSPADM

## 2024-02-08 RX ORDER — FAMOTIDINE 20 MG/1
40 TABLET, FILM COATED ORAL DAILY
Status: DISCONTINUED | OUTPATIENT
Start: 2024-02-08 | End: 2024-02-10 | Stop reason: HOSPADM

## 2024-02-08 RX ORDER — ROSUVASTATIN CALCIUM 10 MG/1
10 TABLET, COATED ORAL AT BEDTIME
Status: DISCONTINUED | OUTPATIENT
Start: 2024-02-08 | End: 2024-02-10 | Stop reason: HOSPADM

## 2024-02-08 RX ORDER — NALOXONE HYDROCHLORIDE 0.4 MG/ML
0.4 INJECTION, SOLUTION INTRAMUSCULAR; INTRAVENOUS; SUBCUTANEOUS
Status: DISCONTINUED | OUTPATIENT
Start: 2024-02-08 | End: 2024-02-10 | Stop reason: HOSPADM

## 2024-02-08 RX ADMIN — PIPERACILLIN AND TAZOBACTAM 4.5 G: 4; .5 INJECTION, POWDER, FOR SOLUTION INTRAVENOUS at 02:05

## 2024-02-08 RX ADMIN — Medication 1 TABLET: at 08:13

## 2024-02-08 RX ADMIN — GUAIFENESIN 200 MG: 200 SOLUTION ORAL at 06:31

## 2024-02-08 RX ADMIN — IPRATROPIUM BROMIDE AND ALBUTEROL 1 PUFF: 20; 100 SPRAY, METERED RESPIRATORY (INHALATION) at 19:42

## 2024-02-08 RX ADMIN — FLUTICASONE FUROATE AND VILANTEROL TRIFENATATE 1 PUFF: 100; 25 POWDER RESPIRATORY (INHALATION) at 08:13

## 2024-02-08 RX ADMIN — REMDESIVIR 100 MG: 100 INJECTION, POWDER, LYOPHILIZED, FOR SOLUTION INTRAVENOUS at 21:40

## 2024-02-08 RX ADMIN — HEPARIN SODIUM 1350 UNITS/HR: 10000 INJECTION, SOLUTION INTRAVENOUS at 09:22

## 2024-02-08 RX ADMIN — AZELASTINE 1 SPRAY: 1 SPRAY, METERED NASAL at 20:59

## 2024-02-08 RX ADMIN — IPRATROPIUM BROMIDE AND ALBUTEROL 1 PUFF: 20; 100 SPRAY, METERED RESPIRATORY (INHALATION) at 16:55

## 2024-02-08 RX ADMIN — DEXAMETHASONE 6 MG: 2 TABLET ORAL at 13:03

## 2024-02-08 RX ADMIN — PIPERACILLIN AND TAZOBACTAM 4.5 G: 4; .5 INJECTION, POWDER, FOR SOLUTION INTRAVENOUS at 08:26

## 2024-02-08 RX ADMIN — ROSUVASTATIN 10 MG: 10 TABLET, FILM COATED ORAL at 20:57

## 2024-02-08 RX ADMIN — PIPERACILLIN AND TAZOBACTAM 4.5 G: 4; .5 INJECTION, POWDER, FOR SOLUTION INTRAVENOUS at 20:57

## 2024-02-08 RX ADMIN — AZITHROMYCIN DIHYDRATE 250 MG: 250 TABLET ORAL at 08:13

## 2024-02-08 RX ADMIN — ASPIRIN 81 MG: 81 TABLET, COATED ORAL at 08:13

## 2024-02-08 RX ADMIN — HEPARIN SODIUM 1350 UNITS/HR: 10000 INJECTION, SOLUTION INTRAVENOUS at 14:39

## 2024-02-08 RX ADMIN — PIPERACILLIN AND TAZOBACTAM 4.5 G: 4; .5 INJECTION, POWDER, FOR SOLUTION INTRAVENOUS at 14:39

## 2024-02-08 RX ADMIN — REMDESIVIR 200 MG: 100 INJECTION, POWDER, LYOPHILIZED, FOR SOLUTION INTRAVENOUS at 00:17

## 2024-02-08 RX ADMIN — SODIUM CHLORIDE 50 ML: 9 INJECTION, SOLUTION INTRAVENOUS at 01:29

## 2024-02-08 RX ADMIN — AZELASTINE 1 SPRAY: 1 SPRAY, METERED NASAL at 10:17

## 2024-02-08 RX ADMIN — FAMOTIDINE 40 MG: 20 TABLET ORAL at 10:17

## 2024-02-08 ASSESSMENT — ACTIVITIES OF DAILY LIVING (ADL)
ADLS_ACUITY_SCORE: 23
ADLS_ACUITY_SCORE: 19
ADLS_ACUITY_SCORE: 23
ADLS_ACUITY_SCORE: 25
ADLS_ACUITY_SCORE: 23
ADLS_ACUITY_SCORE: 23
ADLS_ACUITY_SCORE: 25
ADLS_ACUITY_SCORE: 23
ADLS_ACUITY_SCORE: 23
ADLS_ACUITY_SCORE: 21
ADLS_ACUITY_SCORE: 21
ADLS_ACUITY_SCORE: 19

## 2024-02-08 NOTE — PROGRESS NOTES
[facility]    Medicine Progress Note - Hospitalist Service    Date of Admission:  2/7/2024    Assessment & Plan     Mahad Ward is a 78 year old male admitted on 2/7/2024. He has a past medical history significant for COPD, chronic hypoxic respiratory failure.  Presented to emergency room with worsening shortness of breath.  Found to have COVID-19 infection and possible secondary bacterial pneumonia.     COVID-19 infection.  Possible secondary bacterial pneumonia.  Acute on chronic hypoxic and hypercapnic respiratory failure.  COPD exacerbation.  -Supplemental oxygen as needed.  -Initially had azithromycin and ceftriaxone in the ER.  -Continue antibiotics with azithromycin and IV Zosyn.  -Continue dexamethasone 6 mg a day.  -Continue for length of hospital stay IV remdesivir for 5-day course.  -Scheduled DuoNebs 4 times a day.  -Continue inhaled fluticasone/vilanterol.  -Additional albuterol if needed.  -Recheck CRP tomorrow.     Hypomagnesemia.  Hypocalcemia.  -Magnesium replacement protocol.  -Ionized calcium okay.     Right pulmonary emboli.  -Possibly chronic based on imaging.  -Continue IV heparin drip.  -Likely transition to DOAC at discharge.  -Pharmacy liaison consult for pricing on DOAC.     Mild troponin elevation.  New diagnosis atrial fibrillation.  -Suspect mild troponin elevation is a type II myocardial infarction due to demand ischemia.  -Repeat troponin unchanged.  -Monitor on telemetry.  -Heparin drip as above.  -Aspirin 81 mg a day.  -Echocardiogram with EF 60 to 65% and no regional WMA's.  -Blood pressure a bit low at times in the ER.  -Heart rate currently under much better control.  -Plan for cardiac stress test in the outpatient setting after acute issues completely resolved.  -Will be discharging on anticoagulation due to above-noted pulmonary emboli.  -Has not required rate control to this point.  -Plan to discharge on event monitor versus Zio patch.      Incidentally noted cholelithiasis.  -Follow-up as outpatient after acute issues resolved.          Diet: Combination Diet Regular Diet Adult    DVT Prophylaxis: Heparin   Gonzalez Catheter: Not present  Lines: None     Cardiac Monitoring: ACTIVE order. Indication: Tachyarrhythmias, acute (48 hours)  Code Status: Full Code      Clinically Significant Risk Factors Present on Admission          # Hypocalcemia: Lowest Ca = 8 mg/dL in last 2 days, will monitor and replace as appropriate   # Hypomagnesemia: Lowest Mg = 1.6 mg/dL in last 2 days, will replace as needed   # Hypoalbuminemia: Lowest albumin = 3.4 g/dL at 2/8/2024  7:10 AM, will monitor as appropriate                     Disposition Plan      Expected Discharge Date: 02/09/2024                    Hermelindo Sorenson DO  Hospitalist Service  [facility]  Securely message with [employer] (more info)  Text page via [facility] Paging/Directory   ______________________________________________________________________    Interval History   Short of breath.  Occasional cough.  No chest pain.  Headache much improved.  Body aches improved.  Did feel feverish at times overnight.  No nausea or vomiting.    Physical Exam   Vital Signs: Temp: 97.2  F (36.2  C) Temp src: Axillary BP: 109/67 Pulse: 69   Resp: 20 SpO2: 98 % O2 Device: Nasal cannula Oxygen Delivery: 3 LPM  Weight: 167 lbs 14.4 oz    Gen:  NAD, A&Ox3.  Eyes:  PERRL, sclera anicteric.  OP:  MMM, no lesions.  Neck:  Supple.  CV: Fairly regular, no murmurs.  Lung:  CTA b/l today, normal effort.  Ab:  +BS, soft.  Skin:  Warm, dry to touch.  No rash.  Ext:  No pitting edema LE b/l.      Medical Decision Making       45 MINUTES SPENT BY ME on the date of service doing chart review, history, exam, documentation & further activities per the note.      Data     I have personally reviewed the following data over the past 24 hrs:    5.9  \   11.0 (L)   / 189     140 105 13.6 /  151 (H)   4.2 24 0.87 \     ALT:  14 AST: 30 AP: 44 TBILI: 0.3   ALB: 3.4 (L) TOT PROTEIN: 6.5 LIPASE: N/A     Trop: 25 (H) BNP: 2,977 (H)     Procal: N/A CRP: 130.92 (H) Lactic Acid: N/A       INR:  N/A PTT:  N/A   D-dimer:  1.52 (H) Fibrinogen:  N/A       Imaging results reviewed over the past 24 hrs:   Recent Results (from the past 24 hour(s))   XR Chest 2 Views    Narrative    EXAM: XR CHEST 2 VIEWS  LOCATION: [facility]  DATE: 2/7/2024    INDICATION: Shortness of breath.  COMPARISON: Chest radiograph 11/25/2018.      Impression    IMPRESSION:     Emphysematous, hyperinflated lungs, suggestive of COPD.    Small hazy airspace opacity in the lateral right lower lung appears similar to prior and is favored to reflect chronic changes from parenchymal scarring/fibrosis, although a superimposed infection is not excluded.    Remainder of the lungs appear similar, with biapical scarring and scattered strands of fibrosis. No pleural effusions. No pneumothorax. Stable cardiomediastinal silhouette.   CT Chest Pulmonary Embolism w Contrast    Narrative    EXAM: CT CHEST PULMONARY EMBOLISM W CONTRAST  LOCATION: [facility]  DATE: 2/7/2024    INDICATION: shortness of breath, elevated d dimer  COMPARISON: 07/31/2018  TECHNIQUE: CT chest pulmonary angiogram during arterial phase injection of IV contrast. Multiplanar reformats and MIP reconstructions were performed. Dose reduction techniques were used.   CONTRAST: 70 mL Isovue 370    FINDINGS:  ANGIOGRAM CHEST: There is a single chronic appearing segmental pulmonary embolus in the right upper lobe. There is a single chronic appearing segmental embolus within the superior segment right lower lobe. No left sided PE. Pulmonary arteries normal in   caliber. Thoracic aorta normal in caliber. No aortic dissection.    HEART: Cardiac chambers within normal limits. No pericardial effusion. No coronary artery calcification.    MEDIASTINUM: No adenopathy or mass.    LUNGS AND  PLEURA: Moderate emphysema. Severe bronchitis and bronchiolitis, greatest within the basal right lower lobe and inferior aspect of the right middle lobe. Mild subsegmental consolidation within the lateral right lower lobe. Trace bilateral   pleural effusions.    LIMITED UPPER ABDOMEN: Multiple minimally calcified gallstones, largest measuring 2 cm.    MUSCULOSKELETAL: Negative.      Impression    IMPRESSION:  1.  There are 2 small chronic appearing segmental pulmonary emboli within the right lung. No left-sided emboli. No evidence for elevated right heart pressure.  2.  Severe chronic/recurrent bronchitis and bronchiolitis with small area of pneumonia in the right lower lobe laterally.  3.  Moderate emphysema.  4.  Cholelithiasis.   Echocardiogram Complete   Result Value    LVEF  55-60%    Narrative    [id number]  [id number]  [id number]  [id number]^[id number]^REYNA^PETRA     [facility]  Echocardiography Laboratory  [address] [place] [address]     Name: GREGOR COHEN  MRN: [id number]  : 1945  Study Date: 2024 08:27 AM  Age: 78 yrs  Gender: Male  Patient Location: [facility]  Reason For Study: Atrial Fibrillation  Ordering Physician: REYNA ALMARAZ  Performed By: Luis Martel RDCS     BSA: 1.9 m2  Height: 70 in  Weight: 167 lb  HR: 67  BP: 119/68 mmHg  ______________________________________________________________________________  Procedure  Complete Echo Adult.  ______________________________________________________________________________  Interpretation Summary     Left ventricular systolic function is normal.  The visual ejection fraction is 60-65%.  Left ventricular diastolic function is normal.  No regional wall motion abnormalities.  Normal right ventricular size and systolic function.  No significant valve disease.  Normal left atrial size.  Dilated inferior vena cava with normal respiratory variation.      ______________________________________________________________________________  Left Ventricle  The left ventricle is normal in size. There is normal left ventricular wall  thickness. Left ventricular systolic function is normal. The visual ejection  fraction is 55-60%. Grade I or early diastolic dysfunction. No regional wall  motion abnormalities noted.     Right Ventricle  The right ventricle is normal in size and function.     Atria  Normal left atrial size. Right atrial size is normal. There is no atrial shunt  seen.     Mitral Valve  The mitral valve leaflets appear normal. There is no evidence of stenosis,  fluttering, or prolapse. There is trace mitral regurgitation. There is no  mitral valve stenosis.     Tricuspid Valve  Normal tricuspid valve. There is trace tricuspid regurgitation. Right  ventricular systolic pressure could not be approximated due to inadequate  tricuspid regurgitation.     Aortic Valve  There is mild trileaflet aortic sclerosis. No aortic regurgitation is present.  No aortic stenosis is present.     Pulmonic Valve  The pulmonic valve is not well seen, but is grossly normal. There is trace  pulmonic valvular regurgitation. Normal pulmonic valve velocity.     Vessels  The aortic root is normal size. Normal size ascending aorta. Dilation of the  inferior vena cava is present with normal respiratory variation in diameter.     Pericardium  There is no pericardial effusion.     Rhythm  Sinus rhythm was noted.  ______________________________________________________________________________  MMode/2D Measurements & Calculations     IVSd: 1.1 cm  LVIDd: 5.3 cm  LVIDs: 4.3 cm  LVPWd: 0.82 cm  IVC diam: 2.6 cm  FS: 19.3 %  LV mass(C)d: 185.7 grams  LV mass(C)dI: 96.0 grams/m2  Ao root diam: 3.9 cm  asc Aorta Diam: 3.2 cm  LVOT diam: 2.2 cm  LVOT area: 4.0 cm2  Ao root diam index Ht(cm/m): 2.2  Ao root diam index BSA (cm/m2): 2.0  Asc Ao diam index BSA (cm/m2): 1.7  Asc Ao diam index Ht(cm/m):  1.8  LA Volume (BP): 47.1 ml     LA Volume Index (BP): 24.4 ml/m2  RWT: 0.31  TAPSE: 2.2 cm     Time Measurements  Aortic HR: 72.0 BPM     Doppler Measurements & Calculations  MV E max warren: 70.7 cm/sec  MV A max warren: 87.0 cm/sec  MV E/A: 0.81  MV max PG: 3.3 mmHg  MV mean P.6 mmHg  MV V2 VTI: 22.0 cm  MVA(VTI): 3.9 cm2  MV dec time: 0.23 sec  LV V1 max PG: 3.5 mmHg  LV V1 max: 93.3 cm/sec  LV V1 VTI: 21.4 cm  CO(LVOT): 6.1 l/min  CI(LVOT): 3.1 l/min/m2  SV(LVOT): 84.6 ml  SI(LVOT): 43.7 ml/m2  PA acc time: 0.12 sec     E/E' av.4  Lateral E/e': 10.0  Medial E/e': 8.8  RV S Warren: 14.3 cm/sec     ______________________________________________________________________________  Report approved by: Dr Parveen Azul 2024 10:01 AM

## 2024-02-08 NOTE — CONSULTS
Patient has Medicare Advantage through [employer].    Eliquis/Xarelto  --$47/mo.  --lf/when total drug costs exceed $5,030, price will increase to a 25% coinsurance, equivalent to $143/mo.    Corrina Perez  Pharmacy Technician/Liaison, Discharge Pharmacy   [phone] (voice or text)  [email or web address]

## 2024-02-08 NOTE — PROGRESS NOTES
"Formerly [facility] RCAT     Date: 2/8/2024  Admission Dx: COPD exacerbation, COVID +  Pulmonary History: COPD  Home Nebulizer/MDI Use: taking Combivent QID per pharmacy note  Home Oxygen: 2-4 lpm NC.   Acuity Level (RCAT flow sheet): 3   Aerosol Therapy initiated: combivent QID,  Breo every day.       Pulmonary Hygiene initiated: cough and deep breathing       Volume Expansion initiated: IS      Current Oxygen Requirements: 3 lpm NC  Current SpO2: 98%    Re-evaluation date: 2/11/2024    Patient Education: indications and benefits of bronchodilators.       See \"RT Assessments\" flow sheet for patient assessment scoring and Acuity Level Details.           "

## 2024-02-08 NOTE — ED NOTES
"Patient and family have various complaints when writer answered call light. Patient requesting to leave, upset due to long wait time, family states \"we have been here for hours and hasn't had anything to eat or drink, we requested water over an hour ago\".  Patient offered a boxed meal which he declined. Patient brought ice water.   "

## 2024-02-08 NOTE — PHARMACY-ADMISSION MEDICATION HISTORY
Pharmacist Admission Medication History    Admission medication history is complete. The information provided in this note is only as accurate as the sources available at the time of the update.    Information Source(s): Patient via phone    Pertinent Information:     Changes made to PTA medication list:  Added: Famotidine, Rosuvastatin, Azelastine   Deleted: Zithromax  Changed: None    Allergies reviewed with patient and updates made in EHR: no    Medication History Completed By: Jerardo Reis RPH 2/7/2024 11:24 PM    Prior to Admission medications    Medication Sig Last Dose Taking? Auth Provider Long Term End Date   acetaminophen-caffeine (EXCEDRIN TENSION HEADACHE) 500-65 MG TABS Take 2 tablets by mouth every 6 hours as needed for mild pain  Yes Unknown, Entered By History     azelastine (ASTELIN) 0.1 % nasal spray Spray 1 spray into both nostrils 2 times daily 2/5/2024 Yes Unknown, Entered By History     Cholecalciferol (VITAMIN D3) 2000 units TABS Take 2,000 Units by mouth every morning  Yes Unknown, Entered By History     famotidine (PEPCID) 40 MG tablet Take 40 mg by mouth daily 2/7/2024 at am Yes Unknown, Entered By History     Fluticasone-Salmeterol (AIRDUO RESPICLICK) 113-14 MCG/ACT AEPB inhaler Inhale 1 puff into the lungs 2 times daily Rinse mouth after use. 2/6/2024 Yes Unknown, Entered By History Yes    Ipratropium-Albuterol (COMBIVENT RESPIMAT)  MCG/ACT inhaler Inhale 1 puff into the lungs every 4 hours Per pt, using 4 to 5 times/day 2/7/2024 at am Yes Reported, Patient Yes    Multiple Vitamins-Minerals (CENTRUM SILVER) per tablet Take 1 tablet by mouth daily  Yes Reported, Patient     rosuvastatin (CRESTOR) 10 MG tablet Take 10 mg by mouth at bedtime 2/5/2024 Yes Unknown, Entered By History Yes

## 2024-02-08 NOTE — H&P
[facility]    History and Physical - Hospitalist Service       Date of Admission:  2/7/2024    Assessment & Plan      Mahad Ward is a 78 year old male admitted on 2/7/2024. He has a past medical history significant for COPD, chronic hypoxic respiratory failure.  Presented to emergency room with worsening shortness of breath.  Found to have COVID-19 infection and possible secondary bacterial pneumonia.    COVID-19 infection.  Possible secondary bacterial pneumonia.  Acute on chronic hypoxic and hypercapnic respiratory failure.  COPD exacerbation.  -Supplemental oxygen as needed.  -Initially had azithromycin and ceftriaxone in the ER.  -Continue antibiotics with azithromycin and IV Zosyn.  -Start dexamethasone 6 mg a day.  -Start IV remdesivir for 5-day course.  -Scheduled DuoNebs 4 times a day.  -Continue inhaled fluticasone/vilanterol.  -Additional albuterol if needed.    Hypomagnesemia.  Hypocalcemia.  -Magnesium replacement protocol.  -Check ionized calcium.    Right pulmonary emboli.  -Possibly chronic based on imaging.  -Start IV heparin drip.  -Likely transition to DOAC at discharge.    Mild troponin elevation.  New diagnosis atrial fibrillation.  -Suspect mild troponin elevation is a type II myocardial infarction due to demand ischemia.  -Repeat troponin unchanged.  -Monitor on telemetry.  -Heparin drip as above.  -Aspirin 81 mg a day.  -Check echocardiogram.  -Blood pressure a bit low at times in the ER.  -Heart rate currently under much better control.    Incidentally noted cholelithiasis.  -Follow-up as outpatient after acute issues resolved.        Diet:  Regular diet.  DVT Prophylaxis: Heparin   Gonzalez Catheter: Not present  Lines: None     Cardiac Monitoring: None  Code Status:  Full code.    Clinically Significant Risk Factors Present on Admission            # Hypomagnesemia: Lowest Mg = 1.6 mg/dL in last 2 days, will replace as needed                       Disposition Plan       Expected Discharge Date: 02/09/2024                  Hermelindo Sorenson DO  Hospitalist Service  [facility]  Securely message with [employer] (more info)  Text page via [employer] Paging/Directory     ______________________________________________________________________    Chief Complaint   Shortness of breath.    History is obtained from the patient    History of Present Illness   Mahad Ward is a 78 year old male who has a past medical history significant for COPD and chronic hypoxic respiratory failure.  Normally on between 2 and 4 L of oxygen per minute at home.  Feeling more short of breath for the past 2 days.  Wife tested positive for COVID-19 2 days ago.  She is currently hospitalized at another facility.  He has noted an increased cough.  Cough is productive of a yellow sputum.  Has had some fevers and chills.  Has a headache.  A few generalized bodyaches.  Nothing at home seem to be helping symptoms.  Presented to emergency room for further evaluation.  Feeling better after medications given in the ER.      Past Medical History    Past Medical History:   Diagnosis Date    BPH (benign prostatic hyperplasia)     COPD (chronic obstructive pulmonary disease) (H)     Prostate enlargement        Past Surgical History   Past Surgical History:   Procedure Laterality Date    PROSTATE SURGERY         Prior to Admission Medications   Prior to Admission Medications   Prescriptions Last Dose Informant Patient Reported? Taking?   Cholecalciferol (VITAMIN D3) 2000 units TABS   Yes No   Sig: Take 2,000 Units by mouth every morning   Fluticasone-Salmeterol (AIRDUO RESPICLICK) 113-14 MCG/ACT AEPB inhaler   Yes No   Sig: Inhale 1 puff into the lungs 2 times daily   Ipratropium-Albuterol (COMBIVENT RESPIMAT)  MCG/ACT inhaler   Yes No   Sig: Inhale 1 puff into the lungs every 4 hours    Multiple Vitamins-Minerals (CENTRUM SILVER) per tablet   Yes No   Sig: Take 1 tablet by mouth daily    acetaminophen-caffeine (EXCEDRIN TENSION HEADACHE) 500-65 MG TABS   Yes No   Sig: Take 2 tablets by mouth every 6 hours as needed for mild pain   azithromycin (ZITHROMAX) 250 MG tablet   No No   Sig: Take 1 tablet (250 mg) by mouth daily      Facility-Administered Medications: None        Allergies   Allergies   Allergen Reactions    Levaquin [Levofloxacin]         Physical Exam   Vital Signs: Temp: 97.8  F (36.6  C) Temp src: Temporal BP: 119/68 Pulse: 76   Resp: 26 SpO2: 97 % O2 Device: Nasal cannula Oxygen Delivery: 4 LPM  Weight: 0 lbs 0 oz    Gen:  NAD, A&Ox3.  Eyes:  PERRL, sclera anicteric.  OP:  MMM, no lesions.  Neck:  Supple.  CV: Fairly regular, no loud murmurs.  Lung: Wheeze on right, left lung clear, normal effort.  Ab:  +BS, soft.  Skin:  Warm, dry to touch.  No rash.  Ext:  No pitting edema LE b/l.      Medical Decision Making       80 MINUTES SPENT BY ME on the date of service doing chart review, history, exam, documentation & further activities per the note.      Data     I have personally reviewed the following data over the past 24 hrs:    6.8  \   11.9 (L)   / 215     141 105 15.5 /  123 (H)   4.0 29 1.10 \     ALT: 17 AST: 33 AP: 44 TBILI: 0.4   ALB: 3.5 TOT PROTEIN: 6.5 LIPASE: N/A     Trop: 25 (H) BNP: 2,977 (H)     Procal: N/A CRP: 130.92 (H) Lactic Acid: N/A       INR:  N/A PTT:  N/A   D-dimer:  1.52 (H) Fibrinogen:  N/A       Imaging results reviewed over the past 24 hrs:   Recent Results (from the past 24 hour(s))   XR Chest 2 Views    Narrative    EXAM: XR CHEST 2 VIEWS  LOCATION: [facility]  DATE: 2/7/2024    INDICATION: Shortness of breath.  COMPARISON: Chest radiograph 11/25/2018.      Impression    IMPRESSION:     Emphysematous, hyperinflated lungs, suggestive of COPD.    Small hazy airspace opacity in the lateral right lower lung appears similar to prior and is favored to reflect chronic changes from parenchymal scarring/fibrosis, although a superimposed  infection is not excluded.    Remainder of the lungs appear similar, with biapical scarring and scattered strands of fibrosis. No pleural effusions. No pneumothorax. Stable cardiomediastinal silhouette.   CT Chest Pulmonary Embolism w Contrast    Narrative    EXAM: CT CHEST PULMONARY EMBOLISM W CONTRAST  LOCATION: [facility]  DATE: 2/7/2024    INDICATION: shortness of breath, elevated d dimer  COMPARISON: 07/31/2018  TECHNIQUE: CT chest pulmonary angiogram during arterial phase injection of IV contrast. Multiplanar reformats and MIP reconstructions were performed. Dose reduction techniques were used.   CONTRAST: 70 mL Isovue 370    FINDINGS:  ANGIOGRAM CHEST: There is a single chronic appearing segmental pulmonary embolus in the right upper lobe. There is a single chronic appearing segmental embolus within the superior segment right lower lobe. No left sided PE. Pulmonary arteries normal in   caliber. Thoracic aorta normal in caliber. No aortic dissection.    HEART: Cardiac chambers within normal limits. No pericardial effusion. No coronary artery calcification.    MEDIASTINUM: No adenopathy or mass.    LUNGS AND PLEURA: Moderate emphysema. Severe bronchitis and bronchiolitis, greatest within the basal right lower lobe and inferior aspect of the right middle lobe. Mild subsegmental consolidation within the lateral right lower lobe. Trace bilateral   pleural effusions.    LIMITED UPPER ABDOMEN: Multiple minimally calcified gallstones, largest measuring 2 cm.    MUSCULOSKELETAL: Negative.      Impression    IMPRESSION:  1.  There are 2 small chronic appearing segmental pulmonary emboli within the right lung. No left-sided emboli. No evidence for elevated right heart pressure.  2.  Severe chronic/recurrent bronchitis and bronchiolitis with small area of pneumonia in the right lower lobe laterally.  3.  Moderate emphysema.  4.  Cholelithiasis.

## 2024-02-08 NOTE — ED NOTES
[facility]  ED Nurse Handoff Report    ED Chief complaint: Shortness of Breath  . ED Diagnosis:   Final diagnoses:   Atrial fibrillation with RVR (H)   Hypomagnesemia   Community acquired pneumonia of right lower lobe of lung   Pulmonary embolism (H)   History of COPD   COVID-19       Allergies:   Allergies   Allergen Reactions    Levaquin [Levofloxacin]        Code Status: Full Code    Activity level - Baseline/Home:  standby.  Activity Level - Current:   standby.   Lift room needed: No.   Bariatric: No   Needed: No   Isolation: Yes.   Infection: Not Applicable  COVID r/o and special precautions.     Respiratory status: Room air    Vital Signs (within 30 minutes):   Vitals:    02/07/24 1919 02/07/24 1920 02/07/24 1921 02/07/24 1922   BP:       Pulse: 77 77 76    Resp:       Temp:       TempSrc:       SpO2: 99% 97% 99% 97%   Height:           Cardiac Rhythm:  ,   Cardiac  Cardiac Rhythm: Atrial fibrillation  Pain level:    Patient confused: No.   Patient Falls Risk: arm band in place.   Elimination Status: Has voided     Patient Report - Initial Complaint: Patient arrived for increased hypotension and Tachycardia.   Focused Assessment: Patient arrived to ED via private car from [facility] with hypotension and tachycardia. Patient is Covid + and has a hx of COPD.  Patient reports SOB and has a noted congested cough. Patient on 3 liters of O2     Abnormal Results:   Labs Ordered and Resulted from Time of ED Arrival to Time of ED Departure   BASIC METABOLIC PANEL - Abnormal       Result Value    Sodium 141      Potassium 4.0      Chloride 105      Carbon Dioxide (CO2) 29      Anion Gap 7      Urea Nitrogen 15.5      Creatinine 1.10      GFR Estimate 68      Calcium 8.7 (*)     Glucose 123 (*)    D DIMER QUANTITATIVE - Abnormal    D-Dimer Quantitative 1.52 (*)    NT PROBNP INPATIENT - Abnormal    N terminal Pro BNP Inpatient 2,977 (*)    INFLUENZA A/B, RSV, & SARS-COV2 PCR - Abnormal     Influenza A PCR Negative      Influenza B PCR Negative      RSV PCR Negative      SARS CoV2 PCR Positive (*)    TROPONIN T, HIGH SENSITIVITY - Abnormal    Troponin T, High Sensitivity 25 (*)    MAGNESIUM - Abnormal    Magnesium 1.6 (*)    CBC WITH PLATELETS AND DIFFERENTIAL - Abnormal    WBC Count 6.8      RBC Count 3.96 (*)     Hemoglobin 11.9 (*)     Hematocrit 37.6 (*)     MCV 95      MCH 30.1      MCHC 31.6      RDW 13.7      Platelet Count 215      % Neutrophils 80      % Lymphocytes 12      % Monocytes 8      % Eosinophils 0      % Basophils 0      % Immature Granulocytes 0      NRBCs per 100 WBC 0      Absolute Neutrophils 5.4      Absolute Lymphocytes 0.8      Absolute Monocytes 0.5      Absolute Eosinophils 0.0      Absolute Basophils 0.0      Absolute Immature Granulocytes 0.0      Absolute NRBCs 0.0     BLOOD GAS VENOUS - Abnormal    pH Venous 7.34      pCO2 Venous 55 (*)     pO2 Venous 26      Bicarbonate Venous 30 (*)     Base Excess/Deficit Venous 3.2 (*)     FIO2 4      Oxyhemoglobin Venous 43 (*)     O2 Sat, Venous 43.3 (*)    TROPONIN T, HIGH SENSITIVITY - Abnormal    Troponin T, High Sensitivity 25 (*)    HEPATIC FUNCTION PANEL   CRP INFLAMMATION        CT Chest Pulmonary Embolism w Contrast   Final Result   IMPRESSION:   1.  There are 2 small chronic appearing segmental pulmonary emboli within the right lung. No left-sided emboli. No evidence for elevated right heart pressure.   2.  Severe chronic/recurrent bronchitis and bronchiolitis with small area of pneumonia in the right lower lobe laterally.   3.  Moderate emphysema.   4.  Cholelithiasis.      XR Chest 2 Views   Final Result   IMPRESSION:       Emphysematous, hyperinflated lungs, suggestive of COPD.      Small hazy airspace opacity in the lateral right lower lung appears similar to prior and is favored to reflect chronic changes from parenchymal scarring/fibrosis, although a superimposed infection is not excluded.      Remainder of the  lungs appear similar, with biapical scarring and scattered strands of fibrosis. No pleural effusions. No pneumothorax. Stable cardiomediastinal silhouette.          Treatments provided: Medications  Family Comments: Daughter present  OBS brochure/video discussed/provided to patient:  N/A  ED Medications:   Medications   cefTRIAXone (ROCEPHIN) 1 g vial to attach to  mL bag for ADULTS or NS 50 mL bag for PEDS (1 g Intravenous $New Bag 2/7/24 2145)   azithromycin 500 mg (ZITHROMAX) in 0.9% NaCl 250 mL intermittent infusion 500 mg (has no administration in time range)   sodium chloride 0.9% BOLUS 500 mL (0 mLs Intravenous Stopped 2/7/24 1743)   magnesium sulfate 2 g in 50 mL sterile water intermittent infusion (0 g Intravenous Stopped 2/7/24 1946)   sodium chloride 0.9% BOLUS 250 mL (0 mLs Intravenous Stopped 2/7/24 1946)   sodium chloride (PF) 0.9% PF flush 100 mL (90 mLs Intravenous $Given 2/7/24 1959)   iopamidol (ISOVUE-370) solution 500 mL (70 mLs Intravenous $Given 2/7/24 1959)       Drips infusing:  Yes  For the majority of the shift this patient was Green.   Interventions performed were None.    Sepsis treatment initiated: No    Cares/treatment/interventions/medications to be completed following ED care: None    ED Nurse Name: Kavya Pino RN  9:52 PM         RECEIVING UNIT ED HANDOFF REVIEW    Above ED Nurse Handoff Report was reviewed: Yes  Reviewed by: Rere Restrepo RN on February 7, 2024 at 10:19 PM

## 2024-02-08 NOTE — PLAN OF CARE
Goal Outcome Evaluation:      Plan of Care Reviewed With: patient    Temp: 98  F (36.7  C) Temp src: Temporal BP: 122/62 Pulse: 82   Resp: 20 SpO2: 95 % O2 Device: Nasal cannula Oxygen Delivery: 3 LPM     VSS on 3L NC, which is baseline. A&O x4. Assist of 1, GB. On tele, in SR with PVCs. On airborne precautions for covid. Denies pain. L. PIV x2. L. PIV on forearm infusing with heparin, at 1550 units/hr. Next xa at 0705. Upper L. PIV SL. On zosyn q6 and PO azithromycin. L. PIV x2. On K and Mg protocols, AM recheck. PRN robitussin given. Plan to continue antibiotics.               no

## 2024-02-09 LAB
ANION GAP SERPL CALCULATED.3IONS-SCNC: 11 MMOL/L (ref 7–15)
BUN SERPL-MCNC: 17.4 MG/DL (ref 8–23)
CALCIUM SERPL-MCNC: 8.5 MG/DL (ref 8.8–10.2)
CHLORIDE SERPL-SCNC: 107 MMOL/L (ref 98–107)
CREAT SERPL-MCNC: 0.82 MG/DL (ref 0.67–1.17)
DEPRECATED HCO3 PLAS-SCNC: 24 MMOL/L (ref 22–29)
EGFRCR SERPLBLD CKD-EPI 2021: 89 ML/MIN/1.73M2
GLUCOSE SERPL-MCNC: 126 MG/DL (ref 70–99)
MAGNESIUM SERPL-MCNC: 2 MG/DL (ref 1.7–2.3)
MAGNESIUM SERPL-MCNC: 2.1 MG/DL (ref 1.7–2.3)
POTASSIUM SERPL-SCNC: 4.1 MMOL/L (ref 3.4–5.3)
POTASSIUM SERPL-SCNC: 4.2 MMOL/L (ref 3.4–5.3)
POTASSIUM SERPL-SCNC: 4.2 MMOL/L (ref 3.4–5.3)
SODIUM SERPL-SCNC: 142 MMOL/L (ref 135–145)
UFH PPP CHRO-ACNC: 0.57 IU/ML
UFH PPP CHRO-ACNC: 1 IU/ML

## 2024-02-09 PROCEDURE — 250N000013 HC RX MED GY IP 250 OP 250 PS 637: Performed by: INTERNAL MEDICINE

## 2024-02-09 PROCEDURE — 83735 ASSAY OF MAGNESIUM: CPT | Performed by: INTERNAL MEDICINE

## 2024-02-09 PROCEDURE — 250N000013 HC RX MED GY IP 250 OP 250 PS 637: Performed by: STUDENT IN AN ORGANIZED HEALTH CARE EDUCATION/TRAINING PROGRAM

## 2024-02-09 PROCEDURE — 120N000001 HC R&B MED SURG/OB

## 2024-02-09 PROCEDURE — 99232 SBSQ HOSP IP/OBS MODERATE 35: CPT | Performed by: STUDENT IN AN ORGANIZED HEALTH CARE EDUCATION/TRAINING PROGRAM

## 2024-02-09 PROCEDURE — 84132 ASSAY OF SERUM POTASSIUM: CPT | Performed by: STUDENT IN AN ORGANIZED HEALTH CARE EDUCATION/TRAINING PROGRAM

## 2024-02-09 PROCEDURE — 80048 BASIC METABOLIC PNL TOTAL CA: CPT | Performed by: STUDENT IN AN ORGANIZED HEALTH CARE EDUCATION/TRAINING PROGRAM

## 2024-02-09 PROCEDURE — 250N000011 HC RX IP 250 OP 636: Performed by: INTERNAL MEDICINE

## 2024-02-09 PROCEDURE — 94640 AIRWAY INHALATION TREATMENT: CPT | Mod: 76

## 2024-02-09 PROCEDURE — 83735 ASSAY OF MAGNESIUM: CPT | Performed by: STUDENT IN AN ORGANIZED HEALTH CARE EDUCATION/TRAINING PROGRAM

## 2024-02-09 PROCEDURE — 94640 AIRWAY INHALATION TREATMENT: CPT

## 2024-02-09 PROCEDURE — 85520 HEPARIN ASSAY: CPT | Performed by: STUDENT IN AN ORGANIZED HEALTH CARE EDUCATION/TRAINING PROGRAM

## 2024-02-09 PROCEDURE — 250N000011 HC RX IP 250 OP 636: Performed by: STUDENT IN AN ORGANIZED HEALTH CARE EDUCATION/TRAINING PROGRAM

## 2024-02-09 PROCEDURE — 85520 HEPARIN ASSAY: CPT | Performed by: INTERNAL MEDICINE

## 2024-02-09 PROCEDURE — 36415 COLL VENOUS BLD VENIPUNCTURE: CPT | Performed by: STUDENT IN AN ORGANIZED HEALTH CARE EDUCATION/TRAINING PROGRAM

## 2024-02-09 PROCEDURE — 999N000157 HC STATISTIC RCP TIME EA 10 MIN

## 2024-02-09 PROCEDURE — 250N000012 HC RX MED GY IP 250 OP 636 PS 637: Performed by: INTERNAL MEDICINE

## 2024-02-09 PROCEDURE — 258N000003 HC RX IP 258 OP 636: Performed by: INTERNAL MEDICINE

## 2024-02-09 PROCEDURE — 80048 BASIC METABOLIC PNL TOTAL CA: CPT | Performed by: INTERNAL MEDICINE

## 2024-02-09 PROCEDURE — 36415 COLL VENOUS BLD VENIPUNCTURE: CPT | Performed by: INTERNAL MEDICINE

## 2024-02-09 PROCEDURE — 250N000009 HC RX 250: Performed by: INTERNAL MEDICINE

## 2024-02-09 RX ORDER — AZITHROMYCIN 250 MG/1
250 TABLET, FILM COATED ORAL DAILY
Qty: 4 TABLET | Refills: 0 | Status: SHIPPED | OUTPATIENT
Start: 2024-02-10 | End: 2024-02-14

## 2024-02-09 RX ORDER — HEPARIN SODIUM 10000 [USP'U]/100ML
0-5000 INJECTION, SOLUTION INTRAVENOUS CONTINUOUS
Status: DISPENSED | OUTPATIENT
Start: 2024-02-09 | End: 2024-02-09

## 2024-02-09 RX ORDER — APIXABAN 5 MG (74)
KIT ORAL
Qty: 74 EACH | Refills: 0 | Status: SHIPPED | OUTPATIENT
Start: 2024-02-09 | End: 2024-03-10

## 2024-02-09 RX ORDER — FUROSEMIDE 10 MG/ML
40 INJECTION INTRAMUSCULAR; INTRAVENOUS ONCE
Status: COMPLETED | OUTPATIENT
Start: 2024-02-09 | End: 2024-02-09

## 2024-02-09 RX ORDER — CEFUROXIME AXETIL 500 MG/1
500 TABLET ORAL 2 TIMES DAILY
Qty: 14 TABLET | Refills: 0 | Status: SHIPPED | OUTPATIENT
Start: 2024-02-09 | End: 2024-02-16

## 2024-02-09 RX ORDER — DEXAMETHASONE 6 MG/1
6 TABLET ORAL DAILY
Qty: 8 TABLET | Refills: 0 | Status: SHIPPED | OUTPATIENT
Start: 2024-02-10

## 2024-02-09 RX ORDER — HYDROMORPHONE HYDROCHLORIDE 1 MG/ML
0.3 INJECTION, SOLUTION INTRAMUSCULAR; INTRAVENOUS; SUBCUTANEOUS
Status: DISCONTINUED | OUTPATIENT
Start: 2024-02-09 | End: 2024-02-10 | Stop reason: HOSPADM

## 2024-02-09 RX ADMIN — FUROSEMIDE 40 MG: 10 INJECTION, SOLUTION INTRAMUSCULAR; INTRAVENOUS at 15:17

## 2024-02-09 RX ADMIN — DEXAMETHASONE 6 MG: 2 TABLET ORAL at 13:56

## 2024-02-09 RX ADMIN — ACETAMINOPHEN 650 MG: 325 TABLET, FILM COATED ORAL at 02:00

## 2024-02-09 RX ADMIN — PIPERACILLIN AND TAZOBACTAM 4.5 G: 4; .5 INJECTION, POWDER, FOR SOLUTION INTRAVENOUS at 09:03

## 2024-02-09 RX ADMIN — AZELASTINE 1 SPRAY: 1 SPRAY, METERED NASAL at 08:49

## 2024-02-09 RX ADMIN — FAMOTIDINE 40 MG: 20 TABLET ORAL at 08:47

## 2024-02-09 RX ADMIN — PIPERACILLIN AND TAZOBACTAM 4.5 G: 4; .5 INJECTION, POWDER, FOR SOLUTION INTRAVENOUS at 13:55

## 2024-02-09 RX ADMIN — Medication 1 TABLET: at 08:47

## 2024-02-09 RX ADMIN — ROSUVASTATIN 10 MG: 10 TABLET, FILM COATED ORAL at 22:20

## 2024-02-09 RX ADMIN — PIPERACILLIN AND TAZOBACTAM 4.5 G: 4; .5 INJECTION, POWDER, FOR SOLUTION INTRAVENOUS at 20:53

## 2024-02-09 RX ADMIN — FLUTICASONE FUROATE AND VILANTEROL TRIFENATATE 1 PUFF: 100; 25 POWDER RESPIRATORY (INHALATION) at 07:52

## 2024-02-09 RX ADMIN — SODIUM CHLORIDE 50 ML: 9 INJECTION, SOLUTION INTRAVENOUS at 22:21

## 2024-02-09 RX ADMIN — ASPIRIN 81 MG: 81 TABLET, COATED ORAL at 08:47

## 2024-02-09 RX ADMIN — IPRATROPIUM BROMIDE AND ALBUTEROL 1 PUFF: 20; 100 SPRAY, METERED RESPIRATORY (INHALATION) at 11:54

## 2024-02-09 RX ADMIN — IPRATROPIUM BROMIDE AND ALBUTEROL 1 PUFF: 20; 100 SPRAY, METERED RESPIRATORY (INHALATION) at 20:58

## 2024-02-09 RX ADMIN — HEPARIN SODIUM 1350 UNITS/HR: 10000 INJECTION, SOLUTION INTRAVENOUS at 08:44

## 2024-02-09 RX ADMIN — IPRATROPIUM BROMIDE AND ALBUTEROL 1 PUFF: 20; 100 SPRAY, METERED RESPIRATORY (INHALATION) at 16:09

## 2024-02-09 RX ADMIN — HEPARIN SODIUM 1150 UNITS/HR: 10000 INJECTION, SOLUTION INTRAVENOUS at 09:52

## 2024-02-09 RX ADMIN — AZITHROMYCIN DIHYDRATE 250 MG: 250 TABLET ORAL at 08:47

## 2024-02-09 RX ADMIN — IPRATROPIUM BROMIDE AND ALBUTEROL 1 PUFF: 20; 100 SPRAY, METERED RESPIRATORY (INHALATION) at 07:52

## 2024-02-09 RX ADMIN — ALBUTEROL SULFATE 2.5 MG: 2.5 SOLUTION RESPIRATORY (INHALATION) at 02:00

## 2024-02-09 RX ADMIN — PIPERACILLIN AND TAZOBACTAM 4.5 G: 4; .5 INJECTION, POWDER, FOR SOLUTION INTRAVENOUS at 01:10

## 2024-02-09 RX ADMIN — REMDESIVIR 100 MG: 100 INJECTION, POWDER, LYOPHILIZED, FOR SOLUTION INTRAVENOUS at 22:21

## 2024-02-09 RX ADMIN — APIXABAN 10 MG: 5 TABLET, FILM COATED ORAL at 20:56

## 2024-02-09 ASSESSMENT — ACTIVITIES OF DAILY LIVING (ADL)
ADLS_ACUITY_SCORE: 25

## 2024-02-09 NOTE — PLAN OF CARE
VSS, afeb., LS are dim, 98% on 3L NC. Good appetite, ate 100% for dinner. Pt denies pain. Family at bedside most of shift, POC reviewed,pt and family questions and concerns answered.  HEP GTT  cont at 1350, recheck at 2230.  Cont IV Zosyn Pt will discharge on event monitor or Zio patch. Tele is SR w PVC's. Expected discharge to home in 1-2 days.

## 2024-02-09 NOTE — PROGRESS NOTES
[facility]    Medicine Progress Note - Hospitalist Service    Date of Admission:  2/7/2024  Date of Service: 02/09/2024    Assessment & Plan     Mahad Ward is a 78 year old male admitted on 2/7/2024. He has a past medical history significant for COPD, chronic hypoxic respiratory failure.  Presented to emergency room with worsening shortness of breath.  Found to have COVID-19 infection and possible secondary bacterial pneumonia.     COVID-19 infection.  Possible secondary bacterial pneumonia.  Acute on chronic hypoxic and hypercapnic respiratory failure.  COPD exacerbation.  -Supplemental oxygen as needed.  -Initially had azithromycin and ceftriaxone in the ER.  -Continue antibiotics with azithromycin and IV Zosyn.  -Continue dexamethasone 6 mg a day.  -Continue for length of hospital stay IV remdesivir for 5-day course.  -Scheduled DuoNebs 4 times a day.  -Continue inhaled fluticasone/vilanterol.  -Additional albuterol if needed.  -Recheck CRP tomorrow.     Hypomagnesemia.  Hypocalcemia.  -Magnesium replacement protocol.  -Ionized calcium okay.     Right pulmonary emboli.  -Possibly chronic based on imaging.  -Continue IV heparin drip -> DOAC tonight  -Pharmacy liaison consult for pricing on DOAC.     Mild troponin elevation.  New diagnosis atrial fibrillation.  -Suspect mild troponin elevation is a type II myocardial infarction due to demand ischemia.  -Repeat troponin unchanged.  -Monitor on telemetry.  -DOAC starting this evening  -Aspirin 81 mg a day.  -Echocardiogram with EF 60 to 65% and no regional WMA's.  -Blood pressure a bit low at times in the ER.  -Heart rate currently under much better control.  -Plan for cardiac stress test in the outpatient setting after acute issues completely resolved.  -Will be discharging on anticoagulation due to above-noted pulmonary emboli.  -Has not required rate control to this point.  -Plan to discharge on event monitor versus Zio patch.      Incidentally noted cholelithiasis.  -Follow-up as outpatient after acute issues resolved.          Diet: Combination Diet Regular Diet Adult    DVT Prophylaxis: Heparin   Gonzalez Catheter: Not present  Lines: None     Cardiac Monitoring: ACTIVE order. Indication: Tachyarrhythmias, acute (48 hours)  Code Status: Full Code      Clinically Significant Risk Factors          # Hypocalcemia: Lowest Ca = 8 mg/dL in last 2 days, will monitor and replace as appropriate   # Hypomagnesemia: Lowest Mg = 1.6 mg/dL in last 2 days, will replace as needed   # Hypoalbuminemia: Lowest albumin = 3.4 g/dL at 2/8/2024  7:10 AM, will monitor as appropriate                       Disposition Plan     Expected Discharge Date: 02/09/2024                    Jean-Claude Simpson MD  Hospitalist Service    Securely message with [employer] (more info)  Text page via [email or web address] Paging/Directory   ______________________________________________________________________    Interval History     No acute events overnight  No chest pain.  No nausea or vomiting.  No new SOB  No fevers  O2 needs are stable  No new complaints    Physical Exam   Vital Signs: Temp: 98.7  F (37.1  C) Temp src: Oral BP: 132/72 Pulse: 71   Resp: 18 SpO2: (!) 89 % O2 Device: Nasal cannula Oxygen Delivery: 3 LPM  Weight: 162 lbs 7.66 oz    Gen:  NAD, A&Ox3.  Neck:  Supple.  CV: Fairly regular, no murmurs.  Lung:  CTA b/l today, normal effort.  Ab:  +BS, soft.  Skin:  Warm, dry to touch.  No rash.  Ext:  No pitting edema LE b/l.      Medical Decision Making       45 MINUTES SPENT BY ME on the date of service doing chart review, history, exam, documentation & further activities per the note.      Data     I have personally reviewed the following data over the past 24 hrs:    N/A  \   N/A   / N/A     N/A N/A N/A /  N/A   4.1 N/A N/A \       Imaging results reviewed over the past 24 hrs:   No results found for this or any previous visit (from the past 24 hour(s)).

## 2024-02-10 ENCOUNTER — APPOINTMENT (OUTPATIENT)
Dept: PHYSICAL THERAPY | Facility: CLINIC | Age: 79
DRG: 177 | End: 2024-02-10
Attending: STUDENT IN AN ORGANIZED HEALTH CARE EDUCATION/TRAINING PROGRAM
Payer: COMMERCIAL

## 2024-02-10 VITALS
HEART RATE: 60 BPM | BODY MASS INDEX: 22.93 KG/M2 | OXYGEN SATURATION: 97 % | SYSTOLIC BLOOD PRESSURE: 138 MMHG | WEIGHT: 163.8 LBS | HEIGHT: 71 IN | RESPIRATION RATE: 16 BRPM | TEMPERATURE: 97.6 F | DIASTOLIC BLOOD PRESSURE: 64 MMHG

## 2024-02-10 LAB
ANION GAP SERPL CALCULATED.3IONS-SCNC: 6 MMOL/L (ref 7–15)
BUN SERPL-MCNC: 20.9 MG/DL (ref 8–23)
CALCIUM SERPL-MCNC: 8.6 MG/DL (ref 8.8–10.2)
CHLORIDE SERPL-SCNC: 105 MMOL/L (ref 98–107)
CREAT SERPL-MCNC: 0.91 MG/DL (ref 0.67–1.17)
CRP SERPL-MCNC: 52.97 MG/L
DEPRECATED HCO3 PLAS-SCNC: 31 MMOL/L (ref 22–29)
EGFRCR SERPLBLD CKD-EPI 2021: 86 ML/MIN/1.73M2
ERYTHROCYTE [DISTWIDTH] IN BLOOD BY AUTOMATED COUNT: 13.3 % (ref 10–15)
GLUCOSE SERPL-MCNC: 105 MG/DL (ref 70–99)
HCT VFR BLD AUTO: 35.3 % (ref 40–53)
HGB BLD-MCNC: 11.3 G/DL (ref 13.3–17.7)
MAGNESIUM SERPL-MCNC: 2.1 MG/DL (ref 1.7–2.3)
MCH RBC QN AUTO: 29.9 PG (ref 26.5–33)
MCHC RBC AUTO-ENTMCNC: 32 G/DL (ref 31.5–36.5)
MCV RBC AUTO: 93 FL (ref 78–100)
PLATELET # BLD AUTO: 224 10E3/UL (ref 150–450)
POTASSIUM SERPL-SCNC: 4.1 MMOL/L (ref 3.4–5.3)
RBC # BLD AUTO: 3.78 10E6/UL (ref 4.4–5.9)
SODIUM SERPL-SCNC: 142 MMOL/L (ref 135–145)
UFH PPP CHRO-ACNC: >1.1 IU/ML
WBC # BLD AUTO: 9.2 10E3/UL (ref 4–11)

## 2024-02-10 PROCEDURE — 85027 COMPLETE CBC AUTOMATED: CPT | Performed by: STUDENT IN AN ORGANIZED HEALTH CARE EDUCATION/TRAINING PROGRAM

## 2024-02-10 PROCEDURE — 80048 BASIC METABOLIC PNL TOTAL CA: CPT | Performed by: STUDENT IN AN ORGANIZED HEALTH CARE EDUCATION/TRAINING PROGRAM

## 2024-02-10 PROCEDURE — 250N000013 HC RX MED GY IP 250 OP 250 PS 637: Performed by: STUDENT IN AN ORGANIZED HEALTH CARE EDUCATION/TRAINING PROGRAM

## 2024-02-10 PROCEDURE — 99239 HOSP IP/OBS DSCHRG MGMT >30: CPT | Performed by: INTERNAL MEDICINE

## 2024-02-10 PROCEDURE — 86140 C-REACTIVE PROTEIN: CPT | Performed by: STUDENT IN AN ORGANIZED HEALTH CARE EDUCATION/TRAINING PROGRAM

## 2024-02-10 PROCEDURE — 83735 ASSAY OF MAGNESIUM: CPT | Performed by: STUDENT IN AN ORGANIZED HEALTH CARE EDUCATION/TRAINING PROGRAM

## 2024-02-10 PROCEDURE — 999N000157 HC STATISTIC RCP TIME EA 10 MIN

## 2024-02-10 PROCEDURE — 250N000011 HC RX IP 250 OP 636: Performed by: INTERNAL MEDICINE

## 2024-02-10 PROCEDURE — 97161 PT EVAL LOW COMPLEX 20 MIN: CPT | Mod: GP | Performed by: PHYSICAL THERAPIST

## 2024-02-10 PROCEDURE — 94640 AIRWAY INHALATION TREATMENT: CPT | Mod: 76

## 2024-02-10 PROCEDURE — 250N000013 HC RX MED GY IP 250 OP 250 PS 637: Performed by: INTERNAL MEDICINE

## 2024-02-10 PROCEDURE — 85520 HEPARIN ASSAY: CPT | Performed by: STUDENT IN AN ORGANIZED HEALTH CARE EDUCATION/TRAINING PROGRAM

## 2024-02-10 PROCEDURE — 36415 COLL VENOUS BLD VENIPUNCTURE: CPT | Performed by: STUDENT IN AN ORGANIZED HEALTH CARE EDUCATION/TRAINING PROGRAM

## 2024-02-10 RX ADMIN — AZITHROMYCIN DIHYDRATE 250 MG: 250 TABLET ORAL at 08:47

## 2024-02-10 RX ADMIN — PIPERACILLIN AND TAZOBACTAM 4.5 G: 4; .5 INJECTION, POWDER, FOR SOLUTION INTRAVENOUS at 02:33

## 2024-02-10 RX ADMIN — FLUTICASONE FUROATE AND VILANTEROL TRIFENATATE 1 PUFF: 100; 25 POWDER RESPIRATORY (INHALATION) at 08:48

## 2024-02-10 RX ADMIN — APIXABAN 10 MG: 5 TABLET, FILM COATED ORAL at 08:47

## 2024-02-10 RX ADMIN — FAMOTIDINE 40 MG: 20 TABLET ORAL at 08:46

## 2024-02-10 RX ADMIN — PIPERACILLIN AND TAZOBACTAM 4.5 G: 4; .5 INJECTION, POWDER, FOR SOLUTION INTRAVENOUS at 09:04

## 2024-02-10 RX ADMIN — IPRATROPIUM BROMIDE AND ALBUTEROL 1 PUFF: 20; 100 SPRAY, METERED RESPIRATORY (INHALATION) at 08:50

## 2024-02-10 RX ADMIN — Medication 1 TABLET: at 08:47

## 2024-02-10 RX ADMIN — IPRATROPIUM BROMIDE AND ALBUTEROL 1 PUFF: 20; 100 SPRAY, METERED RESPIRATORY (INHALATION) at 12:43

## 2024-02-10 RX ADMIN — ASPIRIN 81 MG: 81 TABLET, COATED ORAL at 08:47

## 2024-02-10 RX ADMIN — AZELASTINE 1 SPRAY: 1 SPRAY, METERED NASAL at 08:48

## 2024-02-10 ASSESSMENT — ACTIVITIES OF DAILY LIVING (ADL)
ADLS_ACUITY_SCORE: 25
ADLS_ACUITY_SCORE: 23
ADLS_ACUITY_SCORE: 25
ADLS_ACUITY_SCORE: 23
ADLS_ACUITY_SCORE: 25

## 2024-02-10 NOTE — CONSULTS
Care Management Initial Consult    General Information  Assessment completed with: Patient,    Type of CM/SW Visit: Initial Assessment    Primary Care Provider verified and updated as needed: Yes   Readmission within the last 30 days: no previous admission in last 30 days      Reason for Consult: other (see comments) (home care)       Communication Assessment  Patient's communication style: spoken language (English or Bilingual)    Hearing Difficulty or Deaf: no   Wear Glasses or Blind: no    Cognitive  Cognitive/Neuro/Behavioral: WDL                      Living Environment:   People in home: spouse     Current living Arrangements: house            Family/Social Support:  Care provided by: child(nabila)  Provides care for: spouse    Current Resources:   Patient receiving home care services: No     Community Resources: None  Equipment currently used at home: none  Supplies currently used at home: Oxygen Tubing/Supplies        Lifestyle & Psychosocial Needs:  Social Determinants of Health     Food Insecurity: Not on file   Depression: Not on file   Housing Stability: Not on file   Tobacco Use: Medium Risk (8/19/2020)    Patient History     Smoking Tobacco Use: Former     Smokeless Tobacco Use: Never     Passive Exposure: Not on file   Financial Resource Strain: Not on file   Alcohol Use: Not on file   Transportation Needs: Not on file   Physical Activity: Not on file   Interpersonal Safety: Not on file   Stress: Not on file   Social Connections: Not on file       Additional Information:  CM consulted for home care. CM called pt via phone d/t COVID special precautions. 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 He stated she is in the hospital in [place] right now. He shared that his dtr Joaquim is helping w looking for alternative living options for his wife. CM provided retirement and senior linkage line resources. Per report his dtr is planning to stay w him for a few days after  "discharge. He stated that he uses oxygen 2-4L w an inogen unit at home and unit \"can go up to 6L.\" No home PT needs per PT. CM offered home care nursing as an option to pt, he declined. He stated his dtr is planning to pick him up for discharge transport to home. No other discharge needs identified, pt declined any further questions at this time. Please let us know if any discharge needs arise.     Tiffanie Maxwell, RN, BSN  Inpatient Care Coordination  [facility]  [phone]      "

## 2024-02-10 NOTE — DISCHARGE SUMMARY
[facility]  Discharge Summary  Hospitalist      Date of Admission:  2/7/2024  Date of Discharge:  2/10/2024  Provider:  Jair Cheng MD. Replaced by [facility]  Date of Service (when I last saw the patient): 02/10/24      Primary Provider: González Cornelius          Discharge Diagnosis:     Discharge Diagnoses   COVID-19 infection  Possible secondary bacterial pneumonia  Acute on chronic hypoxic and hypercapnic respiratory failure  COPD exacerbation  Hypomagnesemia  Hypocalcemia  Right pulmonary emboli  Mild troponin elevation demand ischemia  New diagnosis of atrial fibrillation    Other medical issues:  Past Medical History:   Diagnosis Date    BPH (benign prostatic hyperplasia)     COPD (chronic obstructive pulmonary disease) (H)     Prostate enlargement          Please see the admission history and physical for full details.     Hospital Course     Mahad Ward was admitted on 2/7/2024.  The following problems were addressed during his hospitalization:     78-year-old male with a history of COPD and chronic hypoxic respiratory failure presented with worsening shortness of breath and was diagnosed with COVID-19 infection, possible secondary bacterial pneumonia, acute on chronic hypoxic and hypercapnic respiratory failure, and a COPD exacerbation. Treatment included supplemental oxygen, azithromycin, ceftriaxone,  dexamethasone, IV remdesivir, DuoNebs, inhaled fluticasone/vilanterol, and additional albuterol as needed. Hypomagnesemia and hypocalcemia were managed with a magnesium replacement protocol.  Incidental finding of right pulmonary emboli believed to be more chronic were treated with IV heparin drip transitioning to a DOAC Eliquis which she will also need for his atrial fibrillation.   Mild troponin elevation and new diagnosis of atrial fibrillation were addressed with telemetry monitoring, DOAC initiation, and aspirin therapy, follow-up with primary care to consider outpatient cardiac stress test  post-discharge once clinically improved.  Heart rate was in the 50s and 60s on a beta-blocker was added.    One more day of monitoring and ongoing therapy in the hospital was recommended, overall his condition improved , the patient insisted on discharge, understanding the need for quarantine for 1 more week.    Pending Results   Unresulted Labs Ordered in the Past 30 Days of this Admission       No orders found from 1/8/2024 to 2/8/2024.            Discharge Orders      Home Care Referral      Reason for your hospital stay    You had shortness of breath from COVID-19 and a blood clot.     Follow-up and recommended labs and tests     Follow up with primary care provider, Gonzálze Cornelius, within 7 days for hospital follow- up.  The following labs/tests are recommended: CBC.     Activity    Your activity upon discharge: activity as tolerated     Oxygen Adult/Peds    Oxygen Documentation  I certify that this patient, Mahad Ward has been under my care (or a nurse practitioner or physican's assistant working with me). This is the face-to-face encounter for oxygen medical necessity.      At the time of this encounter, I have reviewed the qualifying testing and have determined that supplemental oxygen is reasonable and necessary and is expected to improve the patient's condition in a home setting.       Patient has continued oxygen desaturation due to Chronic Respiratory Failure with Hypoxia J96.11.    If portability is ordered, is the patient mobile within the home? yes     Diet    Follow this diet upon discharge: Orders Placed This Encounter      Combination Diet Regular Diet Adult       Code Status   Full Code       Primary Care Physician   González Cornelius    Physical Exam   Temp: 97.6  F (36.4  C) Temp src: Axillary BP: 138/64 Pulse: 61   Resp: 16 SpO2: 97 % O2 Device: Nasal cannula Oxygen Delivery: 1 LPM  Vitals:    02/08/24 0619 02/09/24 0644 02/10/24 0537   Weight: 76.2 kg (167 lb 14.4 oz) 73.7 kg (162 lb 7.7 oz) 74.3  kg (163 lb 12.8 oz)     Vital Signs with Ranges  Temp:  [97.5  F (36.4  C)-98.2  F (36.8  C)] 97.6  F (36.4  C)  Pulse:  [61-71] 61  Resp:  [16-18] 16  BP: (132-148)/(64-75) 138/64  SpO2:  [89 %-98 %] 97 %  I/O last 3 completed shifts:  In: -   Out: 3100 [Urine:3100]    Constitutional:  alert, cooperative, no apparent distress  Respiratory: No increased work of breathing, good air exchange, no crackles or wheezing.  Cardiovascular: apical impulse,normal S1 and S2  GI: bowel sounds present, soft, non-distended, non-tender      Discharge Disposition   Discharged to home    Consultations This Hospital Stay   PHARMACY IP CONSULT  PHARMACY LIAISON FOR MEDICATION COVERAGE CONSULT  PHYSICAL THERAPY ADULT IP CONSULT  CARE MANAGEMENT / SOCIAL WORK IP CONSULT  SOCIAL WORK IP CONSULT    Time Spent on this Encounter   I, Jair Cheng MD, personally saw the patient today and spent greater than 30 minutes discharging this patient.      Discharge Medications   Current Discharge Medication List        START taking these medications    Details   Apixaban Starter Pack (ELIQUIS DVT/PE STARTER PACK) 5 MG TBPK Take 10 mg by mouth 2 times daily for 7 days, THEN 5 mg 2 times daily for 23 days.  Qty: 74 each, Refills: 0    Associated Diagnoses: COVID-19      azithromycin (ZITHROMAX) 250 MG tablet Take 1 tablet (250 mg) by mouth daily for 4 days  Qty: 4 tablet, Refills: 0    Associated Diagnoses: Community acquired pneumonia of right lower lobe of lung      cefuroxime (CEFTIN) 500 MG tablet Take 1 tablet (500 mg) by mouth 2 times daily for 7 days  Qty: 14 tablet, Refills: 0    Associated Diagnoses: Community acquired pneumonia of right lower lobe of lung      dexAMETHasone (DECADRON) 6 MG tablet Take 1 tablet (6 mg) by mouth daily  Qty: 8 tablet, Refills: 0    Associated Diagnoses: COVID-19           CONTINUE these medications which have NOT CHANGED    Details   acetaminophen-caffeine (EXCEDRIN TENSION HEADACHE) 500-65 MG TABS Take  2 tablets by mouth every 6 hours as needed for mild pain      azelastine (ASTELIN) 0.1 % nasal spray Spray 1 spray into both nostrils 2 times daily      Cholecalciferol (VITAMIN D3) 2000 units TABS Take 2,000 Units by mouth every morning      famotidine (PEPCID) 40 MG tablet Take 40 mg by mouth daily      Fluticasone-Salmeterol (AIRDUO RESPICLICK) 113-14 MCG/ACT AEPB inhaler Inhale 1 puff into the lungs 2 times daily Rinse mouth after use.      Ipratropium-Albuterol (COMBIVENT RESPIMAT)  MCG/ACT inhaler Inhale 1 puff into the lungs every 4 hours Per pt, using 4 to 5 times/day      Multiple Vitamins-Minerals (CENTRUM SILVER) per tablet Take 1 tablet by mouth daily      rosuvastatin (CRESTOR) 10 MG tablet Take 10 mg by mouth at bedtime           Allergies   Allergies   Allergen Reactions    Levaquin [Levofloxacin]      Data   Most Recent 3 CBC's:  Recent Labs   Lab Test 02/10/24  0720 02/08/24  0710 02/08/24  0126   WBC 9.2 5.9 7.9   HGB 11.3* 11.0* 10.6*   MCV 93 95 96    189 202      Most Recent 3 BMP's:  Recent Labs   Lab Test 02/10/24  0720 02/09/24  2048 02/09/24  0805 02/08/24  0710     --  142 140   POTASSIUM 4.1 4.2 4.2  4.1 4.2   CHLORIDE 105  --  107 105   CO2 31*  --  24 24   BUN 20.9  --  17.4 13.6   CR 0.91  --  0.82 0.87   ANIONGAP 6*  --  11 11   KATELYNN 8.6*  --  8.5* 8.0*   *  --  126* 151*     Most Recent 2 LFT's:  Recent Labs   Lab Test 02/08/24  0710 02/07/24  1842   AST 30 33   ALT 14 17   ALKPHOS 44 44   BILITOTAL 0.3 0.4     Most Recent INR's and Anticoagulation Dosing History:  Anticoagulation Dose History          Latest Ref Rng & Units 8/2/2018 8/3/2018 8/4/2018   Recent Dosing and Labs   warfarin ANTICOAGULANT (COUMADIN) 5 MG tablet - - 5 mg, $Given -   warfarin ANTICOAGULANT (COUMADIN) 7.5 MG tablet - 7.5 mg, $Given - -   INR 0.86 - 1.14 1.12  1.32  1.78      Most Recent 3 Troponin's:  Recent Labs   Lab Test 07/30/18  2254 07/30/18  1922 07/30/18  1505   TROPI 0.189*  0.213* 0.256*     Most Recent Cholesterol Panel:No lab results found.  Most Recent 6 Bacteria Isolates From Any Culture (See EPIC Reports for Culture Details):  Recent Labs   Lab Test 11/26/18  1000 11/25/18  1453 11/25/18  1446 07/31/18  0930 07/30/18  1539 07/30/18  1505   CULT Moderate growth  Normal rohini   No growth No growth Moderate growth  Normal rohini   No growth No growth     Most Recent TSH, T4 and A1c Labs:  Recent Labs   Lab Test 07/31/18  0613   TSH 0.45     Results for orders placed or performed during the hospital encounter of 02/07/24   XR Chest 2 Views    Narrative    EXAM: XR CHEST 2 VIEWS  LOCATION: [facility]  DATE: 2/7/2024    INDICATION: Shortness of breath.  COMPARISON: Chest radiograph 11/25/2018.      Impression    IMPRESSION:     Emphysematous, hyperinflated lungs, suggestive of COPD.    Small hazy airspace opacity in the lateral right lower lung appears similar to prior and is favored to reflect chronic changes from parenchymal scarring/fibrosis, although a superimposed infection is not excluded.    Remainder of the lungs appear similar, with biapical scarring and scattered strands of fibrosis. No pleural effusions. No pneumothorax. Stable cardiomediastinal silhouette.   CT Chest Pulmonary Embolism w Contrast    Narrative    EXAM: CT CHEST PULMONARY EMBOLISM W CONTRAST  LOCATION: [facility]  DATE: 2/7/2024    INDICATION: shortness of breath, elevated d dimer  COMPARISON: 07/31/2018  TECHNIQUE: CT chest pulmonary angiogram during arterial phase injection of IV contrast. Multiplanar reformats and MIP reconstructions were performed. Dose reduction techniques were used.   CONTRAST: 70 mL Isovue 370    FINDINGS:  ANGIOGRAM CHEST: There is a single chronic appearing segmental pulmonary embolus in the right upper lobe. There is a single chronic appearing segmental embolus within the superior segment right lower lobe. No left sided PE. Pulmonary  arteries normal in   caliber. Thoracic aorta normal in caliber. No aortic dissection.    HEART: Cardiac chambers within normal limits. No pericardial effusion. No coronary artery calcification.    MEDIASTINUM: No adenopathy or mass.    LUNGS AND PLEURA: Moderate emphysema. Severe bronchitis and bronchiolitis, greatest within the basal right lower lobe and inferior aspect of the right middle lobe. Mild subsegmental consolidation within the lateral right lower lobe. Trace bilateral   pleural effusions.    LIMITED UPPER ABDOMEN: Multiple minimally calcified gallstones, largest measuring 2 cm.    MUSCULOSKELETAL: Negative.      Impression    IMPRESSION:  1.  There are 2 small chronic appearing segmental pulmonary emboli within the right lung. No left-sided emboli. No evidence for elevated right heart pressure.  2.  Severe chronic/recurrent bronchitis and bronchiolitis with small area of pneumonia in the right lower lobe laterally.  3.  Moderate emphysema.  4.  Cholelithiasis.   Echocardiogram Complete     Value    LVEF  55-60%    Narrative    [id number]  [id number]  [id number]  [id number]^SUNG^REYNA^PETRA     [facility]  Echocardiography Laboratory  [address] [place] [address]     Name: GREGOR COHEN  MRN: [id number]  : 1945  Study Date: 2024 08:27 AM  Age: 78 yrs  Gender: Male  Patient Location: [facility]  Reason For Study: Atrial Fibrillation  Ordering Physician: REYNA ALMARAZ  Performed By: Luis Martel RDCS     BSA: 1.9 m2  Height: 70 in  Weight: 167 lb  HR: 67  BP: 119/68 mmHg  ______________________________________________________________________________  Procedure  Complete Echo Adult.  ______________________________________________________________________________  Interpretation Summary     Left ventricular systolic function is normal.  The visual ejection fraction is 60-65%.  Left ventricular diastolic function is normal.  No regional wall motion abnormalities.  Normal  right ventricular size and systolic function.  No significant valve disease.  Normal left atrial size.  Dilated inferior vena cava with normal respiratory variation.     ______________________________________________________________________________  Left Ventricle  The left ventricle is normal in size. There is normal left ventricular wall  thickness. Left ventricular systolic function is normal. The visual ejection  fraction is 55-60%. Grade I or early diastolic dysfunction. No regional wall  motion abnormalities noted.     Right Ventricle  The right ventricle is normal in size and function.     Atria  Normal left atrial size. Right atrial size is normal. There is no atrial shunt  seen.     Mitral Valve  The mitral valve leaflets appear normal. There is no evidence of stenosis,  fluttering, or prolapse. There is trace mitral regurgitation. There is no  mitral valve stenosis.     Tricuspid Valve  Normal tricuspid valve. There is trace tricuspid regurgitation. Right  ventricular systolic pressure could not be approximated due to inadequate  tricuspid regurgitation.     Aortic Valve  There is mild trileaflet aortic sclerosis. No aortic regurgitation is present.  No aortic stenosis is present.     Pulmonic Valve  The pulmonic valve is not well seen, but is grossly normal. There is trace  pulmonic valvular regurgitation. Normal pulmonic valve velocity.     Vessels  The aortic root is normal size. Normal size ascending aorta. Dilation of the  inferior vena cava is present with normal respiratory variation in diameter.     Pericardium  There is no pericardial effusion.     Rhythm  Sinus rhythm was noted.  ______________________________________________________________________________  MMode/2D Measurements & Calculations     IVSd: 1.1 cm  LVIDd: 5.3 cm  LVIDs: 4.3 cm  LVPWd: 0.82 cm  IVC diam: 2.6 cm  FS: 19.3 %  LV mass(C)d: 185.7 grams  LV mass(C)dI: 96.0 grams/m2  Ao root diam: 3.9 cm  asc Aorta Diam: 3.2 cm  LVOT diam:  2.2 cm  LVOT area: 4.0 cm2  Ao root diam index Ht(cm/m): 2.2  Ao root diam index BSA (cm/m2): 2.0  Asc Ao diam index BSA (cm/m2): 1.7  Asc Ao diam index Ht(cm/m): 1.8  LA Volume (BP): 47.1 ml     LA Volume Index (BP): 24.4 ml/m2  RWT: 0.31  TAPSE: 2.2 cm     Time Measurements  Aortic HR: 72.0 BPM     Doppler Measurements & Calculations  MV E max warren: 70.7 cm/sec  MV A max warren: 87.0 cm/sec  MV E/A: 0.81  MV max PG: 3.3 mmHg  MV mean P.6 mmHg  MV V2 VTI: 22.0 cm  MVA(VTI): 3.9 cm2  MV dec time: 0.23 sec  LV V1 max PG: 3.5 mmHg  LV V1 max: 93.3 cm/sec  LV V1 VTI: 21.4 cm  CO(LVOT): 6.1 l/min  CI(LVOT): 3.1 l/min/m2  SV(LVOT): 84.6 ml  SI(LVOT): 43.7 ml/m2  PA acc time: 0.12 sec     E/E' av.4  Lateral E/e': 10.0  Medial E/e': 8.8  RV S Warren: 14.3 cm/sec     ______________________________________________________________________________  Report approved by: Dr Parveen Azul 2024 10:01 AM                 Disclaimer: This note consists of symbols derived from keyboarding, dictation and/or voice recognition software. As a result, there may be errors in the script that have gone undetected. Please consider this when interpreting information found in this chart.

## 2024-02-10 NOTE — PROGRESS NOTES
02/10/24 1145   Appointment Info   Signing Clinician's Name / Credentials (PT) Mahad Angulo DPT   Rehab Comments (PT) 3L 02   Living Environment   Living Environment Comments Pt lives in manufactured home with spouse with dementia. Pt reports needing to provide increased physical assistance recently, have been talking about moving her to facility. Spouse currently hospitalized as well. Dgt will be staying with patient for a few days at home.   Self-Care   Usual Activity Tolerance moderate   Current Activity Tolerance moderate   Equipment Currently Used at Home none   Fall history within last six months no   General Information   Onset of Illness/Injury or Date of Surgery 02/07/24   Referring Physician Jean-Claude Simpson MD   Patient/Family Therapy Goals Statement (PT) To go home   Pertinent History of Current Problem (include personal factors and/or comorbidities that impact the POC) Per H&P, pt is a 78 year old male admitted on 2/7/2024. He has a past medical history significant for COPD, chronic hypoxic respiratory failure.  Presented to emergency room with worsening shortness of breath.  Found to have COVID-19 infection and possible secondary bacterial pneumonia.   Existing Precautions/Restrictions oxygen therapy device and L/min  (3L)   Cognition   Affect/Mental Status (Cognition) WNL   Orientation Status (Cognition) oriented x 4   Follows Commands (Cognition) WFL   Pain Assessment   Patient Currently in Pain No   Range of Motion (ROM)   ROM Comment B LE WFL   Strength (Manual Muscle Testing)   Strength (Manual Muscle Testing) strength is WFL   Bed Mobility   Comment, (Bed Mobility) ind supine <> sit   Transfers   Comment, (Transfers) ind sit <> stand w/o AD   Gait/Stairs (Locomotion)   Distance in Feet (Gait) 120   Pattern (Gait) step-through   Deviations/Abnormal Patterns (Gait) base of support, wide;gait speed decreased   Comment, (Gait/Stairs) ind w/o AD   Balance   Balance Comments normal sitting; good  standing w/o AD   Clinical Impression   Criteria for Skilled Therapeutic Intervention Evaluation only   Clinical Presentation (PT Evaluation Complexity) stable   Clinical Presentation Rationale Pt functional status   Clinical Decision Making (Complexity) low complexity   Risk & Benefits of therapy have been explained evaluation/treatment results reviewed;risks/benefits reviewed;current/potential barriers reviewed;participants voiced agreement with care plan;participants included;patient   PT Total Evaluation Time   PT Eval, Low Complexity Minutes ([id number]) 16   PT Discharge Planning   PT Discharge Recommendation (DC Rec) home   PT Rationale for DC Rec Pt likely at baseline mobility, ind with mobility and use of supplemental 02 as needed with activity, typically 2-4L. Pt currently using 3L, with SP02 >94% with activity. Dgt will be present to assist patient at discharge. Pt largest concern is with his wife and what to do as he is not able to care for her, looking for [facility] placement.   PT Brief overview of current status ind w/o AD   Total Session Time   Total Session Time (sum of timed and untimed services) 16

## 2024-02-10 NOTE — PLAN OF CARE
Goal Outcome Evaluation:      Plan of Care Reviewed With: patient    Overall Patient Progress: improvingOverall Patient Progress: improving    Pt A & O x 4, VSS, pt voiding in urinal at bedside. Heparin gtt stopped ( See MAR), pt started on Eliquis. K+ and Mag protocol, recheck this AM. Treatment plan consists IV zosyn and remdesivir, PO azithromycin and dexamethasone. Pt tolerating regular diet. Pt did have 7 beat run of V-tach, pt states he felt fine, provider was notified and labs ordered.  Heparin XA critical value, provider notified no new orders.  Pt denies pain, does have a frequent productive cough, currently on 3 LPM of O2 via NC. Pt to have home O2 evaluation prior to discharge  Pt expresses desire to discharge today.

## 2024-02-10 NOTE — PROVIDER NOTIFICATION
Arrived to the [facility] ambulatory. DC CI 5fu pump completed. Patient tolerated well. Any issues or concerns during appointment: no.  Patient aware of next infusion appointment on 6/10 @ 0900  Patient instructed to call provider with temperature of 100.4 or greater or nausea/vomiting/ diarrhea or pain not controlled by medications  Discharged to home ambulatory. Providers was notified of a 7 beat run of V-tach. Labs ordered. Pt denies any symptoms, states he feels fine at this time.

## 2024-02-10 NOTE — PLAN OF CARE
A&Ox4, SBA, voiding in urinal. VSS on 3L NC. Tele SR with PVCs. Tolerating regular diet, good appetite. Heparin gtt running at 1150u/hr. K+, Mg protocol, no replacement necessary today, recheck scheduled for tomorrow AM. Frequent productive cough, LS coarse. Denies pain. Treatment plan includes PO azithromycin and dexamethasone, IV zosyn and remdesivir. Heparin to be stopped prior to first dose of eliquis this evening.

## 2024-02-10 NOTE — PLAN OF CARE
Discharge instructions given, iv removed, waiting for daughter to arrive for transport home. Filled meds given for home use.

## 2024-02-13 ENCOUNTER — PATIENT OUTREACH (OUTPATIENT)
Dept: CARE COORDINATION | Facility: CLINIC | Age: 79
End: 2024-02-13
Payer: COMMERCIAL

## 2024-02-14 ENCOUNTER — PATIENT OUTREACH (OUTPATIENT)
Dept: CARE COORDINATION | Facility: CLINIC | Age: 79
End: 2024-02-14
Payer: COMMERCIAL

## 2024-02-14 NOTE — PROGRESS NOTES
Connected Care Resource Center Contact  [facility]/Voicemail     Clinical Data: Post-Discharge Outreach     Outreach attempted x 2.  Left message on patient's voicemail, providing [facility]'s central phone number of [phone] ([phone]) for questions/concerns and/or to schedule an appt with an [facility] provider, if they do not have a PCP.      Plan:  [facility] will do no further outreaches at this time.       FIDENCIO Ni  Connected Care Resource Center, [facility]    *Connected Care Resource Team does NOT follow patient ongoing. Referrals are identified based on internal discharge reports and the outreach is to ensure patient has an understanding of their discharge instructions.